# Patient Record
Sex: FEMALE | Race: OTHER | Employment: UNEMPLOYED | URBAN - METROPOLITAN AREA
[De-identification: names, ages, dates, MRNs, and addresses within clinical notes are randomized per-mention and may not be internally consistent; named-entity substitution may affect disease eponyms.]

---

## 2017-03-29 ENCOUNTER — NURSE ONLY (OUTPATIENT)
Dept: FAMILY MEDICINE CLINIC | Facility: CLINIC | Age: 32
End: 2017-03-29

## 2017-03-29 DIAGNOSIS — IMO0001 UNCONTROLLED TYPE 2 DIABETES MELLITUS WITHOUT COMPLICATION, WITHOUT LONG-TERM CURRENT USE OF INSULIN: ICD-10-CM

## 2017-03-29 LAB
ALBUMIN SERPL BCP-MCNC: 3.7 G/DL (ref 3.5–4.8)
ALBUMIN/GLOB SERPL: 1.2 {RATIO} (ref 1–2)
ALP SERPL-CCNC: 76 U/L (ref 32–100)
ALT SERPL-CCNC: 26 U/L (ref 14–54)
ANION GAP SERPL CALC-SCNC: 9 MMOL/L (ref 0–18)
AST SERPL-CCNC: 22 U/L (ref 15–41)
BILIRUB SERPL-MCNC: 0.4 MG/DL (ref 0.3–1.2)
BUN SERPL-MCNC: 12 MG/DL (ref 8–20)
BUN/CREAT SERPL: 16 (ref 10–20)
CALCIUM SERPL-MCNC: 9.2 MG/DL (ref 8.5–10.5)
CHLORIDE SERPL-SCNC: 100 MMOL/L (ref 95–110)
CO2 SERPL-SCNC: 28 MMOL/L (ref 22–32)
CREAT SERPL-MCNC: 0.75 MG/DL (ref 0.5–1.5)
GLOBULIN PLAS-MCNC: 3.1 G/DL (ref 2.5–3.7)
GLUCOSE SERPL-MCNC: 149 MG/DL (ref 70–99)
OSMOLALITY UR CALC.SUM OF ELEC: 287 MOSM/KG (ref 275–295)
POTASSIUM SERPL-SCNC: 5 MMOL/L (ref 3.3–5.1)
PROT SERPL-MCNC: 6.8 G/DL (ref 5.9–8.4)
SODIUM SERPL-SCNC: 137 MMOL/L (ref 136–144)

## 2017-03-29 PROCEDURE — 36415 COLL VENOUS BLD VENIPUNCTURE: CPT

## 2017-03-29 PROCEDURE — 80053 COMPREHEN METABOLIC PANEL: CPT

## 2017-03-29 PROCEDURE — 83036 HEMOGLOBIN GLYCOSYLATED A1C: CPT

## 2017-03-30 LAB — HBA1C MFR BLD: 7.8 % (ref 4–6)

## 2017-04-05 ENCOUNTER — OFFICE VISIT (OUTPATIENT)
Dept: FAMILY MEDICINE CLINIC | Facility: CLINIC | Age: 32
End: 2017-04-05

## 2017-04-05 VITALS
WEIGHT: 153 LBS | OXYGEN SATURATION: 98 % | BODY MASS INDEX: 27 KG/M2 | SYSTOLIC BLOOD PRESSURE: 100 MMHG | HEART RATE: 90 BPM | DIASTOLIC BLOOD PRESSURE: 72 MMHG

## 2017-04-05 DIAGNOSIS — N39.3 FEMALE STRESS INCONTINENCE: ICD-10-CM

## 2017-04-05 DIAGNOSIS — IMO0001 UNCONTROLLED TYPE 2 DIABETES MELLITUS WITHOUT COMPLICATION, WITHOUT LONG-TERM CURRENT USE OF INSULIN: Primary | ICD-10-CM

## 2017-04-05 DIAGNOSIS — E66.3 OVERWEIGHT(278.02): ICD-10-CM

## 2017-04-05 PROCEDURE — 99213 OFFICE O/P EST LOW 20 MIN: CPT

## 2017-04-05 RX ORDER — PHENTERMINE HYDROCHLORIDE 37.5 MG/1
37.5 TABLET ORAL
Qty: 30 TABLET | Refills: 0 | Status: SHIPPED | OUTPATIENT
Start: 2017-04-05 | End: 2017-06-23

## 2017-04-05 RX ORDER — OXYBUTYNIN CHLORIDE 10 MG/1
10 TABLET, EXTENDED RELEASE ORAL DAILY
Qty: 30 TABLET | Refills: 0 | Status: SHIPPED | OUTPATIENT
Start: 2017-04-05 | End: 2018-03-30

## 2017-04-06 NOTE — PATIENT INSTRUCTIONS
Treating Incontinence in Women: Non-surgical Methods    The best treatment for you will depend on the type of incontinence you have. Your symptoms, age, and any underlying problems that are found also affect your treatment.  While some types of incontinen © 4364-7156 [address], [address] [place]. All rights reserved. This information is not intended as a substitute for professional medical care. Always follow your healthcare professional's instructions.

## 2017-04-06 NOTE — PROGRESS NOTES
HPI:    Patient ID: Josafat Ibrahim is a 28year old female. Diabetes  She presents for her follow-up diabetic visit. She has type 2 diabetes mellitus. Onset time: few years. Her disease course has been improving.  Pertinent negatives for hypoglycemia i decreased urine volume. Musculoskeletal: Negative for neck pain. Skin: Negative for rash. Neurological: Negative for dizziness, vertigo, syncope, weakness, light-headedness, numbness and headaches. All other systems reviewed and are negative. daily.    3. Overweight  4. BMI 27.0-27.9,adult  Pt counseled with regards to diet/exercise. Refill for Phentermine given. RTC in 1 month for f/u of weight management and in 3 months for f/u of DM.         Orders Placed This Encounter  Comp Metabolic Pa

## 2017-06-21 ENCOUNTER — NURSE ONLY (OUTPATIENT)
Dept: FAMILY MEDICINE CLINIC | Facility: CLINIC | Age: 32
End: 2017-06-21

## 2017-06-21 DIAGNOSIS — IMO0001 UNCONTROLLED TYPE 2 DIABETES MELLITUS WITHOUT COMPLICATION, WITHOUT LONG-TERM CURRENT USE OF INSULIN: ICD-10-CM

## 2017-06-21 PROCEDURE — 36415 COLL VENOUS BLD VENIPUNCTURE: CPT

## 2017-06-21 PROCEDURE — 85018 HEMOGLOBIN: CPT

## 2017-06-21 PROCEDURE — 83036 HEMOGLOBIN GLYCOSYLATED A1C: CPT

## 2017-06-21 PROCEDURE — 80053 COMPREHEN METABOLIC PANEL: CPT

## 2017-06-21 PROCEDURE — 82962 GLUCOSE BLOOD TEST: CPT

## 2017-06-23 ENCOUNTER — OFFICE VISIT (OUTPATIENT)
Dept: FAMILY MEDICINE CLINIC | Facility: CLINIC | Age: 32
End: 2017-06-23

## 2017-06-23 VITALS
BODY MASS INDEX: 27.33 KG/M2 | HEART RATE: 88 BPM | HEIGHT: 62 IN | SYSTOLIC BLOOD PRESSURE: 104 MMHG | DIASTOLIC BLOOD PRESSURE: 70 MMHG | OXYGEN SATURATION: 98 % | WEIGHT: 148.5 LBS

## 2017-06-23 DIAGNOSIS — Z13.31 DEPRESSION SCREENING: ICD-10-CM

## 2017-06-23 DIAGNOSIS — E11.9 CONTROLLED TYPE 2 DIABETES MELLITUS WITHOUT COMPLICATION, WITHOUT LONG-TERM CURRENT USE OF INSULIN (HCC): Primary | ICD-10-CM

## 2017-06-23 DIAGNOSIS — E66.3 OVERWEIGHT: ICD-10-CM

## 2017-06-23 PROCEDURE — 99213 OFFICE O/P EST LOW 20 MIN: CPT

## 2017-06-23 RX ORDER — PHENTERMINE HYDROCHLORIDE 37.5 MG/1
37.5 TABLET ORAL
Qty: 30 TABLET | Refills: 0 | Status: SHIPPED | OUTPATIENT
Start: 2017-06-23 | End: 2017-10-04

## 2017-06-23 NOTE — PATIENT INSTRUCTIONS
Diabetes: Inspecting Your Feet    Diabetes increases your chances of developing foot problems. So inspect your feet every day. This helps you find small skin irritations before they become serious ulcers or infections.  If you have trouble seeing the jarred · Cracks and sores are caused by dry or irritated skin. They are a sign that the skin is breaking down, which can lead to infection. · Toenail problems to watch for include nails growing into the skin (ingrown toenail) and causing redness or pain.  Thick,

## 2017-06-23 NOTE — PROGRESS NOTES
HPI:    Patient ID: Patricia Cueva is a 28year old female. Diabetes  She presents for her follow-up diabetic visit. She has type 2 diabetes mellitus. Onset time: few years. Her disease course has been improving.  Pertinent negatives for hypoglycemia i Endocrine: Negative for polydipsia, polyphagia and polyuria. Genitourinary: Negative for dysuria, hematuria and decreased urine volume. Musculoskeletal: Negative for myalgias and neck pain. Skin: Negative for rash.    Neurological: Negative for dizz insulin ([address]  Pt reassured and all questions answered. Lab results reviewed and discussed with pt. Latest Hgb A1c controlled @ 6.9. Refill for Metformin given.   Will refer to Dr. Herbert Smith (Ophthalmology) for diabetic eye exam.  Diabetic foot exam perfo

## 2017-09-28 ENCOUNTER — TELEPHONE (OUTPATIENT)
Dept: FAMILY MEDICINE CLINIC | Facility: CLINIC | Age: 32
End: 2017-09-28

## 2017-09-28 ENCOUNTER — APPOINTMENT (OUTPATIENT)
Dept: LAB | Facility: HOSPITAL | Age: 32
End: 2017-09-28
Payer: MEDICAID

## 2017-09-28 DIAGNOSIS — E11.9 TYPE 2 DIABETES MELLITUS WITHOUT COMPLICATION, WITHOUT LONG-TERM CURRENT USE OF INSULIN (HCC): Primary | ICD-10-CM

## 2017-09-28 DIAGNOSIS — E11.9 TYPE 2 DIABETES MELLITUS WITHOUT COMPLICATION, WITHOUT LONG-TERM CURRENT USE OF INSULIN (HCC): ICD-10-CM

## 2017-09-28 PROCEDURE — 80053 COMPREHEN METABOLIC PANEL: CPT

## 2017-09-28 PROCEDURE — 36415 COLL VENOUS BLD VENIPUNCTURE: CPT

## 2017-09-28 PROCEDURE — 83036 HEMOGLOBIN GLYCOSYLATED A1C: CPT

## 2017-09-28 NOTE — TELEPHONE ENCOUNTER
Patient is looking for orders to get 3 months f/u on her diabetes. Ok per Dr Leonarda Prater to place orders in the system.

## 2017-10-04 ENCOUNTER — OFFICE VISIT (OUTPATIENT)
Dept: FAMILY MEDICINE CLINIC | Facility: CLINIC | Age: 32
End: 2017-10-04

## 2017-10-04 VITALS
HEART RATE: 62 BPM | DIASTOLIC BLOOD PRESSURE: 76 MMHG | OXYGEN SATURATION: 98 % | BODY MASS INDEX: 27 KG/M2 | SYSTOLIC BLOOD PRESSURE: 98 MMHG | WEIGHT: 150 LBS

## 2017-10-04 DIAGNOSIS — Z28.21 IMMUNIZATION NOT CARRIED OUT BECAUSE OF PATIENT REFUSAL: ICD-10-CM

## 2017-10-04 DIAGNOSIS — E66.3 OVERWEIGHT: ICD-10-CM

## 2017-10-04 DIAGNOSIS — E11.9 CONTROLLED TYPE 2 DIABETES MELLITUS WITHOUT COMPLICATION, WITHOUT LONG-TERM CURRENT USE OF INSULIN (HCC): Primary | ICD-10-CM

## 2017-10-04 PROCEDURE — 99213 OFFICE O/P EST LOW 20 MIN: CPT

## 2017-10-04 RX ORDER — PHENTERMINE HYDROCHLORIDE 37.5 MG/1
37.5 TABLET ORAL
Qty: 30 TABLET | Refills: 0 | Status: SHIPPED | OUTPATIENT
Start: 2017-10-04 | End: 2017-11-01

## 2017-10-05 PROBLEM — Z28.21 IMMUNIZATION NOT CARRIED OUT BECAUSE OF PATIENT REFUSAL: Status: ACTIVE | Noted: 2017-10-05

## 2017-10-05 NOTE — PROGRESS NOTES
HPI:    Patient ID: Joshua Allen is a 28year old female. Diabetes   She presents for her follow-up diabetic visit. She has type 2 diabetes mellitus. Onset time: few years. Her disease course has been improving.  Pertinent negatives for hypoglycemia abdominal pain, anorexia, change in bowel habit, constipation, diarrhea, nausea and vomiting. Endocrine: Negative for polydipsia, polyphagia and polyuria. Genitourinary: Negative for decreased urine volume, dysuria and hematuria.    Musculoskeletal: Neg Dr. Jwuan Kwong (Ophthalmology) for diabetic eye exam.  Will obtain CMP and Hgb A1c prior to next scheduled appointment. 2. Overweight  3. BMI 27.0-27.9,adult  Pt counseled with regards to diet/exercise. Refill for Phentermine given.     4. Immunization no

## 2017-11-01 ENCOUNTER — OFFICE VISIT (OUTPATIENT)
Dept: FAMILY MEDICINE CLINIC | Facility: CLINIC | Age: 32
End: 2017-11-01

## 2017-11-01 VITALS
DIASTOLIC BLOOD PRESSURE: 76 MMHG | HEART RATE: 61 BPM | BODY MASS INDEX: 27 KG/M2 | SYSTOLIC BLOOD PRESSURE: 100 MMHG | WEIGHT: 149 LBS | OXYGEN SATURATION: 99 %

## 2017-11-01 DIAGNOSIS — E11.9 CONTROLLED TYPE 2 DIABETES MELLITUS WITHOUT COMPLICATION, WITHOUT LONG-TERM CURRENT USE OF INSULIN (HCC): ICD-10-CM

## 2017-11-01 DIAGNOSIS — Z28.21 IMMUNIZATION NOT CARRIED OUT BECAUSE OF PATIENT REFUSAL: ICD-10-CM

## 2017-11-01 DIAGNOSIS — Z30.09 ENCOUNTER FOR GENERAL COUNSELING AND ADVICE ON CONTRACEPTIVE MANAGEMENT: Primary | ICD-10-CM

## 2017-11-01 DIAGNOSIS — Z30.011 ENCOUNTER FOR INITIAL PRESCRIPTION OF CONTRACEPTIVE PILLS: ICD-10-CM

## 2017-11-01 DIAGNOSIS — E66.3 OVERWEIGHT: ICD-10-CM

## 2017-11-01 DIAGNOSIS — Z01.89 ENCOUNTER FOR ROUTINE LABORATORY TESTING: ICD-10-CM

## 2017-11-01 PROCEDURE — 99214 OFFICE O/P EST MOD 30 MIN: CPT

## 2017-11-01 RX ORDER — NORETHINDRONE ACETATE AND ETHINYL ESTRADIOL 1.5-30(21)
1 KIT ORAL DAILY
Qty: 1 PACKAGE | Refills: 2 | Status: SHIPPED | OUTPATIENT
Start: 2017-11-01 | End: 2018-01-31

## 2017-11-01 RX ORDER — PHENTERMINE HYDROCHLORIDE 37.5 MG/1
37.5 TABLET ORAL
Qty: 30 TABLET | Refills: 0 | Status: SHIPPED | OUTPATIENT
Start: 2017-11-01 | End: 2018-01-31

## 2017-11-01 RX ORDER — NAPROXEN 500 MG/1
500 TABLET ORAL 2 TIMES DAILY WITH MEALS
Qty: 60 TABLET | Refills: 1 | Status: SHIPPED | OUTPATIENT
Start: 2017-11-01 | End: 2018-03-30 | Stop reason: ALTCHOICE

## 2017-11-02 NOTE — PATIENT INSTRUCTIONS
Birth Control Choices  Birth control keeps you from getting pregnant during sex. There are many types of birth control. Some are more effective than others. New types are being tested all the time.  Your healthcare provider can help you decide which type · Female sterilization is surgery to block or cut the woman's fallopian tubes. It can be done by placing an instrument into the uterus (hysteroscopy) to insert small coils into the fallopian tubes (Essure).  It can also be done through the belly (laparoscop

## 2017-11-02 NOTE — PROGRESS NOTES
HPI:    Patient ID: Chantel Solomon is a 28year old female. Obesity   This is a chronic problem. Episode onset: few years ago. The problem occurs constantly. The problem has been waxing and waning.  Pertinent negatives include no abdominal pain, anorex volume, dysuria and hematuria. Musculoskeletal: Negative for arthralgias, joint swelling, myalgias and neck pain. Skin: Negative for rash. Neurological: Negative for dizziness, vertigo, syncope, weakness, light-headedness, numbness and headaches.    A pt, and following this discussion pt decided to try OCPs. Rx for Loestrin Fe given. 3. Overweight  4. BMI 27.0-27.9,adult  Pt counseled with regards to diet/exercise. New Rx for Phentermine given.     5. Controlled type 2 diabetes mellitus without comp

## 2017-12-14 ENCOUNTER — TELEPHONE (OUTPATIENT)
Dept: FAMILY MEDICINE CLINIC | Facility: CLINIC | Age: 32
End: 2017-12-14

## 2017-12-14 NOTE — TELEPHONE ENCOUNTER
Patient is calling to get a refill on her birth control. She has an appointment for her physical on Jan 24th.

## 2017-12-14 NOTE — TELEPHONE ENCOUNTER
Patient still has available refills on her birth control - verified with pharmacy as well. Called patient and notified her of this. Verbalized understanding.

## 2018-01-31 ENCOUNTER — OFFICE VISIT (OUTPATIENT)
Dept: FAMILY MEDICINE CLINIC | Facility: CLINIC | Age: 33
End: 2018-01-31

## 2018-01-31 VITALS
DIASTOLIC BLOOD PRESSURE: 70 MMHG | HEART RATE: 86 BPM | BODY MASS INDEX: 27 KG/M2 | SYSTOLIC BLOOD PRESSURE: 90 MMHG | OXYGEN SATURATION: 98 % | WEIGHT: 150 LBS

## 2018-01-31 DIAGNOSIS — Z30.011 ENCOUNTER FOR INITIAL PRESCRIPTION OF CONTRACEPTIVE PILLS: ICD-10-CM

## 2018-01-31 DIAGNOSIS — E66.3 OVERWEIGHT: Primary | ICD-10-CM

## 2018-01-31 PROCEDURE — 99213 OFFICE O/P EST LOW 20 MIN: CPT

## 2018-01-31 RX ORDER — PHENTERMINE HYDROCHLORIDE 37.5 MG/1
37.5 TABLET ORAL
Qty: 30 TABLET | Refills: 0 | Status: SHIPPED | OUTPATIENT
Start: 2018-01-31 | End: 2018-03-30

## 2018-01-31 RX ORDER — NORETHINDRONE ACETATE AND ETHINYL ESTRADIOL 1.5-30(21)
1 KIT ORAL DAILY
Qty: 1 PACKAGE | Refills: 0 | Status: SHIPPED | OUTPATIENT
Start: 2018-01-31 | End: 2018-03-30

## 2018-02-01 NOTE — PATIENT INSTRUCTIONS
Birth Control: The Pill    Birth control pills contain hormones that help prevent pregnancy. The pills are prescribed by your healthcare provider. There are many types of birth control pills available.  If you have side effects from one type of pill, tell In these cases, discuss the risks with your healthcare provider. Date Last Reviewed: 3/1/2017  © 7659-2664 [employer] [address]. [address] [facility], [address] [place] [address]. All rights reserved.  This information is not intended as a substitute for prof

## 2018-02-01 NOTE — PROGRESS NOTES
HPI:    Patient ID: Charmaine Bruner is a 28year old female. Obesity   This is a chronic problem. Episode onset: few years ago. The problem occurs daily. The problem has been waxing and waning.  Pertinent negatives include no abdominal pain, anorexia, a 2 (two) times daily with meals. Disp: 60 tablet Rfl: 1   MetFORMIN HCl 500 MG Oral Tab Take 1 tablet (500 mg total) by mouth 2 (two) times daily with meals.  Disp: 60 tablet Rfl: 2   Oxybutynin Chloride ER 10 MG Oral Tablet 24 Hr Take 1 tablet (10 mg total)

## 2018-02-16 ENCOUNTER — TELEPHONE (OUTPATIENT)
Dept: FAMILY MEDICINE CLINIC | Facility: CLINIC | Age: 33
End: 2018-02-16

## 2018-02-16 RX ORDER — NORETHINDRONE ACETATE AND ETHINYL ESTRADIOL 1; .02 MG/1; MG/1
1 TABLET ORAL DAILY
Qty: 28 TABLET | Refills: 0 | Status: SHIPPED | OUTPATIENT
Start: 2018-02-16 | End: 2018-03-30

## 2018-02-16 NOTE — TELEPHONE ENCOUNTER
Pt called states Dr Elaina Sanchez prescribed new birth control and states that her period has lasted for about 9 days now.  Pt would like to speak to the nurse

## 2018-03-07 ENCOUNTER — TELEPHONE (OUTPATIENT)
Dept: FAMILY MEDICINE CLINIC | Facility: CLINIC | Age: 33
End: 2018-03-07

## 2018-03-07 NOTE — TELEPHONE ENCOUNTER
Called and spoke to pt to remind pt she is due for diabetic eye exam. Pt stated she would call the eye specialist to make an appt.

## 2018-03-11 ENCOUNTER — APPOINTMENT (OUTPATIENT)
Dept: GENERAL RADIOLOGY | Facility: HOSPITAL | Age: 33
End: 2018-03-11
Attending: EMERGENCY MEDICINE
Payer: MEDICAID

## 2018-03-11 ENCOUNTER — HOSPITAL ENCOUNTER (EMERGENCY)
Facility: HOSPITAL | Age: 33
Discharge: HOME | End: 2018-03-11
Attending: EMERGENCY MEDICINE
Payer: MEDICAID

## 2018-03-11 VITALS
RESPIRATION RATE: 18 BRPM | WEIGHT: 150 LBS | DIASTOLIC BLOOD PRESSURE: 72 MMHG | HEART RATE: 75 BPM | OXYGEN SATURATION: 100 % | BODY MASS INDEX: 26.58 KG/M2 | SYSTOLIC BLOOD PRESSURE: 122 MMHG | TEMPERATURE: 98 F | HEIGHT: 63 IN

## 2018-03-11 DIAGNOSIS — R10.9 ACUTE LEFT FLANK PAIN: Primary | ICD-10-CM

## 2018-03-11 LAB
ANION GAP SERPL CALC-SCNC: 9 MMOL/L (ref 0–18)
B-HCG UR QL: NEGATIVE
BASOPHILS # BLD: 0 K/UL (ref 0–0.2)
BASOPHILS NFR BLD: 0 %
BILIRUB UR QL: NEGATIVE
BUN SERPL-MCNC: 11 MG/DL (ref 8–20)
BUN/CREAT SERPL: 19.3 (ref 10–20)
CALCIUM SERPL-MCNC: 9.3 MG/DL (ref 8.5–10.5)
CHLORIDE SERPL-SCNC: 98 MMOL/L (ref 95–110)
CLARITY UR: CLEAR
CO2 SERPL-SCNC: 28 MMOL/L (ref 22–32)
COLOR UR: YELLOW
CREAT SERPL-MCNC: 0.57 MG/DL (ref 0.5–1.5)
D DIMER PPP FEU-MCNC: <0.27 MCG/ML (ref ?–0.5)
EOSINOPHIL # BLD: 0.1 K/UL (ref 0–0.7)
EOSINOPHIL NFR BLD: 1 %
ERYTHROCYTE [DISTWIDTH] IN BLOOD BY AUTOMATED COUNT: 14.2 % (ref 11–15)
GLUCOSE SERPL-MCNC: 111 MG/DL (ref 70–99)
GLUCOSE UR-MCNC: NEGATIVE MG/DL
HCT VFR BLD AUTO: 39.4 % (ref 35–48)
HGB BLD-MCNC: 13.1 G/DL (ref 12–16)
HGB UR QL STRIP.AUTO: NEGATIVE
KETONES UR-MCNC: NEGATIVE MG/DL
LEUKOCYTE ESTERASE UR QL STRIP.AUTO: NEGATIVE
LYMPHOCYTES # BLD: 3.4 K/UL (ref 1–4)
LYMPHOCYTES NFR BLD: 27 %
MCH RBC QN AUTO: 27 PG (ref 27–32)
MCHC RBC AUTO-ENTMCNC: 33.1 G/DL (ref 32–37)
MCV RBC AUTO: 81.5 FL (ref 80–100)
MONOCYTES # BLD: 0.9 K/UL (ref 0–1)
MONOCYTES NFR BLD: 7 %
NEUTROPHILS # BLD AUTO: 8 K/UL (ref 1.8–7.7)
NEUTROPHILS NFR BLD: 65 %
NITRITE UR QL STRIP.AUTO: NEGATIVE
OSMOLALITY UR CALC.SUM OF ELEC: 280 MOSM/KG (ref 275–295)
PH UR: 7 [PH] (ref 5–8)
PLATELET # BLD AUTO: 308 K/UL (ref 140–400)
PMV BLD AUTO: 8.7 FL (ref 7.4–10.3)
POTASSIUM SERPL-SCNC: 3.5 MMOL/L (ref 3.3–5.1)
PROT UR-MCNC: NEGATIVE MG/DL
RBC # BLD AUTO: 4.84 M/UL (ref 3.7–5.4)
SODIUM SERPL-SCNC: 135 MMOL/L (ref 136–144)
SP GR UR STRIP: 1.01 (ref 1–1.03)
UROBILINOGEN UR STRIP-ACNC: <2
VIT C UR-MCNC: NEGATIVE MG/DL
WBC # BLD AUTO: 12.4 K/UL (ref 4–11)

## 2018-03-11 PROCEDURE — 85379 FIBRIN DEGRADATION QUANT: CPT | Performed by: EMERGENCY MEDICINE

## 2018-03-11 PROCEDURE — 71046 X-RAY EXAM CHEST 2 VIEWS: CPT | Performed by: EMERGENCY MEDICINE

## 2018-03-11 PROCEDURE — 85025 COMPLETE CBC W/AUTO DIFF WBC: CPT | Performed by: EMERGENCY MEDICINE

## 2018-03-11 PROCEDURE — 96374 THER/PROPH/DIAG INJ IV PUSH: CPT

## 2018-03-11 PROCEDURE — 81003 URINALYSIS AUTO W/O SCOPE: CPT | Performed by: EMERGENCY MEDICINE

## 2018-03-11 PROCEDURE — 99284 EMERGENCY DEPT VISIT MOD MDM: CPT

## 2018-03-11 PROCEDURE — 80048 BASIC METABOLIC PNL TOTAL CA: CPT | Performed by: EMERGENCY MEDICINE

## 2018-03-11 PROCEDURE — 81025 URINE PREGNANCY TEST: CPT

## 2018-03-11 RX ORDER — CYCLOBENZAPRINE HCL 5 MG
5 TABLET ORAL 3 TIMES DAILY PRN
Qty: 15 TABLET | Refills: 0 | Status: SHIPPED | OUTPATIENT
Start: 2018-03-11 | End: 2018-03-30

## 2018-03-11 RX ORDER — KETOROLAC TROMETHAMINE 30 MG/ML
30 INJECTION, SOLUTION INTRAMUSCULAR; INTRAVENOUS ONCE
Status: COMPLETED | OUTPATIENT
Start: 2018-03-11 | End: 2018-03-11

## 2018-03-11 NOTE — ED PROVIDER NOTES
Patient Seen in: [facility] AND [facility] Emergency Department    History   Patient presents with:  Abdomen/Flank Pain (GI/)    Stated Complaint: left flank pain    HPI    Patient presents to the emergency department with complaint of pain to the left flank a Systems  Constitutional: no fever,  normal appetite, normal energy  HEENT: No cough, no congestion  Cardiovascular: no chest pain pain is on the left flank going into the lower part of the chest  Respiratory: no shortness of breath  Gastrointestinal: no ab comfortable however when she moves she was still having the pain we discussed pain medication as well as muscle relaxer she has an appointment coming up with her doctor in 3 days I discussed muscle relaxer side effects it [birth year] she is going to go Medication List    START taking these medications    Cyclobenzaprine HCl 5 MG Oral Tab  Take 1 tablet (5 mg total) by mouth 3 (three) times daily as needed for Muscle spasms.   Qty: 15 tablet Refills: 0

## 2018-03-14 ENCOUNTER — APPOINTMENT (OUTPATIENT)
Dept: LAB | Facility: HOSPITAL | Age: 33
End: 2018-03-14
Payer: MEDICAID

## 2018-03-14 DIAGNOSIS — E11.9 CONTROLLED TYPE 2 DIABETES MELLITUS WITHOUT COMPLICATION, WITHOUT LONG-TERM CURRENT USE OF INSULIN (HCC): ICD-10-CM

## 2018-03-14 DIAGNOSIS — Z01.89 ENCOUNTER FOR ROUTINE LABORATORY TESTING: ICD-10-CM

## 2018-03-14 LAB
ALBUMIN SERPL BCP-MCNC: 3.5 G/DL (ref 3.5–4.8)
ALBUMIN/GLOB SERPL: 1.1 {RATIO} (ref 1–2)
ALP SERPL-CCNC: 49 U/L (ref 32–100)
ALT SERPL-CCNC: 20 U/L (ref 14–54)
ANION GAP SERPL CALC-SCNC: 6 MMOL/L (ref 0–18)
AST SERPL-CCNC: 16 U/L (ref 15–41)
BACTERIA UR QL AUTO: NEGATIVE /HPF
BILIRUB SERPL-MCNC: 0.5 MG/DL (ref 0.3–1.2)
BILIRUB UR QL: NEGATIVE
BUN SERPL-MCNC: 8 MG/DL (ref 8–20)
BUN/CREAT SERPL: 14 (ref 10–20)
CALCIUM SERPL-MCNC: 8.7 MG/DL (ref 8.5–10.5)
CHLORIDE SERPL-SCNC: 103 MMOL/L (ref 95–110)
CHOLEST SERPL-MCNC: 195 MG/DL (ref 110–200)
CO2 SERPL-SCNC: 26 MMOL/L (ref 22–32)
COLOR UR: YELLOW
CREAT SERPL-MCNC: 0.57 MG/DL (ref 0.5–1.5)
CREAT UR-MCNC: 232.6 MG/DL
ERYTHROCYTE [DISTWIDTH] IN BLOOD BY AUTOMATED COUNT: 14.4 % (ref 11–15)
GLOBULIN PLAS-MCNC: 3.1 G/DL (ref 2.5–3.7)
GLUCOSE SERPL-MCNC: 180 MG/DL (ref 70–99)
GLUCOSE UR-MCNC: NEGATIVE MG/DL
HBA1C MFR BLD: 7 % (ref 4–6)
HCT VFR BLD AUTO: 40.4 % (ref 35–48)
HDLC SERPL-MCNC: 39 MG/DL
HGB BLD-MCNC: 13.6 G/DL (ref 12–16)
KETONES UR-MCNC: NEGATIVE MG/DL
LDLC SERPL CALC-MCNC: 129 MG/DL (ref 0–99)
MCH RBC QN AUTO: 27.6 PG (ref 27–32)
MCHC RBC AUTO-ENTMCNC: 33.6 G/DL (ref 32–37)
MCV RBC AUTO: 82 FL (ref 80–100)
MICROALBUMIN UR-MCNC: 0.9 MG/DL (ref 0–1.8)
MICROALBUMIN/CREAT UR: 3.9 MG/G{CREAT} (ref 0–20)
NITRITE UR QL STRIP.AUTO: NEGATIVE
NONHDLC SERPL-MCNC: 156 MG/DL
OSMOLALITY UR CALC.SUM OF ELEC: 283 MOSM/KG (ref 275–295)
PATIENT FASTING: YES
PH UR: 5 [PH] (ref 5–8)
PLATELET # BLD AUTO: 289 K/UL (ref 140–400)
PMV BLD AUTO: 8.2 FL (ref 7.4–10.3)
POTASSIUM SERPL-SCNC: 4 MMOL/L (ref 3.3–5.1)
PROT SERPL-MCNC: 6.6 G/DL (ref 5.9–8.4)
PROT UR-MCNC: NEGATIVE MG/DL
RBC # BLD AUTO: 4.92 M/UL (ref 3.7–5.4)
RBC #/AREA URNS AUTO: 2 /HPF
SODIUM SERPL-SCNC: 135 MMOL/L (ref 136–144)
SP GR UR STRIP: 1.03 (ref 1–1.03)
TRIGL SERPL-MCNC: 135 MG/DL (ref 1–149)
UROBILINOGEN UR STRIP-ACNC: <2
VIT C UR-MCNC: NEGATIVE MG/DL
WBC # BLD AUTO: 9 K/UL (ref 4–11)
WBC #/AREA URNS AUTO: 5 /HPF

## 2018-03-14 PROCEDURE — 82043 UR ALBUMIN QUANTITATIVE: CPT

## 2018-03-14 PROCEDURE — 82570 ASSAY OF URINE CREATININE: CPT

## 2018-03-14 PROCEDURE — 80053 COMPREHEN METABOLIC PANEL: CPT

## 2018-03-14 PROCEDURE — 36415 COLL VENOUS BLD VENIPUNCTURE: CPT

## 2018-03-14 PROCEDURE — 83036 HEMOGLOBIN GLYCOSYLATED A1C: CPT

## 2018-03-14 PROCEDURE — 80061 LIPID PANEL: CPT

## 2018-03-14 PROCEDURE — 81001 URINALYSIS AUTO W/SCOPE: CPT

## 2018-03-14 PROCEDURE — 85027 COMPLETE CBC AUTOMATED: CPT

## 2018-03-22 RX ORDER — ESTAZOLAM 2 MG/1
TABLET ORAL
Qty: 21 TABLET | Refills: 0 | OUTPATIENT
Start: 2018-03-22

## 2018-03-30 ENCOUNTER — OFFICE VISIT (OUTPATIENT)
Dept: FAMILY MEDICINE CLINIC | Facility: CLINIC | Age: 33
End: 2018-03-30

## 2018-03-30 ENCOUNTER — TELEPHONE (OUTPATIENT)
Dept: FAMILY MEDICINE CLINIC | Facility: CLINIC | Age: 33
End: 2018-03-30

## 2018-03-30 VITALS
DIASTOLIC BLOOD PRESSURE: 70 MMHG | HEIGHT: 62 IN | RESPIRATION RATE: 17 BRPM | SYSTOLIC BLOOD PRESSURE: 90 MMHG | BODY MASS INDEX: 28.89 KG/M2 | WEIGHT: 157 LBS | HEART RATE: 62 BPM | OXYGEN SATURATION: 98 %

## 2018-03-30 DIAGNOSIS — Z00.01 ENCOUNTER FOR ROUTINE ADULT HEALTH EXAMINATION WITH ABNORMAL FINDINGS: Primary | ICD-10-CM

## 2018-03-30 DIAGNOSIS — N39.3 FEMALE STRESS INCONTINENCE: ICD-10-CM

## 2018-03-30 DIAGNOSIS — Z30.41 ENCOUNTER FOR SURVEILLANCE OF CONTRACEPTIVE PILLS: ICD-10-CM

## 2018-03-30 DIAGNOSIS — E66.3 OVERWEIGHT (BMI 25.0-29.9): ICD-10-CM

## 2018-03-30 DIAGNOSIS — E78.2 MIXED HYPERLIPIDEMIA: ICD-10-CM

## 2018-03-30 DIAGNOSIS — F41.9 ANXIETY: ICD-10-CM

## 2018-03-30 DIAGNOSIS — E11.9 CONTROLLED TYPE 2 DIABETES MELLITUS WITHOUT COMPLICATION, WITHOUT LONG-TERM CURRENT USE OF INSULIN (HCC): ICD-10-CM

## 2018-03-30 DIAGNOSIS — Z13.31 DEPRESSION SCREENING: ICD-10-CM

## 2018-03-30 DIAGNOSIS — Z28.21 IMMUNIZATION NOT CARRIED OUT BECAUSE OF PATIENT REFUSAL: ICD-10-CM

## 2018-03-30 PROCEDURE — 90732 PPSV23 VACC 2 YRS+ SUBQ/IM: CPT

## 2018-03-30 PROCEDURE — 90471 IMMUNIZATION ADMIN: CPT

## 2018-03-30 PROCEDURE — 99395 PREV VISIT EST AGE 18-39: CPT

## 2018-03-30 RX ORDER — NORETHINDRONE ACETATE AND ETHINYL ESTRADIOL 1.5-30(21)
1 KIT ORAL DAILY
Qty: 1 PACKAGE | Refills: 11 | Status: SHIPPED | OUTPATIENT
Start: 2018-03-30 | End: 2018-10-31

## 2018-03-30 RX ORDER — PHENTERMINE HYDROCHLORIDE 37.5 MG/1
37.5 TABLET ORAL
Qty: 30 TABLET | Refills: 0 | Status: SHIPPED | OUTPATIENT
Start: 2018-03-30 | End: 2018-07-17

## 2018-03-31 PROBLEM — Z30.41 ENCOUNTER FOR SURVEILLANCE OF CONTRACEPTIVE PILLS: Status: ACTIVE | Noted: 2017-11-01

## 2018-03-31 PROBLEM — H43.393 VITREOUS FLOATERS OF BOTH EYES: Status: ACTIVE | Noted: 2018-03-26

## 2018-03-31 NOTE — PROGRESS NOTES
CC: Annual Physical Exam    HPI:   Mariely Moseley is a 35year old female who presents for a complete physical exam. Symptoms: denies discharge, itching, burning or dysuria, periods are regular, and has incontinence.  Patient denies any acute complaints a Color Yellow Yellow   Clarity Urine Hazy (A) Clear   Spec Gravity 1.026 1.002 - 1.035   pH Urine 5.0 5.0 - 8.0   Protein Urine Negative Negative mg/dL   Glucose Urine Negative Negative mg/dL   Ketones Urine Negative Negative mg/dL   Bilirubin Urine Negativ Smokeless tobacco: Never Used                      Alcohol use: Yes             Occ: employed. : yes. Children: 3 sons.    Exercise: minimal.  Diet: watches minimally     REVIEW OF SYSTEMS:   CONSTITUTIONAL:  Denies unusual weight gain/loss, feve nourished, no apparent distress. HEENT:  Head:  Normocephalic, atraumatic Eyes: EOMI, PERRLA, no scleral icterus, conjunctivae clear bilaterally, no eye discharge Ears: TM's clear and intact bilaterally, no excess cerumen or erythema.  Nose: patent, no glo Mixed hyperlipidemia  - Omega 3 fish oil pills 2-4g PO daily recommended. 4. Female stress incontinence  - Oxybutynin d/c'ed as pt unable to tolerate side effects of medication.  - Will proceed with watchful monitoring for now.     5. Anxiety  - Stable;

## 2018-04-04 ENCOUNTER — MED REC SCAN ONLY (OUTPATIENT)
Dept: FAMILY MEDICINE CLINIC | Facility: CLINIC | Age: 33
End: 2018-04-04

## 2018-04-12 ENCOUNTER — TELEPHONE (OUTPATIENT)
Dept: FAMILY MEDICINE CLINIC | Facility: CLINIC | Age: 33
End: 2018-04-12

## 2018-04-12 RX ORDER — METHYLPREDNISOLONE 4 MG/1
TABLET ORAL
Qty: 1 KIT | Refills: 0 | Status: SHIPPED | OUTPATIENT
Start: 2018-04-12 | End: 2018-07-17 | Stop reason: ALTCHOICE

## 2018-04-12 RX ORDER — IBUPROFEN 800 MG/1
800 TABLET ORAL EVERY 8 HOURS PRN
Qty: 60 TABLET | Refills: 0 | Status: SHIPPED | OUTPATIENT
Start: 2018-04-12 | End: 2018-07-17 | Stop reason: ALTCHOICE

## 2018-04-12 NOTE — TELEPHONE ENCOUNTER
Left ramone DE SANTIAGO notified and he authorized Ibuprofen 800mg and Medrol pack - e-rx to pharmacy on file.

## 2018-04-12 NOTE — TELEPHONE ENCOUNTER
Pt called stating the pain hasn't gone away. Pt was in the ER for left flank pain and was given Cyclobenzaprine 5mg. Pt took it for a couple days then stopped, then felt the pain again and took two Cyclobenzaprine.  Pt feels it did not help for the pain and

## 2018-07-05 DIAGNOSIS — Z30.011 ENCOUNTER FOR INITIAL PRESCRIPTION OF CONTRACEPTIVE PILLS: ICD-10-CM

## 2018-07-11 ENCOUNTER — APPOINTMENT (OUTPATIENT)
Dept: LAB | Facility: HOSPITAL | Age: 33
End: 2018-07-11
Payer: MEDICAID

## 2018-07-11 DIAGNOSIS — E11.9 CONTROLLED TYPE 2 DIABETES MELLITUS WITHOUT COMPLICATION, WITHOUT LONG-TERM CURRENT USE OF INSULIN (HCC): ICD-10-CM

## 2018-07-11 LAB
ALBUMIN SERPL BCP-MCNC: 3.6 G/DL (ref 3.5–4.8)
ALBUMIN/GLOB SERPL: 1.1 {RATIO} (ref 1–2)
ALP SERPL-CCNC: 51 U/L (ref 32–100)
ALT SERPL-CCNC: 34 U/L (ref 14–54)
ANION GAP SERPL CALC-SCNC: 8 MMOL/L (ref 0–18)
AST SERPL-CCNC: 36 U/L (ref 15–41)
BILIRUB SERPL-MCNC: 0.8 MG/DL (ref 0.3–1.2)
BUN SERPL-MCNC: 11 MG/DL (ref 8–20)
BUN/CREAT SERPL: 15.1 (ref 10–20)
CALCIUM SERPL-MCNC: 8.7 MG/DL (ref 8.5–10.5)
CHLORIDE SERPL-SCNC: 101 MMOL/L (ref 95–110)
CO2 SERPL-SCNC: 25 MMOL/L (ref 22–32)
CREAT SERPL-MCNC: 0.73 MG/DL (ref 0.5–1.5)
GLOBULIN PLAS-MCNC: 3.3 G/DL (ref 2.5–3.7)
GLUCOSE SERPL-MCNC: 160 MG/DL (ref 70–99)
HBA1C MFR BLD: 7.3 % (ref 4–6)
OSMOLALITY UR CALC.SUM OF ELEC: 281 MOSM/KG (ref 275–295)
PATIENT FASTING: YES
POTASSIUM SERPL-SCNC: 4.2 MMOL/L (ref 3.3–5.1)
PROT SERPL-MCNC: 6.9 G/DL (ref 5.9–8.4)
SODIUM SERPL-SCNC: 134 MMOL/L (ref 136–144)

## 2018-07-11 PROCEDURE — 80053 COMPREHEN METABOLIC PANEL: CPT

## 2018-07-11 PROCEDURE — 83036 HEMOGLOBIN GLYCOSYLATED A1C: CPT

## 2018-07-11 PROCEDURE — 36415 COLL VENOUS BLD VENIPUNCTURE: CPT

## 2018-07-11 RX ORDER — NORETHINDRONE ACETATE AND ETHINYL ESTRADIOL 1.5-30(21)
KIT ORAL
Qty: 28 TABLET | Refills: 0 | OUTPATIENT
Start: 2018-07-11

## 2018-07-17 ENCOUNTER — OFFICE VISIT (OUTPATIENT)
Dept: FAMILY MEDICINE CLINIC | Facility: CLINIC | Age: 33
End: 2018-07-17
Payer: MEDICAID

## 2018-07-17 VITALS
OXYGEN SATURATION: 98 % | SYSTOLIC BLOOD PRESSURE: 110 MMHG | WEIGHT: 162 LBS | BODY MASS INDEX: 29.81 KG/M2 | HEIGHT: 62 IN | HEART RATE: 72 BPM | DIASTOLIC BLOOD PRESSURE: 70 MMHG

## 2018-07-17 DIAGNOSIS — E66.3 OVERWEIGHT (BMI 25.0-29.9): ICD-10-CM

## 2018-07-17 DIAGNOSIS — E11.9 CONTROLLED TYPE 2 DIABETES MELLITUS WITHOUT COMPLICATION, WITHOUT LONG-TERM CURRENT USE OF INSULIN (HCC): Primary | ICD-10-CM

## 2018-07-17 PROCEDURE — 99214 OFFICE O/P EST MOD 30 MIN: CPT

## 2018-07-17 RX ORDER — PHENTERMINE HYDROCHLORIDE 37.5 MG/1
37.5 TABLET ORAL
Qty: 30 TABLET | Refills: 0 | Status: SHIPPED | OUTPATIENT
Start: 2018-07-17 | End: 2018-10-31 | Stop reason: ALTCHOICE

## 2018-07-17 NOTE — PATIENT INSTRUCTIONS
Diabetes: Shopping for and Preparing Meals    Having diabetes doesn’t mean you have to shop in a special aisle or look for special foods. But you will need to make choices.  By comparing items and reading food labels, you can find the healthiest foods for · Compare items and decide which is the best for your health needs. · Track the number of carbohydrates in your portions.   · Figure out how many servings of a food you can have and still stay within the number of carbohydrates for that meal.  Planning jenny · Instead of cream-based sauces or sugary glazes, flavor foods with vegetable purée, lemon or lime juice, or herb seasonings. · Remove skin from chicken and turkey before serving. · Look in cookbooks for easy, low-fat, low-sugar recipes.  When making your

## 2018-07-17 NOTE — PROGRESS NOTES
HPI:    Patient ID: Baldev Mckeon is a 35year old female. Diabetes   She presents for her follow-up diabetic visit. She has type 2 diabetes mellitus. Onset time: few years. Her disease course has been fluctuating.  There are no hypoglycemic associate pain, rhinorrhea and sore throat. Eyes: Negative for blurred vision, photophobia, discharge and visual disturbance. Respiratory: Negative for cough and shortness of breath. Cardiovascular: Negative for chest pain and palpitations.    Raye Ervin ASSESSMENT/PLAN:   1. Controlled type 2 diabetes mellitus without complication, without long-term current use of insulin ([facility] [address]  Pt reassured and all questions answered. Lab results reviewed and discussed with pt. Latest Hgb A1c controlled @ 7.3.   Refil

## 2018-10-26 ENCOUNTER — APPOINTMENT (OUTPATIENT)
Dept: LAB | Facility: HOSPITAL | Age: 33
End: 2018-10-26
Payer: MEDICAID

## 2018-10-26 DIAGNOSIS — E11.9 CONTROLLED TYPE 2 DIABETES MELLITUS WITHOUT COMPLICATION, WITHOUT LONG-TERM CURRENT USE OF INSULIN (HCC): ICD-10-CM

## 2018-10-26 PROCEDURE — 36415 COLL VENOUS BLD VENIPUNCTURE: CPT

## 2018-10-26 PROCEDURE — 83036 HEMOGLOBIN GLYCOSYLATED A1C: CPT

## 2018-10-26 PROCEDURE — 80053 COMPREHEN METABOLIC PANEL: CPT

## 2018-10-31 ENCOUNTER — OFFICE VISIT (OUTPATIENT)
Dept: FAMILY MEDICINE CLINIC | Facility: CLINIC | Age: 33
End: 2018-10-31
Payer: MEDICAID

## 2018-10-31 VITALS
HEIGHT: 62 IN | OXYGEN SATURATION: 98 % | DIASTOLIC BLOOD PRESSURE: 70 MMHG | SYSTOLIC BLOOD PRESSURE: 106 MMHG | BODY MASS INDEX: 29.63 KG/M2 | WEIGHT: 161 LBS | HEART RATE: 88 BPM

## 2018-10-31 DIAGNOSIS — IMO0001 UNCONTROLLED TYPE 2 DIABETES MELLITUS WITHOUT COMPLICATION, WITHOUT LONG-TERM CURRENT USE OF INSULIN: Primary | ICD-10-CM

## 2018-10-31 DIAGNOSIS — R14.0 ABDOMINAL DISTENSION (GASEOUS): ICD-10-CM

## 2018-10-31 DIAGNOSIS — B37.3 CANDIDAL VAGINITIS: ICD-10-CM

## 2018-10-31 PROBLEM — Z13.31 DEPRESSION SCREENING: Status: RESOLVED | Noted: 2017-06-23 | Resolved: 2018-10-31

## 2018-10-31 PROBLEM — Z30.41 ENCOUNTER FOR SURVEILLANCE OF CONTRACEPTIVE PILLS: Status: RESOLVED | Noted: 2017-11-01 | Resolved: 2018-10-31

## 2018-10-31 PROCEDURE — 99214 OFFICE O/P EST MOD 30 MIN: CPT

## 2018-10-31 RX ORDER — PIOGLITAZONEHYDROCHLORIDE 30 MG/1
30 TABLET ORAL DAILY
Qty: 30 TABLET | Refills: 0 | Status: SHIPPED | OUTPATIENT
Start: 2018-10-31 | End: 2018-11-21

## 2018-10-31 RX ORDER — FLUCONAZOLE 150 MG/1
150 TABLET ORAL ONCE
Qty: 1 TABLET | Refills: 0 | Status: SHIPPED | OUTPATIENT
Start: 2018-10-31 | End: 2018-11-21

## 2018-10-31 RX ORDER — DICYCLOMINE HYDROCHLORIDE 10 MG/1
10 CAPSULE ORAL 4 TIMES DAILY PRN
Qty: 90 CAPSULE | Refills: 0 | Status: SHIPPED | OUTPATIENT
Start: 2018-10-31 | End: 2019-04-14 | Stop reason: ALTCHOICE

## 2018-11-01 NOTE — PROGRESS NOTES
HPI:    Patient ID: Dalton Malave is a 35year old female. Diabetes   She presents for her follow-up diabetic visit. She has type 2 diabetes mellitus. Onset time: few years. Her disease course has been fluctuating.  There are no hypoglycemic associate pelvic pain. This is a new problem. The current episode started in the past 7 days. The problem occurs daily. The problem has been unchanged. She is not pregnant. Associated symptoms include abdominal pain.  Pertinent negatives include no anorexia, back nacho Glucose Blood (ONETOUCH ULTRA BLUE) In Vitro Strip Check blood sugar levels twice a day as instructed. Disp: 300 each Rfl: 3   ONETOUCH LANCETS Does not apply Misc Check blood sugar levels twice a day as instructed.  Disp: 300 each Rfl: 3     Allergies:No questions answered. Clinical course, prognosis, and treatment options of disease process discussed with pt. Rx for Fluconazole given. RTC if symptoms worsen or fail to improve and in 3 months for f/u of DM.       Orders Placed This Encounter      Comp

## 2018-11-21 ENCOUNTER — TELEPHONE (OUTPATIENT)
Dept: FAMILY MEDICINE CLINIC | Facility: CLINIC | Age: 33
End: 2018-11-21

## 2018-11-21 DIAGNOSIS — B37.3 CANDIDAL VAGINITIS: ICD-10-CM

## 2018-11-21 DIAGNOSIS — IMO0001 UNCONTROLLED TYPE 2 DIABETES MELLITUS WITHOUT COMPLICATION, WITHOUT LONG-TERM CURRENT USE OF INSULIN: ICD-10-CM

## 2018-11-21 RX ORDER — FLUCONAZOLE 150 MG/1
150 TABLET ORAL ONCE
Qty: 1 TABLET | Refills: 0 | Status: SHIPPED | OUTPATIENT
Start: 2018-11-21 | End: 2018-11-21

## 2018-11-21 RX ORDER — PIOGLITAZONEHYDROCHLORIDE 30 MG/1
30 TABLET ORAL DAILY
Qty: 30 TABLET | Refills: 1 | Status: SHIPPED | OUTPATIENT
Start: 2018-11-21 | End: 2019-02-20

## 2018-11-21 NOTE — TELEPHONE ENCOUNTER
Diflucan authorized by MD.  She states she only has #30 of Actos, and she's not due for repeat blood work until January. Will send another 2 month supply to suffice until f/up appt.   Advised that she is getting recurrent yeast infections due to her sugars

## 2019-02-08 ENCOUNTER — APPOINTMENT (OUTPATIENT)
Dept: LAB | Facility: HOSPITAL | Age: 34
End: 2019-02-08
Payer: MEDICAID

## 2019-02-08 DIAGNOSIS — IMO0001 UNCONTROLLED TYPE 2 DIABETES MELLITUS WITHOUT COMPLICATION, WITHOUT LONG-TERM CURRENT USE OF INSULIN: ICD-10-CM

## 2019-02-08 LAB
ALBUMIN SERPL BCP-MCNC: 3.5 G/DL (ref 3.5–4.8)
ALBUMIN/GLOB SERPL: 1 {RATIO} (ref 1–2)
ALP SERPL-CCNC: 52 U/L (ref 32–100)
ALT SERPL-CCNC: 26 U/L (ref 14–54)
ANION GAP SERPL CALC-SCNC: 11 MMOL/L (ref 0–18)
AST SERPL-CCNC: 35 U/L (ref 15–41)
BILIRUB SERPL-MCNC: 0.3 MG/DL (ref 0.3–1.2)
BUN SERPL-MCNC: 7 MG/DL (ref 8–20)
BUN/CREAT SERPL: 11.5 (ref 10–20)
CALCIUM SERPL-MCNC: 9.3 MG/DL (ref 8.5–10.5)
CHLORIDE SERPL-SCNC: 103 MMOL/L (ref 95–110)
CO2 SERPL-SCNC: 24 MMOL/L (ref 22–32)
CREAT SERPL-MCNC: 0.61 MG/DL (ref 0.5–1.5)
EST. AVERAGE GLUCOSE BLD GHB EST-MCNC: 189 MG/DL (ref 68–126)
GLOBULIN PLAS-MCNC: 3.5 G/DL (ref 2.5–3.7)
GLUCOSE SERPL-MCNC: 127 MG/DL (ref 70–99)
HBA1C MFR BLD HPLC: 8.2 % (ref ?–5.7)
OSMOLALITY UR CALC.SUM OF ELEC: 286 MOSM/KG (ref 275–295)
PATIENT FASTING: YES
POTASSIUM SERPL-SCNC: 4.4 MMOL/L (ref 3.3–5.1)
PROT SERPL-MCNC: 7 G/DL (ref 5.9–8.4)
SODIUM SERPL-SCNC: 138 MMOL/L (ref 136–144)

## 2019-02-08 PROCEDURE — 80053 COMPREHEN METABOLIC PANEL: CPT

## 2019-02-08 PROCEDURE — 36415 COLL VENOUS BLD VENIPUNCTURE: CPT

## 2019-02-08 PROCEDURE — 83036 HEMOGLOBIN GLYCOSYLATED A1C: CPT

## 2019-02-20 ENCOUNTER — OFFICE VISIT (OUTPATIENT)
Dept: FAMILY MEDICINE CLINIC | Facility: CLINIC | Age: 34
End: 2019-02-20
Payer: MEDICAID

## 2019-02-20 VITALS
WEIGHT: 172.38 LBS | OXYGEN SATURATION: 97 % | DIASTOLIC BLOOD PRESSURE: 70 MMHG | BODY MASS INDEX: 32 KG/M2 | HEART RATE: 88 BPM | SYSTOLIC BLOOD PRESSURE: 104 MMHG

## 2019-02-20 DIAGNOSIS — IMO0001 UNCONTROLLED TYPE 2 DIABETES MELLITUS WITHOUT COMPLICATION, WITHOUT LONG-TERM CURRENT USE OF INSULIN: Primary | ICD-10-CM

## 2019-02-20 DIAGNOSIS — Z01.89 ENCOUNTER FOR ROUTINE LABORATORY TESTING: ICD-10-CM

## 2019-02-20 DIAGNOSIS — E66.09 NON MORBID OBESITY DUE TO EXCESS CALORIES: ICD-10-CM

## 2019-02-20 DIAGNOSIS — E78.2 MIXED HYPERLIPIDEMIA: ICD-10-CM

## 2019-02-20 DIAGNOSIS — R06.81 APNEIC EPISODE: ICD-10-CM

## 2019-02-20 PROCEDURE — 99214 OFFICE O/P EST MOD 30 MIN: CPT

## 2019-02-20 RX ORDER — PIOGLITAZONEHYDROCHLORIDE 30 MG/1
30 TABLET ORAL DAILY
Qty: 30 TABLET | Refills: 0 | Status: SHIPPED | OUTPATIENT
Start: 2019-02-20 | End: 2019-04-12

## 2019-02-20 RX ORDER — NORETHINDRONE ACETATE AND ETHINYL ESTRADIOL 1.5-30(21)
KIT ORAL
Refills: 11 | COMMUNITY
Start: 2019-02-05 | End: 2019-04-12

## 2019-02-20 RX ORDER — PHENTERMINE HYDROCHLORIDE 37.5 MG/1
37.5 TABLET ORAL
Qty: 30 TABLET | Refills: 0 | Status: SHIPPED | OUTPATIENT
Start: 2019-02-20 | End: 2019-04-12 | Stop reason: ALTCHOICE

## 2019-02-22 PROBLEM — R06.81 APNEIC EPISODE: Status: ACTIVE | Noted: 2019-02-22

## 2019-02-22 PROBLEM — E66.09 NON MORBID OBESITY DUE TO EXCESS CALORIES: Status: ACTIVE | Noted: 2017-06-23

## 2019-02-22 NOTE — PATIENT INSTRUCTIONS
What Are Snoring and Obstructive Sleep Apnea? If Nba Gonzalez ever had a stuffed-up nose, you know the feeling of trying to breathe through a very narrow passageway. This is what happens in your throat when you snore.  While you sleep, structures in your throa Problems in the structure of the nose may block breathing. A crooked (deviated) septum or swollen turbinates can make snoring worse or lead to apnea. Also, a receding jaw may make the tongue sit too far back.  Then it’s more likely to block the airway when

## 2019-02-22 NOTE — PROGRESS NOTES
HPI:    Patient ID: Kathie Wilson is a 35year old female. Diabetes   She presents for her follow-up diabetic visit. She has type 2 diabetes mellitus. Onset time: few years ago. Her disease course has been fluctuating.  There are no hypoglycemic assoc abdominal pain, anorexia, arthralgias, change in bowel habit, chest pain, chills, congestion, coughing, diaphoresis, fatigue, fever, headaches, joint swelling, myalgias, nausea, neck pain, numbness, rash, sore throat, swollen glands, urinary symptoms, vert (10 mg total) by mouth 4 (four) times daily as needed. Disp: 90 capsule Rfl: 0   Glucose Blood (ONETOUCH ULTRA BLUE) In Vitro Strip Check blood sugar levels twice a day as instructed.  Disp: 300 each Rfl: 3   ONETOUCH LANCETS Does not apply Misc Check blood management and annual physical exam.      Orders Placed This Encounter      CBC, Platelet, No Differential [E]      Comp Metabolic Panel (14) [E]      Lipid Panel [E]      Urinalysis, Routine [E][If pt <2 yrs old, must also order Reducing Substance (EAV903

## 2019-02-25 ENCOUNTER — TELEPHONE (OUTPATIENT)
Dept: FAMILY MEDICINE CLINIC | Facility: CLINIC | Age: 34
End: 2019-02-25

## 2019-02-25 NOTE — TELEPHONE ENCOUNTER
Patient denies increased stress, significant weight loss, change in diet. Patient is still on OCPs. C/o cramping, regular flow (like an actual period).   She states she has had this before but the earliest gap from LMP would be 21 days and that it has nev

## 2019-02-25 NOTE — TELEPHONE ENCOUNTER
Patient is calling wanting to speak to nurse in regards to her menstrual. She states she had her menstrual 15 days ago and just got it again today. She wants to know if it is normal or should she make an appointment to be seen.

## 2019-03-13 ENCOUNTER — OFFICE VISIT (OUTPATIENT)
Dept: FAMILY MEDICINE CLINIC | Facility: CLINIC | Age: 34
End: 2019-03-13
Payer: MEDICAID

## 2019-03-13 VITALS
DIASTOLIC BLOOD PRESSURE: 70 MMHG | BODY MASS INDEX: 31.28 KG/M2 | WEIGHT: 170 LBS | SYSTOLIC BLOOD PRESSURE: 100 MMHG | OXYGEN SATURATION: 97 % | HEIGHT: 62 IN | HEART RATE: 88 BPM

## 2019-03-13 DIAGNOSIS — IMO0001 UNCONTROLLED TYPE 2 DIABETES MELLITUS WITHOUT COMPLICATION, WITHOUT LONG-TERM CURRENT USE OF INSULIN: ICD-10-CM

## 2019-03-13 DIAGNOSIS — N92.1 METRORRHAGIA: Primary | ICD-10-CM

## 2019-03-13 DIAGNOSIS — N94.6 DYSMENORRHEA: ICD-10-CM

## 2019-03-13 LAB
CUVETTE LOT #: NORMAL NUMERIC
GLUCOSE BLOOD: 273
HEMOGLOBIN: 12 G/DL (ref 12–15)
TEST STRIP LOT #: NORMAL NUMERIC

## 2019-03-13 PROCEDURE — 85018 HEMOGLOBIN: CPT

## 2019-03-13 PROCEDURE — 82962 GLUCOSE BLOOD TEST: CPT

## 2019-03-13 PROCEDURE — 99213 OFFICE O/P EST LOW 20 MIN: CPT

## 2019-03-16 ENCOUNTER — HOSPITAL ENCOUNTER (EMERGENCY)
Facility: HOSPITAL | Age: 34
Discharge: HOME | End: 2019-03-17
Attending: EMERGENCY MEDICINE
Payer: MEDICAID

## 2019-03-16 ENCOUNTER — TELEPHONE (OUTPATIENT)
Dept: FAMILY MEDICINE CLINIC | Facility: CLINIC | Age: 34
End: 2019-03-16

## 2019-03-16 DIAGNOSIS — R10.13 ABDOMINAL PAIN, EPIGASTRIC: Primary | ICD-10-CM

## 2019-03-16 PROBLEM — N92.1 METRORRHAGIA: Status: ACTIVE | Noted: 2019-03-16

## 2019-03-16 PROBLEM — N94.6 DYSMENORRHEA: Status: ACTIVE | Noted: 2019-03-16

## 2019-03-16 PROCEDURE — 99283 EMERGENCY DEPT VISIT LOW MDM: CPT

## 2019-03-16 NOTE — PROGRESS NOTES
HPI:    Patient ID: Velia Evans is a 29year old female. Menstrual Problem   The patient's primary symptoms include pelvic pain and vaginal bleeding. The patient's pertinent negatives include no vaginal discharge. This is a new problem.  Episode ons Current Outpatient Medications:  BLISOVI FE 1.5/30 1.5-30 MG-MCG Oral Tab TK 1 T PO D Disp:  Rfl: 11   Pioglitazone HCl (ACTOS) 30 MG Oral Tab Take 1 tablet (30 mg total) by mouth daily.  Disp: 30 tablet Rfl: 0   Phentermine HCl 37.5 MG Oral Tab Take 1 annual physical exam.       Orders Placed This Encounter      POC Hemoglobin [76021]      POC Finger stick glucose [93454]      Meds This Visit:  Requested Prescriptions      No prescriptions requested or ordered in this encounter       Imaging & Referrals

## 2019-03-16 NOTE — TELEPHONE ENCOUNTER
Home call    Abdominal Pain  -for past 2 days  -intermittent, sharp pain  -some postprandial association  -mainly in epigastric region  -some nausea, acid reflux  -no vomiting, f/c, diarrhea, constipation, blood in stool  -only tried ibuprofen which helped

## 2019-03-16 NOTE — PATIENT INSTRUCTIONS
Dysfunctional Uterine Bleeding    Dysfunctional uterine bleeding, also called abnormal uterine bleeding, is a condition in which bleeding is abnormal and occurs at unexpected times of the month.  This happens because of changes in the hormones that help c · Signs of anemia, such as pale skin, extreme fatigue or weakness, or shortness of breath  · Dizziness or fainting   Date Last Reviewed: 11/1/2017  © 8779-5412 [employer] [address]. [address] [facility], [address] [place] [address]. All rights reserved.  This

## 2019-03-17 VITALS
TEMPERATURE: 98 F | OXYGEN SATURATION: 100 % | SYSTOLIC BLOOD PRESSURE: 124 MMHG | RESPIRATION RATE: 18 BRPM | DIASTOLIC BLOOD PRESSURE: 85 MMHG | HEART RATE: 89 BPM | WEIGHT: 169 LBS

## 2019-03-17 RX ORDER — HYDROCODONE BITARTRATE AND ACETAMINOPHEN 5; 325 MG/1; MG/1
1 TABLET ORAL EVERY 6 HOURS PRN
Qty: 10 TABLET | Refills: 0 | Status: SHIPPED | OUTPATIENT
Start: 2019-03-17 | End: 2019-04-14 | Stop reason: ALTCHOICE

## 2019-03-17 RX ORDER — HYDROCODONE BITARTRATE AND ACETAMINOPHEN 5; 325 MG/1; MG/1
2 TABLET ORAL ONCE
Status: COMPLETED | OUTPATIENT
Start: 2019-03-17 | End: 2019-03-17

## 2019-03-17 RX ORDER — DICYCLOMINE HCL 20 MG
20 TABLET ORAL ONCE
Status: COMPLETED | OUTPATIENT
Start: 2019-03-17 | End: 2019-03-17

## 2019-03-17 NOTE — ED NOTES
Patient added at the end of triage that she has been feeling depressed recently and wishes that she would go to sleep and not wake up.

## 2019-03-17 NOTE — ED PROVIDER NOTES
Patient Seen in: [facility] AND [facility] Emergency Department    History   Patient presents with:  Abdomen/Flank Pain (GI/)  Eval-P (psychiatric)      HPI    Patient presents to the ED complaining of epigastric abdominal pain starting yesterday with associat Bowel sounds are normal. She exhibits no distension. There is tenderness. There is no rebound and no guarding. Mild epigastric tenderness to palpation, no rebound or guarding   Musculoskeletal: She exhibits no edema or deformity.    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     Condition upon leaving the department: Stable    Disposition and Plan     Clinical Impression:  Abdominal pain, epigastric  (primary encounter diagnosis)    Disposition:  Discharge    Follow-up:  Stephenie Vaughan MD  [address]

## 2019-03-17 NOTE — ED NOTES
Pt reports epigastric pain with loose stools since yesterday. States symptoms have currently resolved. Low risk on CSSRS scale. Denies SI/HI or a plan at this time.

## 2019-03-17 NOTE — ED INITIAL ASSESSMENT (HPI)
Triage:  Patient c/o epigastric abdominal pain with diarrhea since yesterday. +nausea but no vomiting. Denies fever. Denies urinary complaints.

## 2019-03-19 DIAGNOSIS — IMO0001 UNCONTROLLED TYPE 2 DIABETES MELLITUS WITHOUT COMPLICATION, WITHOUT LONG-TERM CURRENT USE OF INSULIN: ICD-10-CM

## 2019-03-22 RX ORDER — PIOGLITAZONEHYDROCHLORIDE 30 MG/1
TABLET ORAL
Qty: 30 TABLET | Refills: 0 | OUTPATIENT
Start: 2019-03-22

## 2019-04-08 ENCOUNTER — APPOINTMENT (OUTPATIENT)
Dept: LAB | Facility: HOSPITAL | Age: 34
End: 2019-04-08
Payer: MEDICAID

## 2019-04-08 DIAGNOSIS — Z01.89 ENCOUNTER FOR ROUTINE LABORATORY TESTING: ICD-10-CM

## 2019-04-08 DIAGNOSIS — E78.2 MIXED HYPERLIPIDEMIA: ICD-10-CM

## 2019-04-08 DIAGNOSIS — IMO0001 UNCONTROLLED TYPE 2 DIABETES MELLITUS WITHOUT COMPLICATION, WITHOUT LONG-TERM CURRENT USE OF INSULIN: ICD-10-CM

## 2019-04-08 PROCEDURE — 82043 UR ALBUMIN QUANTITATIVE: CPT

## 2019-04-08 PROCEDURE — 83036 HEMOGLOBIN GLYCOSYLATED A1C: CPT

## 2019-04-08 PROCEDURE — 80061 LIPID PANEL: CPT

## 2019-04-08 PROCEDURE — 85027 COMPLETE CBC AUTOMATED: CPT

## 2019-04-08 PROCEDURE — 84443 ASSAY THYROID STIM HORMONE: CPT

## 2019-04-08 PROCEDURE — 81001 URINALYSIS AUTO W/SCOPE: CPT

## 2019-04-08 PROCEDURE — 36415 COLL VENOUS BLD VENIPUNCTURE: CPT

## 2019-04-08 PROCEDURE — 80053 COMPREHEN METABOLIC PANEL: CPT

## 2019-04-08 PROCEDURE — 82570 ASSAY OF URINE CREATININE: CPT

## 2019-04-10 ENCOUNTER — HOSPITAL ENCOUNTER (OUTPATIENT)
Dept: ULTRASOUND IMAGING | Facility: HOSPITAL | Age: 34
Discharge: HOME | End: 2019-04-10
Payer: MEDICAID

## 2019-04-10 DIAGNOSIS — N92.1 METRORRHAGIA: ICD-10-CM

## 2019-04-10 DIAGNOSIS — N94.6 DYSMENORRHEA: ICD-10-CM

## 2019-04-10 PROCEDURE — 76856 US EXAM PELVIC COMPLETE: CPT

## 2019-04-10 PROCEDURE — 76830 TRANSVAGINAL US NON-OB: CPT

## 2019-04-12 ENCOUNTER — OFFICE VISIT (OUTPATIENT)
Dept: FAMILY MEDICINE CLINIC | Facility: CLINIC | Age: 34
End: 2019-04-12
Payer: MEDICAID

## 2019-04-12 VITALS
HEIGHT: 62 IN | HEART RATE: 90 BPM | SYSTOLIC BLOOD PRESSURE: 126 MMHG | OXYGEN SATURATION: 97 % | WEIGHT: 171 LBS | BODY MASS INDEX: 31.47 KG/M2 | DIASTOLIC BLOOD PRESSURE: 70 MMHG

## 2019-04-12 DIAGNOSIS — N92.1 METRORRHAGIA: ICD-10-CM

## 2019-04-12 DIAGNOSIS — N39.3 FEMALE STRESS INCONTINENCE: ICD-10-CM

## 2019-04-12 DIAGNOSIS — R06.81 APNEIC EPISODE: ICD-10-CM

## 2019-04-12 DIAGNOSIS — IMO0001 UNCONTROLLED TYPE 2 DIABETES MELLITUS WITHOUT COMPLICATION, WITHOUT LONG-TERM CURRENT USE OF INSULIN: ICD-10-CM

## 2019-04-12 DIAGNOSIS — H43.393 VITREOUS FLOATERS OF BOTH EYES: ICD-10-CM

## 2019-04-12 DIAGNOSIS — R79.89 ELEVATED LFTS: ICD-10-CM

## 2019-04-12 DIAGNOSIS — F41.9 ANXIETY: ICD-10-CM

## 2019-04-12 DIAGNOSIS — N94.6 DYSMENORRHEA: ICD-10-CM

## 2019-04-12 DIAGNOSIS — Z00.01 ENCOUNTER FOR ROUTINE ADULT HEALTH EXAMINATION WITH ABNORMAL FINDINGS: Primary | ICD-10-CM

## 2019-04-12 DIAGNOSIS — E78.2 MIXED HYPERLIPIDEMIA: ICD-10-CM

## 2019-04-12 DIAGNOSIS — E66.09 CLASS 1 OBESITY DUE TO EXCESS CALORIES WITH SERIOUS COMORBIDITY AND BODY MASS INDEX (BMI) OF 31.0 TO 31.9 IN ADULT: ICD-10-CM

## 2019-04-12 PROCEDURE — 99395 PREV VISIT EST AGE 18-39: CPT

## 2019-04-12 RX ORDER — PIOGLITAZONEHYDROCHLORIDE 45 MG/1
45 TABLET ORAL DAILY
Qty: 30 TABLET | Refills: 2 | Status: SHIPPED | OUTPATIENT
Start: 2019-04-12 | End: 2019-09-10

## 2019-04-12 RX ORDER — SIMVASTATIN 20 MG
20 TABLET ORAL NIGHTLY
Qty: 30 TABLET | Refills: 2 | Status: SHIPPED | OUTPATIENT
Start: 2019-04-12 | End: 2019-09-10

## 2019-04-14 PROBLEM — R79.89 ELEVATED LFTS: Status: ACTIVE | Noted: 2019-04-08

## 2019-04-14 PROBLEM — E66.09 NON MORBID OBESITY DUE TO EXCESS CALORIES: Status: RESOLVED | Noted: 2017-06-23 | Resolved: 2019-04-14

## 2019-04-15 NOTE — PATIENT INSTRUCTIONS
Prevention Guidelines, Women Ages 25 to 44  Screening tests and vaccines are an important part of managing your health. A screening test is done to find possible disorders or diseases in people who don't have any symptoms.  The goal is to find a disease e Type 2 diabetes All women with prediabetes Every year   Gonorrhea Sexually active women at increased risk for infection At routine exams   Hepatitis C Anyone at increased risk At routine exams   HIV All women should be tested at least once for HIV between Meningococcal Women at increased risk for infection should talk with their healthcare provider 1 or more doses   Pneumococcal conjugate vaccine (PCV13) and pneumococcal polysaccharide vaccine (PPSV23) Women at increased risk for infection should talk with © 9100-4135 [employer]. [address] [facility], [facility] [place]. All rights reserved. This information is not intended as a substitute for professional medical care. Always follow your healthcare professional's instructions.

## 2019-04-15 NOTE — PROGRESS NOTES
CC: Annual Physical Exam    HPI:   Patricia Cueva is a 29year old female who presents for a complete physical exam. Symptoms: denies discharge, itching, burning or dysuria. Patient denies any acute complaints at this time.      Wt Readings from Last 6 En Hazy (A) Clear    Spec Gravity 1.027 1.002 - 1.035    pH Urine 5.0 5.0 - 8.0    Protein Urine Negative Negative mg/dL    Glucose Urine Negative Negative mg/dL    Ketones Urine Negative Negative mg/dL    Bilirubin Urine Negative Negative    Blood Urine Larg paternal side      Social History:   Social History    Tobacco Use      Smoking status: Never Smoker      Smokeless tobacco: Never Used    Alcohol use: Yes    Drug use: No    Occ: unemployed. : yes. Children: 3 sons.    Exercise: minimal.  Diet: is alert, awake and oriented, well developed, well nourished, no apparent distress.   HEENT:  Head:  Normocephalic, atraumatic Eyes: EOMI, PERRLA, no scleral icterus, conjunctivae clear bilaterally, no eye discharge Ears: TM's clear and intact bilaterally, Nghia (Ophthalmology) for diabetic eye exam.    3. Mixed hyperlipidemia  - Clinical course, prognosis, and treatment options of disease process discussed with pt. - Pt started on Simvastatin 20mg PO QHS. - Repeat CMP and lipid panel in 3 months.     4.

## 2019-09-07 ENCOUNTER — APPOINTMENT (OUTPATIENT)
Dept: LAB | Facility: HOSPITAL | Age: 34
End: 2019-09-07
Payer: MEDICAID

## 2019-09-07 DIAGNOSIS — IMO0001 UNCONTROLLED TYPE 2 DIABETES MELLITUS WITHOUT COMPLICATION, WITHOUT LONG-TERM CURRENT USE OF INSULIN: ICD-10-CM

## 2019-09-07 DIAGNOSIS — E78.2 MIXED HYPERLIPIDEMIA: ICD-10-CM

## 2019-09-07 LAB
ALBUMIN SERPL-MCNC: 3.3 G/DL (ref 3.4–5)
ALBUMIN/GLOB SERPL: 0.9 {RATIO} (ref 1–2)
ALP LIVER SERPL-CCNC: 85 U/L (ref 37–98)
ALT SERPL-CCNC: 37 U/L (ref 13–56)
ANION GAP SERPL CALC-SCNC: 7 MMOL/L (ref 0–18)
AST SERPL-CCNC: 24 U/L (ref 15–37)
BILIRUB SERPL-MCNC: 0.3 MG/DL (ref 0.1–2)
BUN BLD-MCNC: 9 MG/DL (ref 7–18)
BUN/CREAT SERPL: 13.4 (ref 10–20)
CALCIUM BLD-MCNC: 8.7 MG/DL (ref 8.5–10.1)
CHLORIDE SERPL-SCNC: 101 MMOL/L (ref 98–112)
CHOLEST SMN-MCNC: 176 MG/DL (ref ?–200)
CO2 SERPL-SCNC: 29 MMOL/L (ref 21–32)
CREAT BLD-MCNC: 0.67 MG/DL (ref 0.55–1.02)
EST. AVERAGE GLUCOSE BLD GHB EST-MCNC: 246 MG/DL (ref 68–126)
GLOBULIN PLAS-MCNC: 3.6 G/DL (ref 2.8–4.4)
GLUCOSE BLD-MCNC: 260 MG/DL (ref 70–99)
HBA1C MFR BLD HPLC: 10.2 % (ref ?–5.7)
HDLC SERPL-MCNC: 29 MG/DL (ref 40–59)
LDLC SERPL CALC-MCNC: 110 MG/DL (ref ?–100)
M PROTEIN MFR SERPL ELPH: 6.9 G/DL (ref 6.4–8.2)
NONHDLC SERPL-MCNC: 147 MG/DL (ref ?–130)
OSMOLALITY SERPL CALC.SUM OF ELEC: 292 MOSM/KG (ref 275–295)
PATIENT FASTING: YES
PATIENT FASTING: YES
POTASSIUM SERPL-SCNC: 3.7 MMOL/L (ref 3.5–5.1)
SODIUM SERPL-SCNC: 137 MMOL/L (ref 136–145)
TRIGL SERPL-MCNC: 183 MG/DL (ref 30–149)
VLDLC SERPL CALC-MCNC: 37 MG/DL (ref 0–30)

## 2019-09-07 PROCEDURE — 80061 LIPID PANEL: CPT

## 2019-09-07 PROCEDURE — 80053 COMPREHEN METABOLIC PANEL: CPT

## 2019-09-07 PROCEDURE — 83036 HEMOGLOBIN GLYCOSYLATED A1C: CPT

## 2019-09-07 PROCEDURE — 36415 COLL VENOUS BLD VENIPUNCTURE: CPT

## 2019-09-10 ENCOUNTER — OFFICE VISIT (OUTPATIENT)
Dept: FAMILY MEDICINE CLINIC | Facility: CLINIC | Age: 34
End: 2019-09-10
Payer: MEDICAID

## 2019-09-10 VITALS
WEIGHT: 170 LBS | SYSTOLIC BLOOD PRESSURE: 90 MMHG | HEART RATE: 67 BPM | BODY MASS INDEX: 31.28 KG/M2 | DIASTOLIC BLOOD PRESSURE: 62 MMHG | OXYGEN SATURATION: 98 % | HEIGHT: 62 IN

## 2019-09-10 DIAGNOSIS — E78.2 MIXED HYPERLIPIDEMIA: ICD-10-CM

## 2019-09-10 DIAGNOSIS — IMO0001 UNCONTROLLED TYPE 2 DIABETES MELLITUS WITHOUT COMPLICATION, WITHOUT LONG-TERM CURRENT USE OF INSULIN: Primary | ICD-10-CM

## 2019-09-10 PROCEDURE — 99213 OFFICE O/P EST LOW 20 MIN: CPT

## 2019-09-10 RX ORDER — SIMVASTATIN 20 MG
20 TABLET ORAL NIGHTLY
Qty: 30 TABLET | Refills: 2 | Status: SHIPPED | OUTPATIENT
Start: 2019-09-10 | End: 2020-04-30

## 2019-09-10 RX ORDER — GLIPIZIDE 5 MG/1
5 TABLET ORAL
Qty: 60 TABLET | Refills: 2 | Status: SHIPPED | OUTPATIENT
Start: 2019-09-10 | End: 2020-04-30

## 2019-09-10 RX ORDER — PIOGLITAZONEHYDROCHLORIDE 45 MG/1
45 TABLET ORAL DAILY
Qty: 30 TABLET | Refills: 2 | Status: SHIPPED | OUTPATIENT
Start: 2019-09-10 | End: 2020-07-31

## 2019-09-10 NOTE — PROGRESS NOTES
HPI:    Patient ID: Mariely Moseley is a 29year old female. Diabetes   She presents for her follow-up diabetic visit. She has type 2 diabetes mellitus. Onset time: few years ago. Her disease course has been fluctuating.  There are no hypoglycemic assoc Negative for dizziness, syncope, light-headedness and headaches. All other systems reviewed and are negative. Current Outpatient Medications:  Pioglitazone HCl 45 MG Oral Tab Take 1 tablet (45 mg total) by mouth daily.  Disp: 30 tablet Rfl: 2 Prescriptions     Signed Prescriptions Disp Refills   • Pioglitazone HCl 45 MG Oral Tab 30 tablet 2     Sig: Take 1 tablet (45 mg total) by mouth daily. • simvastatin 20 MG Oral Tab 30 tablet 2     Sig: Take 1 tablet (20 mg total) by mouth nightly.    • g

## 2019-09-10 NOTE — PATIENT INSTRUCTIONS
Managing Type 2 Diabetes    Type 2 diabetes is a long-term (chronic) condition. Managing diabetes may mean making some tough changes. Your healthcare team can help you.   To manage type 2 diabetes, you'll need to balance your medicine with diet and acti Checking your own blood sugar may be a regular part of your care. Or you may only need to check your blood sugar from time to time. Your healthcare provider will tell you how to check your blood sugar at home.  Checking it tells you if your blood sugar is i · Smoking. If you smoke, you will need to quit. Smoking makes it more likely to get complications from diabetes. Ask your healthcare provider about ways to quit. · Vaccines. Get a yearly flu shot.  And ask your healthcare provider about vaccines to prevent

## 2019-10-03 ENCOUNTER — TELEPHONE (OUTPATIENT)
Dept: FAMILY MEDICINE CLINIC | Facility: CLINIC | Age: 34
End: 2019-10-03

## 2019-10-03 NOTE — TELEPHONE ENCOUNTER
Called patient to remind her about the eye exam she was due to have done , and she says she already went and saw Dr Ashley First, I called toget her consult notes and Oliva Krishna will fax it to us buy the end of the day.

## 2020-01-20 ENCOUNTER — APPOINTMENT (OUTPATIENT)
Dept: GENERAL RADIOLOGY | Facility: HOSPITAL | Age: 35
End: 2020-01-20
Attending: EMERGENCY MEDICINE
Payer: COMMERCIAL

## 2020-01-20 ENCOUNTER — HOSPITAL ENCOUNTER (EMERGENCY)
Facility: HOSPITAL | Age: 35
Discharge: HOME | End: 2020-01-20
Attending: EMERGENCY MEDICINE
Payer: COMMERCIAL

## 2020-01-20 VITALS
TEMPERATURE: 99 F | SYSTOLIC BLOOD PRESSURE: 137 MMHG | OXYGEN SATURATION: 96 % | DIASTOLIC BLOOD PRESSURE: 85 MMHG | RESPIRATION RATE: 20 BRPM | HEIGHT: 62 IN | BODY MASS INDEX: 31.28 KG/M2 | WEIGHT: 170 LBS | HEART RATE: 87 BPM

## 2020-01-20 DIAGNOSIS — J06.9 UPPER RESPIRATORY TRACT INFECTION, UNSPECIFIED TYPE: Primary | ICD-10-CM

## 2020-01-20 DIAGNOSIS — R73.9 HYPERGLYCEMIA: ICD-10-CM

## 2020-01-20 LAB
FLUAV + FLUBV RNA SPEC NAA+PROBE: NEGATIVE
GLUCOSE BLDC GLUCOMTR-MCNC: 216 MG/DL (ref 70–99)
S PYO AG THROAT QL: NEGATIVE

## 2020-01-20 PROCEDURE — 82962 GLUCOSE BLOOD TEST: CPT

## 2020-01-20 PROCEDURE — 99284 EMERGENCY DEPT VISIT MOD MDM: CPT

## 2020-01-20 PROCEDURE — 71045 X-RAY EXAM CHEST 1 VIEW: CPT | Performed by: EMERGENCY MEDICINE

## 2020-01-20 PROCEDURE — 87631 RESP VIRUS 3-5 TARGETS: CPT | Performed by: EMERGENCY MEDICINE

## 2020-01-20 PROCEDURE — 87430 STREP A AG IA: CPT

## 2020-01-20 RX ORDER — GLIPIZIDE 5 MG/1
5 TABLET ORAL
Qty: 60 TABLET | Refills: 0 | Status: SHIPPED | OUTPATIENT
Start: 2020-01-20 | End: 2020-02-19

## 2020-01-20 RX ORDER — AZITHROMYCIN 250 MG/1
TABLET, FILM COATED ORAL
Qty: 1 PACKAGE | Refills: 0 | Status: SHIPPED | OUTPATIENT
Start: 2020-01-20 | End: 2020-01-25

## 2020-01-20 RX ORDER — BENZONATATE 100 MG/1
100 CAPSULE ORAL 3 TIMES DAILY PRN
Qty: 30 CAPSULE | Refills: 0 | Status: SHIPPED | OUTPATIENT
Start: 2020-01-20 | End: 2020-02-19

## 2020-01-21 NOTE — ED INITIAL ASSESSMENT (HPI)
Pt presents to ER with complaints of flu like symptoms, headacne , throat pain, cough,back pain that started yesterday. Per pt her Son is sick, pt thinking she got the sickness from him. Pt A/O x 4, breathing is non labored.

## 2020-01-21 NOTE — ED PROVIDER NOTES
Patient Seen in: [facility] AND [facility] Emergency Department    History   Patient presents with:  Fever    Stated Complaint: Flu like symptoms    HPI    Patient here with fever, body aches, headache, sore throat, cough, congestion for couple days.   No travel, negative except as noted above. PSFH elements reviewed from today and agreed except as otherwise stated in HPI.     Physical Exam     ED Triage Vitals [01/20/20 1917]   /77   Pulse 99   Resp 18   Temp 99.4 °F (37.4 °C)   Temp src Oral   SpO2 98 % [address]  [place] [address]  [phone]            Medications Prescribed:  Discharge Medication List as of 1/20/2020  9:10 PM    START taking these medications    !! glipiZIDE 5 MG Oral Tab  Take 1 tablet (5 mg total) by mouth 2 (two) times daily before meal

## 2020-04-29 ENCOUNTER — TELEPHONE (OUTPATIENT)
Dept: INTERNAL MEDICINE CLINIC | Facility: CLINIC | Age: 35
End: 2020-04-29

## 2020-04-29 NOTE — TELEPHONE ENCOUNTER
Patient calling in to schedule a physical and to discuss medications however she has not been seen before by Dr. Korin Baird.   Per patient, her last MD was Dr. Emely Weinberg but that doctor  in November and she was advised to see Dr. Korin Baird and patient n

## 2020-04-29 NOTE — TELEPHONE ENCOUNTER
Left a detailed message to cell (ok per RUDDY)   advised her to call and ask for a telephone visit to be scheduled with Franki Perez for tomorrow.

## 2020-04-29 NOTE — TELEPHONE ENCOUNTER
We can do a initial phone visit and I get get her labs ordered. We can bring her in for a full physical over the summer but at least we can get the ball rolling. You may schedule her for me at 10:30 A. M. Tomorrow if that works for her.     SURJIT Breaux

## 2020-04-30 ENCOUNTER — VIRTUAL PHONE E/M (OUTPATIENT)
Dept: INTERNAL MEDICINE CLINIC | Facility: CLINIC | Age: 35
End: 2020-04-30
Payer: MEDICAID

## 2020-04-30 DIAGNOSIS — R79.89 ELEVATED LFTS: ICD-10-CM

## 2020-04-30 DIAGNOSIS — E11.8 UNCONTROLLED TYPE 2 DIABETES MELLITUS WITH COMPLICATION, WITHOUT LONG-TERM CURRENT USE OF INSULIN (HCC): ICD-10-CM

## 2020-04-30 DIAGNOSIS — E78.2 MIXED HYPERLIPIDEMIA: ICD-10-CM

## 2020-04-30 DIAGNOSIS — E11.65 TYPE 2 DIABETES MELLITUS WITH HYPERGLYCEMIA, WITHOUT LONG-TERM CURRENT USE OF INSULIN (HCC): Primary | ICD-10-CM

## 2020-04-30 DIAGNOSIS — E11.65 UNCONTROLLED TYPE 2 DIABETES MELLITUS WITH COMPLICATION, WITHOUT LONG-TERM CURRENT USE OF INSULIN (HCC): ICD-10-CM

## 2020-04-30 DIAGNOSIS — R73.9 HYPERGLYCEMIA: ICD-10-CM

## 2020-04-30 PROCEDURE — 99202 OFFICE O/P NEW SF 15 MIN: CPT | Performed by: NURSE PRACTITIONER

## 2020-04-30 RX ORDER — SIMVASTATIN 20 MG
20 TABLET ORAL NIGHTLY
Qty: 30 TABLET | Refills: 2 | Status: SHIPPED | OUTPATIENT
Start: 2020-04-30 | End: 2020-07-31

## 2020-04-30 RX ORDER — GLIPIZIDE 5 MG/1
5 TABLET ORAL
Qty: 60 TABLET | Refills: 2 | Status: SHIPPED | OUTPATIENT
Start: 2020-04-30 | End: 2020-07-31

## 2020-04-30 NOTE — PROGRESS NOTES
HPI:    Patient ID: Kishore Maher is a 28year old female  LMP is this month. She is not using birth control. .Please note that the following visit was completed using two-way, real-time interactive audio and/or video communication.   This has been done i not nervous/anxious. Current Outpatient Medications   Medication Sig Dispense Refill   • simvastatin 20 MG Oral Tab Take 1 tablet (20 mg total) by mouth nightly.  30 tablet 2   • glipiZIDE 5 MG Oral Tab Take 1 tablet (5 mg total) by mouth 2 (two would like to get back on track with her glucose levels and obtain blood work. She has a meter at home called [employer]. She does not have any test strips for the machine. She denies any polydipsia, polyuria, or polyphagia.     Plan  1) We will plan on ord

## 2020-04-30 NOTE — ASSESSMENT & PLAN NOTE
A/P-this is my first time speaking to this patient. This is a telephone visit. Patient is a 24-year-old female with 3 children. She states she became diabetic with her last pregnancy.   She admits to being overweight and she has not taken her diabetes me

## 2020-04-30 NOTE — ASSESSMENT & PLAN NOTE
A/P in review of her chart it appears that she has had a history of elevated LFTs. Will resume her simvastatin medication and have blood drawn in the morning to assess these.     Plan  1) Refill simvastatin 20 mg p.o. to be taken at at bedtime daily

## 2020-05-01 ENCOUNTER — LAB ENCOUNTER (OUTPATIENT)
Dept: LAB | Facility: HOSPITAL | Age: 35
End: 2020-05-01
Attending: NURSE PRACTITIONER
Payer: MEDICAID

## 2020-05-01 ENCOUNTER — TELEPHONE (OUTPATIENT)
Dept: INTERNAL MEDICINE CLINIC | Facility: CLINIC | Age: 35
End: 2020-05-01

## 2020-05-01 ENCOUNTER — TELEPHONE (OUTPATIENT)
Dept: FAMILY MEDICINE CLINIC | Facility: CLINIC | Age: 35
End: 2020-05-01

## 2020-05-01 DIAGNOSIS — E11.65 TYPE 2 DIABETES MELLITUS WITH HYPERGLYCEMIA, WITHOUT LONG-TERM CURRENT USE OF INSULIN (HCC): ICD-10-CM

## 2020-05-01 DIAGNOSIS — IMO0001 UNCONTROLLED TYPE 2 DIABETES MELLITUS WITHOUT COMPLICATION, WITHOUT LONG-TERM CURRENT USE OF INSULIN: ICD-10-CM

## 2020-05-01 DIAGNOSIS — E78.2 MIXED HYPERLIPIDEMIA: ICD-10-CM

## 2020-05-01 PROCEDURE — 82043 UR ALBUMIN QUANTITATIVE: CPT

## 2020-05-01 PROCEDURE — 36415 COLL VENOUS BLD VENIPUNCTURE: CPT

## 2020-05-01 PROCEDURE — 85025 COMPLETE CBC W/AUTO DIFF WBC: CPT

## 2020-05-01 PROCEDURE — 80061 LIPID PANEL: CPT

## 2020-05-01 PROCEDURE — 80053 COMPREHEN METABOLIC PANEL: CPT

## 2020-05-01 PROCEDURE — 83036 HEMOGLOBIN GLYCOSYLATED A1C: CPT

## 2020-05-01 PROCEDURE — 82570 ASSAY OF URINE CREATININE: CPT

## 2020-05-01 NOTE — TELEPHONE ENCOUNTER
Patient has a different PCP now and will be calling them to get her results. Dr Pickering Peer notified.

## 2020-05-01 NOTE — TELEPHONE ENCOUNTER
----- Message from Charleen Rodríguez MD sent at 5/1/2020 11:38 AM CDT -----  Please add her as a virtual to discuss labs

## 2020-05-03 ENCOUNTER — TELEPHONE (OUTPATIENT)
Dept: INTERNAL MEDICINE CLINIC | Facility: CLINIC | Age: 35
End: 2020-05-03

## 2020-05-03 NOTE — TELEPHONE ENCOUNTER
Patricia Cueva    Patient called. No answer. Please call me Monday afternoon so we can review your lab and treatment plan.     Nichole Ragland, ANP

## 2020-05-04 ENCOUNTER — TELEPHONE (OUTPATIENT)
Dept: INTERNAL MEDICINE CLINIC | Facility: CLINIC | Age: 35
End: 2020-05-04

## 2020-05-04 ENCOUNTER — VIRTUAL PHONE E/M (OUTPATIENT)
Dept: INTERNAL MEDICINE CLINIC | Facility: CLINIC | Age: 35
End: 2020-05-04
Payer: MEDICAID

## 2020-05-04 DIAGNOSIS — E13.9 DIABETES 1.5, MANAGED AS TYPE 2 (HCC): ICD-10-CM

## 2020-05-04 DIAGNOSIS — E11.65 TYPE 2 DIABETES MELLITUS WITH HYPERGLYCEMIA, WITHOUT LONG-TERM CURRENT USE OF INSULIN (HCC): Primary | ICD-10-CM

## 2020-05-04 DIAGNOSIS — R80.9 TYPE 2 DIABETES MELLITUS WITH MICROALBUMINURIA, WITHOUT LONG-TERM CURRENT USE OF INSULIN (HCC): ICD-10-CM

## 2020-05-04 DIAGNOSIS — E11.29 TYPE 2 DIABETES MELLITUS WITH MICROALBUMINURIA, WITHOUT LONG-TERM CURRENT USE OF INSULIN (HCC): ICD-10-CM

## 2020-05-04 PROCEDURE — 99213 OFFICE O/P EST LOW 20 MIN: CPT | Performed by: NURSE PRACTITIONER

## 2020-05-04 RX ORDER — LANCETS
EACH MISCELLANEOUS
Qty: 100 EACH | Refills: 3 | Status: SHIPPED | OUTPATIENT
Start: 2020-05-04

## 2020-05-04 RX ORDER — GLIPIZIDE 5 MG/1
5 TABLET ORAL
Qty: 120 TABLET | Refills: 3 | Status: SHIPPED | OUTPATIENT
Start: 2020-05-04 | End: 2020-07-31

## 2020-05-04 NOTE — TELEPHONE ENCOUNTER
Cannot get into see Dr. Jn Zamora until 7/31/20  Would like to speak with Mohamud Kelley What to do next.

## 2020-05-04 NOTE — PROGRESS NOTES
HPI:    Patient ID: Seng Cespedes is a 28year old female. Please note that the following visit was completed using two-way, real-time interactive audio and/or video communication.   This has been done in [place] to provide continuity of care in the and hematuria. Musculoskeletal: Negative for back pain and joint swelling. Skin: Negative for rash. Neurological: Negative for weakness, numbness and headaches. Hematological: Does not bruise/bleed easily.    Psychiatric/Behavioral: The patient is n instructed her the importance of taking this as soon as possible. We discussed diet and her lab test.    Plan  1) Follow up with Dr. Jn Zamora  2) Patient instructed to start glipizide 5 mg po BID before breakfast and dinner. 3) Must walk 30 minutes per day.

## 2020-05-04 NOTE — TELEPHONE ENCOUNTER
I texted patient a plant protein diet. She is to start Glipizide 5mg before breakfast and dinner. She is to take her glucose level before breakfast and dinner,  She is to call me next week so we can review her glucose readings.   She is to walk 30 minutes

## 2020-05-04 NOTE — TELEPHONE ENCOUNTER
Numerous phone calls back and forth with patient and [facility]. They would not fill her medication because they did not have her insurance on file. Plan- She will pay for a 7 say supply so she can start the medication.  This will give her time to get her

## 2020-05-04 NOTE — ASSESSMENT & PLAN NOTE
A/P-Diabetes-28year-old female who is a history of gestational diabetes. She ran out of her glipizide and she was calling last week to get a refill. Her primary care physician is no longer in the system.   Lab work was ordered and she had a hemoglobin A1

## 2020-06-22 ENCOUNTER — TELEPHONE (OUTPATIENT)
Dept: INTERNAL MEDICINE CLINIC | Facility: CLINIC | Age: 35
End: 2020-06-22

## 2020-06-22 ENCOUNTER — VIRTUAL PHONE E/M (OUTPATIENT)
Dept: INTERNAL MEDICINE CLINIC | Facility: CLINIC | Age: 35
End: 2020-06-22
Payer: MEDICAID

## 2020-06-22 DIAGNOSIS — N76.0 ACUTE VAGINITIS: Primary | ICD-10-CM

## 2020-06-22 PROCEDURE — 99213 OFFICE O/P EST LOW 20 MIN: CPT | Performed by: NURSE PRACTITIONER

## 2020-06-22 RX ORDER — FLUCONAZOLE 150 MG/1
150 TABLET ORAL ONCE
Qty: 2 TABLET | Refills: 0 | Status: SHIPPED | OUTPATIENT
Start: 2020-06-22 | End: 2020-06-22

## 2020-06-22 NOTE — ASSESSMENT & PLAN NOTE
A/P 28-year-old female who I had spoken on the phone before but never seen in person. In reviewing her hemoglobin A1c her last reading was 11.6. At that time she was referred to Dr. Luis Antonio Martinez and she made an appointment and she is awaiting that appointment.

## 2020-06-22 NOTE — PROGRESS NOTES
HPI:    Patient ID: Sunita Tripp is a 28year old female. ..... Kaylee Sales Please note that the following visit was completed using two-way, real-time interactive audio and/or video communication.   This has been done in [place] to provide continuity of care for weakness, numbness and headaches. Hematological: Does not bruise/bleed easily. Psychiatric/Behavioral: The patient is not nervous/anxious.              Current Outpatient Medications   Medication Sig Dispense Refill   • fluconazole (DIFLUCAN) 150 MG spoken on the phone before but never seen in person. In reviewing her hemoglobin A1c her last reading was 11.6. At that time she was referred to Dr. Kayla Mcintosh and she made an appointment and she is awaiting that appointment.   In the meantime patient was star

## 2020-07-31 ENCOUNTER — OFFICE VISIT (OUTPATIENT)
Dept: ENDOCRINOLOGY CLINIC | Facility: CLINIC | Age: 35
End: 2020-07-31
Payer: MEDICAID

## 2020-07-31 ENCOUNTER — TELEPHONE (OUTPATIENT)
Dept: ENDOCRINOLOGY CLINIC | Facility: CLINIC | Age: 35
End: 2020-07-31

## 2020-07-31 VITALS
DIASTOLIC BLOOD PRESSURE: 78 MMHG | HEART RATE: 67 BPM | BODY MASS INDEX: 30 KG/M2 | SYSTOLIC BLOOD PRESSURE: 113 MMHG | WEIGHT: 164 LBS

## 2020-07-31 DIAGNOSIS — E11.65 UNCONTROLLED TYPE 2 DIABETES MELLITUS WITH HYPERGLYCEMIA (HCC): Primary | ICD-10-CM

## 2020-07-31 LAB
CARTRIDGE LOT#: ABNORMAL NUMERIC
GLUCOSE BLOOD: 313
HEMOGLOBIN A1C: 11.7 % (ref 4.3–5.6)
TEST STRIP LOT #: NORMAL NUMERIC

## 2020-07-31 PROCEDURE — 36416 COLLJ CAPILLARY BLOOD SPEC: CPT | Performed by: INTERNAL MEDICINE

## 2020-07-31 PROCEDURE — 82962 GLUCOSE BLOOD TEST: CPT | Performed by: INTERNAL MEDICINE

## 2020-07-31 PROCEDURE — 3074F SYST BP LT 130 MM HG: CPT | Performed by: INTERNAL MEDICINE

## 2020-07-31 PROCEDURE — 3078F DIAST BP <80 MM HG: CPT | Performed by: INTERNAL MEDICINE

## 2020-07-31 PROCEDURE — 83036 HEMOGLOBIN GLYCOSYLATED A1C: CPT | Performed by: INTERNAL MEDICINE

## 2020-07-31 PROCEDURE — 99204 OFFICE O/P NEW MOD 45 MIN: CPT | Performed by: INTERNAL MEDICINE

## 2020-07-31 RX ORDER — ATORVASTATIN CALCIUM 20 MG/1
20 TABLET, FILM COATED ORAL NIGHTLY
Qty: 90 TABLET | Refills: 1 | Status: SHIPPED | OUTPATIENT
Start: 2020-07-31 | End: 2020-10-30

## 2020-07-31 RX ORDER — GLIPIZIDE 10 MG/1
10 TABLET ORAL
Qty: 180 TABLET | Refills: 1 | Status: SHIPPED | OUTPATIENT
Start: 2020-07-31 | End: 2020-10-30

## 2020-07-31 NOTE — TELEPHONE ENCOUNTER
Canagliflozin (INVOKANA) 300 MG Oral Tab, Take 300 mg by mouth every morning before breakfast., Disp: 30 tablet, Rfl: 3    Pharmacy is asking for an alternative. Insurance prefers [place] or [place].  Please consider an alternative or call insurance for

## 2020-07-31 NOTE — PROGRESS NOTES
Name: Charmaine Bruner  Date: 7/31/2020    Referring Physician: No ref. provider found    HISTORY OF PRESENT ILLNESS   Charmaine Bruner is a 28year old female who presents for diabetes mellitus, diagnosed 7 years ago.       She had gestational diabetes wit ONETOUCH LANCETS Does not apply Misc, Check blood sugar levels twice a day as instructed., Disp: 300 each, Rfl: 3     Allergies:   No Known Allergies    Social History:   Social History    Socioeconomic History      Marital status:       Spouse name glycemic control to prevent complications of diabetes  -Discussed complications of diabetes include retinopathy, neuropathy, nephropathy and cardiovascular disease  -Discussed ABCs of DM  -Discussed importance of SBGM  -Discussed importance of low CHO diet

## 2020-08-11 ENCOUNTER — APPOINTMENT (OUTPATIENT)
Dept: ENDOCRINOLOGY | Facility: HOSPITAL | Age: 35
End: 2020-08-11
Attending: INTERNAL MEDICINE
Payer: MEDICAID

## 2020-08-24 RX ORDER — SITAGLIPTIN 100 MG/1
TABLET, FILM COATED ORAL
Qty: 30 TABLET | Refills: 0 | Status: SHIPPED | OUTPATIENT
Start: 2020-08-24 | End: 2020-09-23

## 2020-09-05 ENCOUNTER — APPOINTMENT (OUTPATIENT)
Dept: ENDOCRINOLOGY | Facility: HOSPITAL | Age: 35
End: 2020-09-05
Attending: INTERNAL MEDICINE
Payer: MEDICAID

## 2020-09-12 ENCOUNTER — OFFICE VISIT (OUTPATIENT)
Dept: INTERNAL MEDICINE CLINIC | Facility: CLINIC | Age: 35
End: 2020-09-12
Payer: COMMERCIAL

## 2020-09-12 VITALS
HEIGHT: 62 IN | WEIGHT: 163 LBS | DIASTOLIC BLOOD PRESSURE: 66 MMHG | BODY MASS INDEX: 30 KG/M2 | HEART RATE: 77 BPM | SYSTOLIC BLOOD PRESSURE: 99 MMHG | RESPIRATION RATE: 16 BRPM

## 2020-09-12 DIAGNOSIS — N87.0 CERVICAL DYSPLASIA, MILD: ICD-10-CM

## 2020-09-12 DIAGNOSIS — Z12.4 SCREENING FOR CERVICAL CANCER: Primary | ICD-10-CM

## 2020-09-12 DIAGNOSIS — E11.65 TYPE 2 DIABETES MELLITUS WITH HYPERGLYCEMIA, WITHOUT LONG-TERM CURRENT USE OF INSULIN (HCC): ICD-10-CM

## 2020-09-12 DIAGNOSIS — N63.0 BREAST LUMP: ICD-10-CM

## 2020-09-12 DIAGNOSIS — E13.9 DIABETES 1.5, MANAGED AS TYPE 2 (HCC): ICD-10-CM

## 2020-09-12 PROCEDURE — 90471 IMMUNIZATION ADMIN: CPT | Performed by: NURSE PRACTITIONER

## 2020-09-12 PROCEDURE — 99213 OFFICE O/P EST LOW 20 MIN: CPT | Performed by: NURSE PRACTITIONER

## 2020-09-12 PROCEDURE — 99395 PREV VISIT EST AGE 18-39: CPT | Performed by: NURSE PRACTITIONER

## 2020-09-12 PROCEDURE — 3008F BODY MASS INDEX DOCD: CPT | Performed by: NURSE PRACTITIONER

## 2020-09-12 PROCEDURE — 90715 TDAP VACCINE 7 YRS/> IM: CPT | Performed by: NURSE PRACTITIONER

## 2020-09-12 PROCEDURE — 3078F DIAST BP <80 MM HG: CPT | Performed by: NURSE PRACTITIONER

## 2020-09-12 PROCEDURE — 3074F SYST BP LT 130 MM HG: CPT | Performed by: NURSE PRACTITIONER

## 2020-09-12 NOTE — PROGRESS NOTES
HPI:    Patient ID: Jeovany Hale is a 28year old female. LMP August 25th. Patient is not using birth control. Using  Condoms. Patient has three children. 14, 12, and 6.     Immunization History  Administered            Date(s) Administered    DTP shortness of breath. Cardiovascular: Negative for chest pain, palpitations and leg swelling. Gastrointestinal: Negative for nausea, vomiting, abdominal pain, diarrhea and constipation. Endocrine: Negative for cold intolerance and heat intolerance. heart sounds and intact distal pulses. Exam reveals no gallop and no friction rub. No murmur heard. Pulmonary/Chest: Effort normal and breath sounds normal. She has no wheezes. She has no rales.  Breasts exhibits palpable mass (Right breast mass at 6 P diabetic with a hemoglobin of 11.9. I sent her to Dr. Osborn Babinski and she has been more compliant with her medication. He is on Januvia and glipizide. She stopped testing her blood sugars because she ran out of her diabetes supplies.   I put an order in but th requested or ordered in this encounter       Imaging & Referrals:  [facility] - INTERNAL  TETANUS, DIPHTHERIA TOXOIDS AND ACELLULAR PERTUSIS VACCINE (TDAP), >7 YEARS, IM USE  DME DIABETIC TEST STRIPS AND SUPPLIES  [facility] [place] DIABETIC TEST STRIPS AND SUPPLIES  [place] [id number]

## 2020-09-12 NOTE — ASSESSMENT & PLAN NOTE
28-year-old female who is noncompliant diabetic with a hemoglobin of 11.9. I sent her to Dr. Luis Antonio Martinez and she has been more compliant with her medication. He is on Januvia and glipizide.   She stopped testing her blood sugars because she ran out of her diabe

## 2020-09-12 NOTE — ASSESSMENT & PLAN NOTE
A/P and breast exam today fixed nonmovable breast lump at the 6 o'clock position on her right breast.    Plan  1) Follow-up with mammogram and GYN appointment.

## 2020-09-12 NOTE — ASSESSMENT & PLAN NOTE
A/P-pelvic exam today and cervical erosion noted at the 12 o'clock position of the cervix. Pap smear collected and HPV. Discussed abnormal finding with patient and will send to gynecology. Plan  1) Thin prep Pap smear and HPV completed.   2) Will send

## 2020-09-14 LAB — HPV I/H RISK 1 DNA SPEC QL NAA+PROBE: POSITIVE

## 2020-09-16 ENCOUNTER — TELEPHONE (OUTPATIENT)
Dept: INTERNAL MEDICINE CLINIC | Facility: CLINIC | Age: 35
End: 2020-09-16

## 2020-09-16 LAB
HPV16 DNA CVX QL PROBE+SIG AMP: NEGATIVE
HPV18 DNA CVX QL PROBE+SIG AMP: NEGATIVE

## 2020-09-16 NOTE — TELEPHONE ENCOUNTER
Mauricio Irvin    Follow-up on patient receiving her diabetes supplies. She asked that I send the order to a different pharmacy and he will say because it might be cheaper. Patient given results of her HPV being positive.   Patient was told to follow-up

## 2020-09-23 RX ORDER — SITAGLIPTIN 100 MG/1
TABLET, FILM COATED ORAL
Qty: 30 TABLET | Refills: 2 | Status: SHIPPED | OUTPATIENT
Start: 2020-09-23 | End: 2020-10-30

## 2020-09-29 ENCOUNTER — OFFICE VISIT (OUTPATIENT)
Dept: OBGYN CLINIC | Facility: CLINIC | Age: 35
End: 2020-09-29
Payer: COMMERCIAL

## 2020-09-29 VITALS
HEART RATE: 76 BPM | SYSTOLIC BLOOD PRESSURE: 107 MMHG | DIASTOLIC BLOOD PRESSURE: 72 MMHG | BODY MASS INDEX: 30 KG/M2 | WEIGHT: 162.19 LBS

## 2020-09-29 DIAGNOSIS — Z01.419 VISIT FOR PELVIC EXAM: Primary | ICD-10-CM

## 2020-09-29 PROCEDURE — 99203 OFFICE O/P NEW LOW 30 MIN: CPT | Performed by: OBSTETRICS & GYNECOLOGY

## 2020-09-29 PROCEDURE — 3078F DIAST BP <80 MM HG: CPT | Performed by: OBSTETRICS & GYNECOLOGY

## 2020-09-29 PROCEDURE — 3074F SYST BP LT 130 MM HG: CPT | Performed by: OBSTETRICS & GYNECOLOGY

## 2020-10-06 ENCOUNTER — HOSPITAL ENCOUNTER (OUTPATIENT)
Dept: MAMMOGRAPHY | Age: 35
Discharge: HOME | End: 2020-10-06
Attending: NURSE PRACTITIONER
Payer: COMMERCIAL

## 2020-10-06 DIAGNOSIS — N63.0 BREAST LUMP: ICD-10-CM

## 2020-10-11 NOTE — H&P
HPI:  The patient is a 27 yo F here for pelvic exam at recs of PCP. Had pelvic recently and concern for erosion seen on cervix. No pain. No abn menses. +IC. No PCB.   LPS 9/12/20 pap neg/hpv+      Reviewed medical and surgical history below       OBSTE ex partner: Not on file        Emotionally abused: Not on file        Physically abused: Not on file        Forced sexual activity: Not on file    Other Topics      Concerns:        Caffeine Concern: No        Exercise: Yes        Seat Belt: Yes        Spe weakness  Psychiatric: denies depression or anxiety.        09/29/20  1116   BP: 107/72   Pulse: 76       PHYSICAL EXAM:   Constitutional: well developed, well nourished  Head/Face: normocephalic  Psychiatric: Appropriate mood and affect    Pelvic Exam:  Ex

## 2020-10-28 ENCOUNTER — LAB ENCOUNTER (OUTPATIENT)
Dept: LAB | Facility: HOSPITAL | Age: 35
End: 2020-10-28
Attending: NURSE PRACTITIONER
Payer: MEDICAID

## 2020-10-28 DIAGNOSIS — E13.9 DIABETES 1.5, MANAGED AS TYPE 2 (HCC): ICD-10-CM

## 2020-10-28 PROCEDURE — 80053 COMPREHEN METABOLIC PANEL: CPT

## 2020-10-28 PROCEDURE — 80061 LIPID PANEL: CPT

## 2020-10-28 PROCEDURE — 83036 HEMOGLOBIN GLYCOSYLATED A1C: CPT

## 2020-10-28 PROCEDURE — 36415 COLL VENOUS BLD VENIPUNCTURE: CPT

## 2020-10-30 ENCOUNTER — OFFICE VISIT (OUTPATIENT)
Dept: ENDOCRINOLOGY CLINIC | Facility: CLINIC | Age: 35
End: 2020-10-30
Payer: MEDICAID

## 2020-10-30 VITALS — WEIGHT: 163 LBS | BODY MASS INDEX: 30 KG/M2

## 2020-10-30 DIAGNOSIS — E11.65 UNCONTROLLED TYPE 2 DIABETES MELLITUS WITH HYPERGLYCEMIA (HCC): Primary | ICD-10-CM

## 2020-10-30 PROCEDURE — 82947 ASSAY GLUCOSE BLOOD QUANT: CPT | Performed by: INTERNAL MEDICINE

## 2020-10-30 PROCEDURE — 99214 OFFICE O/P EST MOD 30 MIN: CPT | Performed by: INTERNAL MEDICINE

## 2020-10-30 PROCEDURE — 36416 COLLJ CAPILLARY BLOOD SPEC: CPT | Performed by: INTERNAL MEDICINE

## 2020-10-30 RX ORDER — CANAGLIFLOZIN 300 MG/1
300 TABLET, FILM COATED ORAL
Qty: 90 TABLET | Refills: 1 | Status: SHIPPED | OUTPATIENT
Start: 2020-10-30 | End: 2020-11-29

## 2020-10-30 RX ORDER — LIRAGLUTIDE 6 MG/ML
1.2 INJECTION SUBCUTANEOUS DAILY
Qty: 18 ML | Refills: 1 | Status: SHIPPED | OUTPATIENT
Start: 2020-10-30 | End: 2021-05-05

## 2020-10-30 RX ORDER — PEN NEEDLE, DIABETIC 32GX 5/32"
NEEDLE, DISPOSABLE MISCELLANEOUS
Qty: 100 EACH | Refills: 1 | Status: SHIPPED | OUTPATIENT
Start: 2020-10-30 | End: 2021-05-05

## 2020-10-30 RX ORDER — GLIPIZIDE 10 MG/1
10 TABLET ORAL
Qty: 180 TABLET | Refills: 1 | Status: SHIPPED | OUTPATIENT
Start: 2020-10-30 | End: 2021-05-05

## 2020-10-30 RX ORDER — ATORVASTATIN CALCIUM 20 MG/1
20 TABLET, FILM COATED ORAL NIGHTLY
Qty: 90 TABLET | Refills: 1 | Status: SHIPPED | OUTPATIENT
Start: 2020-10-30

## 2020-10-30 NOTE — PATIENT INSTRUCTIONS
CONTINUE Glipizide 10mg 2 times per day    STOP Januvia    START Victoza 0.6mg SQ daily for one week then increase to 1.2mg SQ daily    START Invokana 300mg daily

## 2020-10-30 NOTE — PROGRESS NOTES
Name: Velia Evans  Date: 10/30/2020    Referring Physician: No ref. provider found    HISTORY OF PRESENT ILLNESS   Velia Evans is a 28year old female who presents for diabetes mellitus, diagnosed 7 years ago.       She had gestational diabetes wi Known Allergies    Social History:   Social History    Socioeconomic History      Marital status:       Spouse name: Not on file      Number of children: Not on file      Years of education: Not on file      Highest education level: Not on file    T of diabetes include retinopathy, neuropathy, nephropathy and cardiovascular disease  -Discussed ABCs of DM  -Discussed importance of SBGM  -Discussed importance of low CHO diet, recommend 45gm per meal or 135gm per day  -Provided patient education material

## 2021-04-22 ENCOUNTER — NURSE TRIAGE (OUTPATIENT)
Dept: INTERNAL MEDICINE CLINIC | Facility: CLINIC | Age: 36
End: 2021-04-22

## 2021-04-22 ENCOUNTER — OFFICE VISIT (OUTPATIENT)
Dept: INTERNAL MEDICINE CLINIC | Facility: CLINIC | Age: 36
End: 2021-04-22

## 2021-04-22 DIAGNOSIS — G44.59 OTHER COMPLICATED HEADACHE SYNDROME: ICD-10-CM

## 2021-04-22 DIAGNOSIS — R51.9 CHRONIC INTRACTABLE HEADACHE, UNSPECIFIED HEADACHE TYPE: Primary | ICD-10-CM

## 2021-04-22 DIAGNOSIS — E11.65 TYPE 2 DIABETES MELLITUS WITH HYPERGLYCEMIA, WITHOUT LONG-TERM CURRENT USE OF INSULIN (HCC): ICD-10-CM

## 2021-04-22 DIAGNOSIS — G89.29 CHRONIC INTRACTABLE HEADACHE, UNSPECIFIED HEADACHE TYPE: Primary | ICD-10-CM

## 2021-04-22 PROCEDURE — 99214 OFFICE O/P EST MOD 30 MIN: CPT | Performed by: NURSE PRACTITIONER

## 2021-04-22 NOTE — PROGRESS NOTES
HPI:    Patient ID: Harleen Ruiz is a 39year old female. Please note that the following visit was completed using two-way, real-time interactive audio and video communication.   This has been done in [place] to provide continuity of care in the b head.    Plan  1) CT of Head  2) Hemoglobin A1C  3) CBC, CMP, lipid panel, microalbumin      Past Medical History:   Diagnosis Date   • Anxiety    • Diabetes ([facility] [address]    • No diabetic retinopathy in both eyes 03/29/2018      Past Surgical History:   Procedure (Trouble falling asleep). Negative for dysphoric mood. The patient is not nervous/anxious.                Current Outpatient Medications   Medication Sig Dispense Refill   • glipiZIDE 10 MG Oral Tab Take 1 tablet (10 mg total) by mouth 2 (two) times daily b head.    Plan  1) CT of Head  2) Hemoglobin A1C  3) CBC, CMP, lipid panel, microalbumin           Other Visit Diagnoses     Chronic intractable headache, unspecified headache type    -  Primary    Relevant Orders    CT BRAIN OR HEAD (77150)    CBC WITH DIF

## 2021-04-22 NOTE — ASSESSMENT & PLAN NOTE
A/P Poorly controlled diabetic with hemoglobin A1c over 10. Appointment was made with Dr. Jerzy Henley and she was started on insulin. She is due for hemoglobin A1c and micral albumin level. She made an appointment with Dr. Benny Ferreira for June.   Since she is having

## 2021-04-22 NOTE — ASSESSMENT & PLAN NOTE
A/P 26-year-old female who was poorly controlled diabetes and went to Dr. Meryle Fines and now is on insulin Victoza and glipizide 10 mg daily. Is also taking the Invokona and that was causing her to be nauseated all day.   Patient states her glucose fluctuates f

## 2021-04-29 ENCOUNTER — LAB ENCOUNTER (OUTPATIENT)
Dept: LAB | Facility: HOSPITAL | Age: 36
End: 2021-04-29
Attending: NURSE PRACTITIONER
Payer: MEDICAID

## 2021-04-29 ENCOUNTER — TELEPHONE (OUTPATIENT)
Dept: ENDOCRINOLOGY CLINIC | Facility: CLINIC | Age: 36
End: 2021-04-29

## 2021-04-29 DIAGNOSIS — G89.29 CHRONIC INTRACTABLE HEADACHE, UNSPECIFIED HEADACHE TYPE: ICD-10-CM

## 2021-04-29 DIAGNOSIS — R51.9 CHRONIC INTRACTABLE HEADACHE, UNSPECIFIED HEADACHE TYPE: ICD-10-CM

## 2021-04-29 PROCEDURE — 80061 LIPID PANEL: CPT

## 2021-04-29 PROCEDURE — 82043 UR ALBUMIN QUANTITATIVE: CPT

## 2021-04-29 PROCEDURE — 83036 HEMOGLOBIN GLYCOSYLATED A1C: CPT

## 2021-04-29 PROCEDURE — 36415 COLL VENOUS BLD VENIPUNCTURE: CPT

## 2021-04-29 PROCEDURE — 82570 ASSAY OF URINE CREATININE: CPT

## 2021-04-29 PROCEDURE — 80053 COMPREHEN METABOLIC PANEL: CPT

## 2021-04-29 PROCEDURE — 85025 COMPLETE CBC W/AUTO DIFF WBC: CPT

## 2021-04-29 NOTE — TELEPHONE ENCOUNTER
Patient has questions regarding rx glipzide 10 MG, rx Victoza, rx Invokana and test strips, lancets. Please call at [phone],DIVINE.

## 2021-05-04 NOTE — TELEPHONE ENCOUNTER
Spoke with pt, pt informed me that she is not taking Invokana 300mg daily due to previous symptoms of dry mouth and nausea    Pt has appt on 8/3 and needs refills for Glipizide and Victoza---pended scripts for you to review. Well Child Office Visit  Date: 10/14/2019  PCP: Kayy Harris MD    Fatimah Griffin is a 11 year old male who presents for 11 yr old well child exam.  Patient presents with Mother.    Concerns raised today include: Anxiety and behavioral issues, somewhat oppositional.     SOCIAL:  Primary caretakers:  Mom  Siblings: Older sister  Smoke exposure: none  Pets: Yes, 2 cats  School: [employer] 5th grade  Concerns for school performance: none  Concerns for behavior: none, mom is concerned for autism. He is very particular about certain things, frustrated about school. School wants him to go to speech therapy for a lisp. He is somewhat defiant and argumentative.      Birth history, medical history, surgical history, and family history reviewed and updated.    REVIEW OF SYSTEMS:  He has throat pain in the morning.   No problems with urination, nausea, vomiting, troubles with urination.   No chest pain or ear pain.     PHYSICAL EXAM:  Visit Vitals  /60 (BP Location: LUE - Left upper extremity, Patient Position: Sitting, Cuff Size: Pediatric)   Pulse 62   Temp 98.3 °F (36.8 °C) (Oral)   Resp 16   Ht 4' 7.75\" (1.416 m)   Wt 28.9 kg   SpO2 98%   BMI 14.41 kg/m²      GEN:  Well appearing male, nontoxic, no acute distress.  Alert and interactive.  SKIN: Warm, normal turgor.  No cyanosis.  No bruises or lesions.  HEAD:  Normocephalic, atraumatic.    EYES:  Conjunctiva without injection or icterus.  PERRL, EOMI.  NOSE: No flaring, nasal mucosa with some blue pallor and inferior turbinate swelling.  EARS:  TMs transparent with good landmarks, external auditory canals are patent.  THROAT:  Oropharynx with moist mucus membranes and no lesions, mild mucoid cobblestoning.  NECK:  Supple, no cervical or supraclavicular lymphadenopathy or masses. Trachea midline, no thyromegaly.  HEART:  Regular rate and rhythm.  Normal S1, S2 without murmurs, rubs, gallops. No pretibial edema.  LUNGS:  Normal work of breathing.   Clear to auscultation bilaterally.  No wheezes, rales, rhonchi.    ABD:  Soft, nontender.  No hepatosplenomegaly or masses.  :  Deferred.  EXT:  Warm, dry, without abnormalities.  NEURO:  Normal tone, bulk, strength. Patellar deep tendon reflexes are 1+ and symmetric, CN II-XII normal.     ASSESSMENT:  11 year old male well child.  1. Encounter for routine child health examination without abnormal findings    2. Allergic rhinitis, unspecified seasonality, unspecified trigger    3. Behavioral and emotional disorders with onset usually occurring in childhood and adolescence        PLAN:  Patient exhibits oppositional behaviors and is quite argumentative. Mom would like to have him evaluated by psych/ obtain a therapist, and patient also desires this. A referral has been placed to Behavioral Health. Mom advised of long wait times through [facility] and alternative outside agency focused on children and adolescents. Bussiness card provided to mom.     All parental concerns and questions discussed.    Anticipatory guidance provided, handout given.              Safety/car/water              Development              Discipline              Diet              Sun exposure              Tobacco-free home              Dental care                Immunizations are not up to date, patient was scheduled to get HPV, Influenza, Meningococcal and Tdap. A considerable amount of time was spent between the MA, RN/ NP Student and mother trying to convince patient to accept the immunizations. He became distressed and combative so the decision was made in order to avoid injury to forgo immunization administration.     FOLLOW UP:  In 1 year for 12 year [facility] or sooner if needed.      Kayy Harris MD   10/14/2019  5:10 PM

## 2021-05-05 RX ORDER — PEN NEEDLE, DIABETIC 32GX 5/32"
NEEDLE, DISPOSABLE MISCELLANEOUS
Qty: 100 EACH | Refills: 1 | Status: SHIPPED | OUTPATIENT
Start: 2021-05-05

## 2021-05-05 RX ORDER — GLIPIZIDE 10 MG/1
10 TABLET ORAL
Qty: 180 TABLET | Refills: 1 | Status: SHIPPED | OUTPATIENT
Start: 2021-05-05

## 2021-05-05 RX ORDER — LIRAGLUTIDE 6 MG/ML
1.2 INJECTION SUBCUTANEOUS DAILY
Qty: 18 ML | Refills: 1 | Status: SHIPPED | OUTPATIENT
Start: 2021-05-05

## 2022-04-07 ENCOUNTER — HOSPITAL ENCOUNTER (EMERGENCY)
Facility: HOSPITAL | Age: 37
Discharge: HOME | End: 2022-04-07
Attending: EMERGENCY MEDICINE
Payer: MEDICAID

## 2022-04-07 VITALS
SYSTOLIC BLOOD PRESSURE: 104 MMHG | RESPIRATION RATE: 18 BRPM | WEIGHT: 143 LBS | OXYGEN SATURATION: 94 % | BODY MASS INDEX: 25.34 KG/M2 | HEIGHT: 63 IN | DIASTOLIC BLOOD PRESSURE: 64 MMHG | HEART RATE: 63 BPM

## 2022-04-07 DIAGNOSIS — R10.9 ACUTE RIGHT FLANK PAIN: Primary | ICD-10-CM

## 2022-04-07 LAB
BILIRUB UR QL: NEGATIVE
CLARITY UR: CLEAR
COLOR UR: YELLOW
GLUCOSE BLDC GLUCOMTR-MCNC: 206 MG/DL (ref 70–99)
GLUCOSE UR-MCNC: >=500 MG/DL
HGB UR QL STRIP.AUTO: NEGATIVE
KETONES UR-MCNC: NEGATIVE MG/DL
NITRITE UR QL STRIP.AUTO: NEGATIVE
PH UR: 6 [PH] (ref 5–8)
PROT UR-MCNC: NEGATIVE MG/DL
SP GR UR STRIP: 1.02 (ref 1–1.03)
UROBILINOGEN UR STRIP-ACNC: <2
VIT C UR-MCNC: NEGATIVE MG/DL

## 2022-04-07 PROCEDURE — 81001 URINALYSIS AUTO W/SCOPE: CPT | Performed by: EMERGENCY MEDICINE

## 2022-04-07 PROCEDURE — 82962 GLUCOSE BLOOD TEST: CPT

## 2022-04-07 PROCEDURE — 87086 URINE CULTURE/COLONY COUNT: CPT | Performed by: EMERGENCY MEDICINE

## 2022-04-07 PROCEDURE — 99283 EMERGENCY DEPT VISIT LOW MDM: CPT

## 2022-04-08 NOTE — ED INITIAL ASSESSMENT (HPI)
C/o R flank pain wrapping around to R upper abd since Monday. Pt also reports being diabetic and sts she has not taken any of her meds for \"months\".

## 2022-06-02 ENCOUNTER — NURSE TRIAGE (OUTPATIENT)
Dept: INTERNAL MEDICINE CLINIC | Facility: CLINIC | Age: 37
End: 2022-06-02

## 2022-06-02 ENCOUNTER — PATIENT MESSAGE (OUTPATIENT)
Dept: INTERNAL MEDICINE CLINIC | Facility: CLINIC | Age: 37
End: 2022-06-02

## 2022-06-02 NOTE — TELEPHONE ENCOUNTER
From: Nara Hart  To: JOSELIN Ledesma  Sent: 6/2/2022 11:53 AM CDT  Subject: Prescription     Hi Ron Mackay. This is Nara Hart. Is there any way you can send over to my [facility] a prescription for a yeast infection?

## 2022-06-02 NOTE — TELEPHONE ENCOUNTER
----- Message from Virginia Hood RN sent at 6/2/2022  4:29 PM CDT -----  Regarding: FW: Prescription       ----- Message -----  From: Gertie Rubinstein  Sent: 6/2/2022  11:53 AM CDT  To: [facility] R[facility] Triage  Subject: Prescription                                     Marlon Nam. This is Gertie Rubinstein. Is there any way you can [facility] for a yeast infection?

## 2022-06-13 ENCOUNTER — OFFICE VISIT (OUTPATIENT)
Dept: ENDOCRINOLOGY CLINIC | Facility: CLINIC | Age: 37
End: 2022-06-13
Payer: MEDICAID

## 2022-06-13 ENCOUNTER — LAB ENCOUNTER (OUTPATIENT)
Dept: LAB | Facility: HOSPITAL | Age: 37
End: 2022-06-13
Attending: INTERNAL MEDICINE
Payer: MEDICAID

## 2022-06-13 VITALS
DIASTOLIC BLOOD PRESSURE: 77 MMHG | BODY MASS INDEX: 26 KG/M2 | WEIGHT: 144 LBS | HEART RATE: 74 BPM | SYSTOLIC BLOOD PRESSURE: 109 MMHG

## 2022-06-13 DIAGNOSIS — E11.65 UNCONTROLLED TYPE 2 DIABETES MELLITUS WITH HYPERGLYCEMIA (HCC): Primary | ICD-10-CM

## 2022-06-13 LAB
ALBUMIN SERPL-MCNC: 3.3 G/DL (ref 3.4–5)
ALBUMIN/GLOB SERPL: 0.9 {RATIO} (ref 1–2)
ALP LIVER SERPL-CCNC: 66 U/L
ALT SERPL-CCNC: 23 U/L
ANION GAP SERPL CALC-SCNC: 5 MMOL/L (ref 0–18)
AST SERPL-CCNC: 21 U/L (ref 15–37)
BILIRUB SERPL-MCNC: 0.2 MG/DL (ref 0.1–2)
BUN BLD-MCNC: 14 MG/DL (ref 7–18)
BUN/CREAT SERPL: 22.6 (ref 10–20)
CALCIUM BLD-MCNC: 8.7 MG/DL (ref 8.5–10.1)
CARTRIDGE LOT#: ABNORMAL NUMERIC
CHLORIDE SERPL-SCNC: 101 MMOL/L (ref 98–112)
CHOLEST SERPL-MCNC: 165 MG/DL (ref ?–200)
CO2 SERPL-SCNC: 30 MMOL/L (ref 21–32)
CREAT BLD-MCNC: 0.62 MG/DL
CREAT UR-SCNC: 104 MG/DL
DEPRECATED RDW RBC AUTO: 40.5 FL (ref 35.1–46.3)
ERYTHROCYTE [DISTWIDTH] IN BLOOD BY AUTOMATED COUNT: 12.9 % (ref 11–15)
FASTING PATIENT LIPID ANSWER: NO
FASTING STATUS PATIENT QL REPORTED: NO
GLOBULIN PLAS-MCNC: 3.7 G/DL (ref 2.8–4.4)
GLUCOSE BLD-MCNC: 185 MG/DL (ref 70–99)
GLUCOSE BLOOD: 184
HCT VFR BLD AUTO: 41.1 %
HDLC SERPL-MCNC: 28 MG/DL (ref 40–59)
HEMOGLOBIN A1C: 10 % (ref 4.3–5.6)
HGB BLD-MCNC: 12.9 G/DL
LDLC SERPL CALC-MCNC: 88 MG/DL (ref ?–100)
MCH RBC QN AUTO: 26.9 PG (ref 26–34)
MCHC RBC AUTO-ENTMCNC: 31.4 G/DL (ref 31–37)
MCV RBC AUTO: 85.6 FL
MICROALBUMIN UR-MCNC: 0.65 MG/DL
MICROALBUMIN/CREAT 24H UR-RTO: 6.3 UG/MG (ref ?–30)
NONHDLC SERPL-MCNC: 137 MG/DL (ref ?–130)
OSMOLALITY SERPL CALC.SUM OF ELEC: 287 MOSM/KG (ref 275–295)
PLATELET # BLD AUTO: 343 10(3)UL (ref 150–450)
POTASSIUM SERPL-SCNC: 3.8 MMOL/L (ref 3.5–5.1)
PROT SERPL-MCNC: 7 G/DL (ref 6.4–8.2)
RBC # BLD AUTO: 4.8 X10(6)UL
SODIUM SERPL-SCNC: 136 MMOL/L (ref 136–145)
TEST STRIP LOT #: NORMAL NUMERIC
TRIGL SERPL-MCNC: 291 MG/DL (ref 30–149)
TSI SER-ACNC: 0.42 MIU/ML (ref 0.36–3.74)
VLDLC SERPL CALC-MCNC: 47 MG/DL (ref 0–30)
WBC # BLD AUTO: 10.8 X10(3) UL (ref 4–11)

## 2022-06-13 PROCEDURE — 84443 ASSAY THYROID STIM HORMONE: CPT | Performed by: INTERNAL MEDICINE

## 2022-06-13 PROCEDURE — 82570 ASSAY OF URINE CREATININE: CPT | Performed by: INTERNAL MEDICINE

## 2022-06-13 PROCEDURE — 83036 HEMOGLOBIN GLYCOSYLATED A1C: CPT | Performed by: INTERNAL MEDICINE

## 2022-06-13 PROCEDURE — 82043 UR ALBUMIN QUANTITATIVE: CPT | Performed by: INTERNAL MEDICINE

## 2022-06-13 PROCEDURE — 99214 OFFICE O/P EST MOD 30 MIN: CPT | Performed by: INTERNAL MEDICINE

## 2022-06-13 PROCEDURE — 3078F DIAST BP <80 MM HG: CPT | Performed by: INTERNAL MEDICINE

## 2022-06-13 PROCEDURE — 82947 ASSAY GLUCOSE BLOOD QUANT: CPT | Performed by: INTERNAL MEDICINE

## 2022-06-13 PROCEDURE — 3061F NEG MICROALBUMINURIA REV: CPT | Performed by: INTERNAL MEDICINE

## 2022-06-13 PROCEDURE — 3074F SYST BP LT 130 MM HG: CPT | Performed by: INTERNAL MEDICINE

## 2022-06-13 PROCEDURE — 80053 COMPREHEN METABOLIC PANEL: CPT | Performed by: INTERNAL MEDICINE

## 2022-06-13 PROCEDURE — 3046F HEMOGLOBIN A1C LEVEL >9.0%: CPT | Performed by: INTERNAL MEDICINE

## 2022-06-13 PROCEDURE — 36415 COLL VENOUS BLD VENIPUNCTURE: CPT | Performed by: INTERNAL MEDICINE

## 2022-06-13 PROCEDURE — 85027 COMPLETE CBC AUTOMATED: CPT | Performed by: INTERNAL MEDICINE

## 2022-06-13 PROCEDURE — 80061 LIPID PANEL: CPT

## 2022-06-13 RX ORDER — BLOOD SUGAR DIAGNOSTIC
STRIP MISCELLANEOUS
Qty: 100 STRIP | Refills: 1 | Status: SHIPPED | OUTPATIENT
Start: 2022-06-13

## 2022-06-13 RX ORDER — BLOOD-GLUCOSE METER
1 EACH MISCELLANEOUS DAILY
Qty: 1 KIT | Refills: 0 | Status: SHIPPED | OUTPATIENT
Start: 2022-06-13

## 2022-06-13 RX ORDER — GLIMEPIRIDE 2 MG/1
2 TABLET ORAL
Qty: 90 TABLET | Refills: 1 | Status: SHIPPED | OUTPATIENT
Start: 2022-06-13

## 2022-06-13 RX ORDER — DULAGLUTIDE 1.5 MG/.5ML
1.5 INJECTION, SOLUTION SUBCUTANEOUS WEEKLY
Qty: 12 EACH | Refills: 1 | Status: SHIPPED | OUTPATIENT
Start: 2022-07-13

## 2022-06-13 RX ORDER — DULAGLUTIDE 0.75 MG/.5ML
0.75 INJECTION, SOLUTION SUBCUTANEOUS WEEKLY
Qty: 4 EACH | Refills: 0 | Status: SHIPPED | OUTPATIENT
Start: 2022-06-13

## 2022-06-13 RX ORDER — LANCETS 33 GAUGE
1 EACH MISCELLANEOUS DAILY
Qty: 100 EACH | Refills: 1 | Status: SHIPPED | OUTPATIENT
Start: 2022-06-13

## 2022-06-13 NOTE — PATIENT INSTRUCTIONS
START Glimepiride 2mg daily    START Trulicity 5.97QE subcutaneous weekly for one month then increase to 1.5mg subcutaneous weekly

## 2022-07-19 ENCOUNTER — PATIENT MESSAGE (OUTPATIENT)
Dept: ENDOCRINOLOGY CLINIC | Facility: CLINIC | Age: 37
End: 2022-07-19

## 2022-07-19 NOTE — TELEPHONE ENCOUNTER
Hi!  Please ask patient if she has fever, abdominal pain, diarrhea and please ask her to contact PCP as well. She can hold medication of course, but Dr. Laurie John will come back before then to give further recommendations. Thank you!

## 2022-07-19 NOTE — TELEPHONE ENCOUNTER
Hi!  Please find out when she started medication and what other medications that is also on for diabetes. Please also find out what her diet has been like when she is not vomiting. Thank you!

## 2022-07-20 RX ORDER — ONDANSETRON 4 MG/1
4 TABLET, ORALLY DISINTEGRATING ORAL EVERY 8 HOURS PRN
Qty: 12 TABLET | Refills: 0 | Status: SHIPPED | OUTPATIENT
Start: 2022-07-20

## 2022-07-20 NOTE — TELEPHONE ENCOUNTER
Ok to give Zofran 4mg PO Q8 PRN #12 to help with nausea. Stop the Trulicity. Schedule visit with CDE to  discuss alternatives. Thanks.

## 2022-08-29 ENCOUNTER — TELEPHONE (OUTPATIENT)
Dept: ENDOCRINOLOGY CLINIC | Facility: CLINIC | Age: 37
End: 2022-08-29

## 2022-08-29 NOTE — TELEPHONE ENCOUNTER
Thais Pradhan states patient is trying to register for labs but there are no orders. Please call. Thank you.

## 2022-08-29 NOTE — TELEPHONE ENCOUNTER
Per Dr. Cooper Cardoza, patient is not due for labs as they were last done 6/13/22. Notified patient.

## 2022-09-02 ENCOUNTER — OFFICE VISIT (OUTPATIENT)
Dept: ENDOCRINOLOGY CLINIC | Facility: CLINIC | Age: 37
End: 2022-09-02
Payer: MEDICAID

## 2022-09-02 VITALS
WEIGHT: 141 LBS | HEART RATE: 61 BPM | DIASTOLIC BLOOD PRESSURE: 80 MMHG | SYSTOLIC BLOOD PRESSURE: 113 MMHG | BODY MASS INDEX: 25 KG/M2

## 2022-09-02 DIAGNOSIS — E11.65 UNCONTROLLED TYPE 2 DIABETES MELLITUS WITH HYPERGLYCEMIA (HCC): Primary | ICD-10-CM

## 2022-09-02 LAB
CARTRIDGE LOT#: ABNORMAL NUMERIC
GLUCOSE BLOOD: 221
HEMOGLOBIN A1C: 8.7 % (ref 4.3–5.6)
TEST STRIP LOT #: NORMAL NUMERIC

## 2022-09-02 PROCEDURE — 82947 ASSAY GLUCOSE BLOOD QUANT: CPT | Performed by: INTERNAL MEDICINE

## 2022-09-02 PROCEDURE — 3079F DIAST BP 80-89 MM HG: CPT | Performed by: INTERNAL MEDICINE

## 2022-09-02 PROCEDURE — 99214 OFFICE O/P EST MOD 30 MIN: CPT | Performed by: INTERNAL MEDICINE

## 2022-09-02 PROCEDURE — 3052F HG A1C>EQUAL 8.0%<EQUAL 9.0%: CPT | Performed by: INTERNAL MEDICINE

## 2022-09-02 PROCEDURE — 83036 HEMOGLOBIN GLYCOSYLATED A1C: CPT | Performed by: INTERNAL MEDICINE

## 2022-09-02 PROCEDURE — 3074F SYST BP LT 130 MM HG: CPT | Performed by: INTERNAL MEDICINE

## 2022-09-02 RX ORDER — FLUCONAZOLE 150 MG/1
150 TABLET ORAL WEEKLY
Qty: 4 TABLET | Refills: 0 | Status: SHIPPED | OUTPATIENT
Start: 2022-09-02

## 2022-09-19 ENCOUNTER — OFFICE VISIT (OUTPATIENT)
Dept: INTERNAL MEDICINE CLINIC | Facility: CLINIC | Age: 37
End: 2022-09-19

## 2022-09-19 VITALS
HEART RATE: 66 BPM | DIASTOLIC BLOOD PRESSURE: 62 MMHG | OXYGEN SATURATION: 98 % | SYSTOLIC BLOOD PRESSURE: 98 MMHG | BODY MASS INDEX: 25.36 KG/M2 | HEIGHT: 62 IN | WEIGHT: 137.81 LBS

## 2022-09-19 DIAGNOSIS — L65.9 ALOPECIA: ICD-10-CM

## 2022-09-19 DIAGNOSIS — L72.3 SEBACEOUS CYST: Primary | ICD-10-CM

## 2022-09-19 DIAGNOSIS — N76.0 VAGINAL INFECTION: ICD-10-CM

## 2022-09-19 DIAGNOSIS — E11.65 TYPE 2 DIABETES MELLITUS WITH HYPERGLYCEMIA, WITHOUT LONG-TERM CURRENT USE OF INSULIN (HCC): ICD-10-CM

## 2022-09-19 PROCEDURE — 3074F SYST BP LT 130 MM HG: CPT | Performed by: INTERNAL MEDICINE

## 2022-09-19 PROCEDURE — 3008F BODY MASS INDEX DOCD: CPT | Performed by: INTERNAL MEDICINE

## 2022-09-19 PROCEDURE — 3078F DIAST BP <80 MM HG: CPT | Performed by: INTERNAL MEDICINE

## 2022-09-19 PROCEDURE — 99204 OFFICE O/P NEW MOD 45 MIN: CPT | Performed by: INTERNAL MEDICINE

## 2022-09-22 ENCOUNTER — TELEPHONE (OUTPATIENT)
Dept: INTERNAL MEDICINE CLINIC | Facility: CLINIC | Age: 37
End: 2022-09-22

## 2022-09-22 NOTE — TELEPHONE ENCOUNTER
Patient is past due for her screening mammogram.  I will place the order again.     CAYETANO Sage  Working with Clancy Severe

## 2022-09-22 NOTE — TELEPHONE ENCOUNTER
Spoke, with the patient and informed her of the message below. Pt was also provided with the phone number for Central Scheduling.

## 2022-10-19 ENCOUNTER — OFFICE VISIT (OUTPATIENT)
Dept: INTERNAL MEDICINE CLINIC | Facility: CLINIC | Age: 37
End: 2022-10-19
Payer: MEDICAID

## 2022-10-19 VITALS
HEIGHT: 62 IN | OXYGEN SATURATION: 100 % | BODY MASS INDEX: 25.47 KG/M2 | SYSTOLIC BLOOD PRESSURE: 102 MMHG | DIASTOLIC BLOOD PRESSURE: 68 MMHG | TEMPERATURE: 97 F | WEIGHT: 138.38 LBS | HEART RATE: 61 BPM

## 2022-10-19 DIAGNOSIS — N76.0 ACUTE VAGINITIS: ICD-10-CM

## 2022-10-19 DIAGNOSIS — F41.9 ANXIETY: ICD-10-CM

## 2022-10-19 DIAGNOSIS — N63.0 MASS OF BREAST, UNSPECIFIED LATERALITY: ICD-10-CM

## 2022-10-19 DIAGNOSIS — E11.65 TYPE 2 DIABETES MELLITUS WITH HYPERGLYCEMIA, WITHOUT LONG-TERM CURRENT USE OF INSULIN (HCC): Primary | ICD-10-CM

## 2022-10-19 DIAGNOSIS — Z23 NEED FOR VACCINATION: ICD-10-CM

## 2022-10-19 PROBLEM — R79.89 ELEVATED LFTS: Status: RESOLVED | Noted: 2019-04-08 | Resolved: 2022-10-19

## 2022-10-19 PROBLEM — G44.59 OTHER COMPLICATED HEADACHE SYNDROME: Status: RESOLVED | Noted: 2021-04-22 | Resolved: 2022-10-19

## 2022-10-19 PROCEDURE — 3078F DIAST BP <80 MM HG: CPT | Performed by: INTERNAL MEDICINE

## 2022-10-19 PROCEDURE — 99214 OFFICE O/P EST MOD 30 MIN: CPT | Performed by: INTERNAL MEDICINE

## 2022-10-19 PROCEDURE — 3074F SYST BP LT 130 MM HG: CPT | Performed by: INTERNAL MEDICINE

## 2022-10-19 PROCEDURE — 3008F BODY MASS INDEX DOCD: CPT | Performed by: INTERNAL MEDICINE

## 2022-10-19 NOTE — ASSESSMENT & PLAN NOTE
a1c better - not checking sugars  As should  -  ams when dose 220 - in afternoon 170     Asked her  Why noncompliant - told me concern for meds not working in future  and side effects     I warned of complications  From high sugars       Needs to see optho

## 2022-10-31 ENCOUNTER — OFFICE VISIT (OUTPATIENT)
Dept: OBGYN CLINIC | Facility: CLINIC | Age: 37
End: 2022-10-31
Payer: MEDICAID

## 2022-10-31 VITALS
WEIGHT: 139.63 LBS | HEART RATE: 62 BPM | DIASTOLIC BLOOD PRESSURE: 69 MMHG | BODY MASS INDEX: 26 KG/M2 | SYSTOLIC BLOOD PRESSURE: 104 MMHG

## 2022-10-31 DIAGNOSIS — N89.8 VAGINAL ODOR: ICD-10-CM

## 2022-10-31 DIAGNOSIS — Z86.19 HISTORY OF HPV INFECTION: ICD-10-CM

## 2022-10-31 DIAGNOSIS — Z12.4 SCREENING FOR CERVICAL CANCER: ICD-10-CM

## 2022-10-31 DIAGNOSIS — Z01.419 WELL WOMAN EXAM WITH ROUTINE GYNECOLOGICAL EXAM: Primary | ICD-10-CM

## 2022-10-31 LAB — TRICHOMONAS SCREEN: NEGATIVE

## 2022-10-31 PROCEDURE — 3078F DIAST BP <80 MM HG: CPT | Performed by: NURSE PRACTITIONER

## 2022-10-31 PROCEDURE — 3074F SYST BP LT 130 MM HG: CPT | Performed by: NURSE PRACTITIONER

## 2022-10-31 PROCEDURE — 99395 PREV VISIT EST AGE 18-39: CPT | Performed by: NURSE PRACTITIONER

## 2022-11-01 ENCOUNTER — PATIENT MESSAGE (OUTPATIENT)
Dept: OBGYN CLINIC | Facility: CLINIC | Age: 37
End: 2022-11-01

## 2022-11-01 LAB
C TRACH DNA SPEC QL NAA+PROBE: NEGATIVE
HPV I/H RISK 1 DNA SPEC QL NAA+PROBE: POSITIVE
N GONORRHOEA DNA SPEC QL NAA+PROBE: NEGATIVE

## 2022-11-01 NOTE — TELEPHONE ENCOUNTER
Pt informed her HPV was positive, but we need to wait for the genotyping and the pap results to know the next step. Pt states she has had a colpo before and knows the procedure. Pt asked if she needs to worry about passing it on to her partner. Pt advised it is a very common infection and if they have been having IC without protection, chances are he may have it already. Pt informed there is no test for men. Pt advised it is up to her and her partner if they will trish protection.

## 2022-11-01 NOTE — TELEPHONE ENCOUNTER
From: Orlando Rasheed  To: JOSELIN Burgess  Sent: 11/1/2022 1:15 PM CDT  Subject: HPV+    Hi, I have a question about the results I received on my chart. It says it came back HPV+ High risk. Can you give me a call at your earliest convenience so you can further explain the results. And answer a couple of questions that I have. Thank you!

## 2022-11-02 LAB
GENITAL VAGINOSIS SCREEN: POSITIVE
TRICHOMONAS SCREEN: NEGATIVE

## 2022-11-04 LAB
HPV16 DNA CVX QL PROBE+SIG AMP: POSITIVE
HPV18 DNA CVX QL PROBE+SIG AMP: NEGATIVE

## 2022-11-10 ENCOUNTER — TELEPHONE (OUTPATIENT)
Dept: OBGYN CLINIC | Facility: CLINIC | Age: 37
End: 2022-11-10

## 2022-11-10 NOTE — TELEPHONE ENCOUNTER
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     Needs colposcopy    JOSELIN Arrieta

## 2022-11-11 RX ORDER — METRONIDAZOLE 500 MG/1
500 TABLET ORAL 2 TIMES DAILY
Qty: 14 TABLET | Refills: 0 | Status: SHIPPED | OUTPATIENT
Start: 2022-11-11 | End: 2022-11-18

## 2022-11-11 NOTE — TELEPHONE ENCOUNTER
Pt informed of results and recs for colpo. Appt made with WILLIAM on 12/6. Pt is not on any birth control. [facility] Does pt need a qual the day before colpo? Or will you do a upt in office at appt? Message also to [facility]. Pt complaining of abnormal vaginal discharge and a sour odor. Culture shows BV. How would you like to treat?

## 2022-11-11 NOTE — TELEPHONE ENCOUNTER
Patient prefers Flagyl BID x 7 days. Advised pt, no etoh on this medication. Patient verbalized understanding.

## 2022-12-06 ENCOUNTER — HOSPITAL ENCOUNTER (OUTPATIENT)
Dept: MAMMOGRAPHY | Facility: HOSPITAL | Age: 37
Discharge: HOME | End: 2022-12-06
Attending: NURSE PRACTITIONER
Payer: MEDICAID

## 2022-12-06 ENCOUNTER — OFFICE VISIT (OUTPATIENT)
Dept: OBGYN CLINIC | Facility: CLINIC | Age: 37
End: 2022-12-06
Payer: MEDICAID

## 2022-12-06 VITALS
DIASTOLIC BLOOD PRESSURE: 80 MMHG | SYSTOLIC BLOOD PRESSURE: 117 MMHG | WEIGHT: 140 LBS | HEART RATE: 80 BPM | BODY MASS INDEX: 26 KG/M2

## 2022-12-06 DIAGNOSIS — Z12.31 ENCOUNTER FOR SCREENING MAMMOGRAM FOR MALIGNANT NEOPLASM OF BREAST: ICD-10-CM

## 2022-12-06 DIAGNOSIS — N87.0 MILD DYSPLASIA OF CERVIX: ICD-10-CM

## 2022-12-06 DIAGNOSIS — Z32.00 UNCONFIRMED PREGNANCY: Primary | ICD-10-CM

## 2022-12-06 LAB — CONTROL LINE PRESENT WITH A CLEAR BACKGROUND (YES/NO): YES YES/NO

## 2022-12-06 PROCEDURE — 77063 BREAST TOMOSYNTHESIS BI: CPT | Performed by: NURSE PRACTITIONER

## 2022-12-06 PROCEDURE — 57454 BX/CURETT OF CERVIX W/SCOPE: CPT | Performed by: OBSTETRICS & GYNECOLOGY

## 2022-12-06 PROCEDURE — 77067 SCR MAMMO BI INCL CAD: CPT | Performed by: NURSE PRACTITIONER

## 2022-12-06 PROCEDURE — 3079F DIAST BP 80-89 MM HG: CPT | Performed by: OBSTETRICS & GYNECOLOGY

## 2022-12-06 PROCEDURE — 3074F SYST BP LT 130 MM HG: CPT | Performed by: OBSTETRICS & GYNECOLOGY

## 2022-12-06 PROCEDURE — 81025 URINE PREGNANCY TEST: CPT | Performed by: OBSTETRICS & GYNECOLOGY

## 2022-12-06 NOTE — PROCEDURES
Colpo w/Cx Biopsy and ECC    Pregnancy Results: negative from urine test   Birth control method(s) used:  ; date last used:      Consent signed. Procedure discussed with patient in detail including indication, risk, benefits, alternatives and complications. Pre-Procedure:  Cervix prepped with:  Acetic acid    Procedure:  Under satisfactory analgesia, the patient was prepped and draped in the dorsal lithotomy position. Sacaton speculum was placed in the vagina. Under colposcopic examination the transition zone was seen in entirety. Cervical biopsy performed with cervical biopsy punch at 6 and 12 o'clock. Endocervix was curetted using a Kervorkian curette. Silver Nitrate was applied. Specimen sent to pathology. Patient tolerated procedure well.       Findings:  Acetowhite epithelium    Impression:  Low-grade dysplasia

## 2022-12-08 ENCOUNTER — PATIENT MESSAGE (OUTPATIENT)
Dept: OBGYN CLINIC | Facility: CLINIC | Age: 37
End: 2022-12-08

## 2022-12-08 NOTE — TELEPHONE ENCOUNTER
Pt states she used monistat #3 d/t symptoms of yeast and it cleared hers symptoms. Pt states now for 4 days has been experiencing vaginal discharge and foul odor. Denies UTI symptoms. Pt offered appt for today but declined due to having a full flow of her cycle. Pt requesting appt for next Wednesday d/t work schedule. Pt accepted appt on 12/14 with [facility]. Pt advised if she develops back pain, fever, frequent urination, pain or burning with urination to let us know. Pt agrees and states understanding. Msg to [facility] as FYI.

## 2022-12-08 NOTE — TELEPHONE ENCOUNTER
Okay. Did she take the oral flagyl a month ago due to the bacterial vaginosis infection. Was rx'd oral metronidazole on 11/11. If she did take then yes agree with appt for reculture. If did not take,we can represcribe above.

## 2022-12-14 ENCOUNTER — OFFICE VISIT (OUTPATIENT)
Dept: OBGYN CLINIC | Facility: CLINIC | Age: 37
End: 2022-12-14
Payer: MEDICAID

## 2022-12-14 VITALS
SYSTOLIC BLOOD PRESSURE: 111 MMHG | WEIGHT: 138.19 LBS | DIASTOLIC BLOOD PRESSURE: 77 MMHG | BODY MASS INDEX: 25 KG/M2 | HEART RATE: 69 BPM

## 2022-12-14 DIAGNOSIS — Z11.3 SCREEN FOR STD (SEXUALLY TRANSMITTED DISEASE): ICD-10-CM

## 2022-12-14 DIAGNOSIS — N89.8 VAGINAL ODOR: Primary | ICD-10-CM

## 2022-12-14 PROCEDURE — 3074F SYST BP LT 130 MM HG: CPT | Performed by: NURSE PRACTITIONER

## 2022-12-14 PROCEDURE — 99212 OFFICE O/P EST SF 10 MIN: CPT | Performed by: NURSE PRACTITIONER

## 2022-12-14 PROCEDURE — 3078F DIAST BP <80 MM HG: CPT | Performed by: NURSE PRACTITIONER

## 2022-12-15 LAB
C TRACH DNA SPEC QL NAA+PROBE: NEGATIVE
N GONORRHOEA DNA SPEC QL NAA+PROBE: NEGATIVE

## 2022-12-16 LAB
GENITAL VAGINOSIS SCREEN: NEGATIVE
TRICHOMONAS SCREEN: NEGATIVE

## 2023-01-30 ENCOUNTER — OFFICE VISIT (OUTPATIENT)
Dept: ENDOCRINOLOGY CLINIC | Facility: CLINIC | Age: 38
End: 2023-01-30

## 2023-01-30 VITALS
HEART RATE: 69 BPM | BODY MASS INDEX: 26 KG/M2 | DIASTOLIC BLOOD PRESSURE: 79 MMHG | WEIGHT: 141 LBS | SYSTOLIC BLOOD PRESSURE: 113 MMHG

## 2023-01-30 DIAGNOSIS — E11.65 UNCONTROLLED TYPE 2 DIABETES MELLITUS WITH HYPERGLYCEMIA (HCC): Primary | ICD-10-CM

## 2023-01-30 LAB
CARTRIDGE LOT#: ABNORMAL NUMERIC
GLUCOSE BLOOD: 283
HEMOGLOBIN A1C: 9.5 % (ref 4.3–5.6)
TEST STRIP LOT #: NORMAL NUMERIC

## 2023-01-30 PROCEDURE — 3078F DIAST BP <80 MM HG: CPT | Performed by: INTERNAL MEDICINE

## 2023-01-30 PROCEDURE — 83036 HEMOGLOBIN GLYCOSYLATED A1C: CPT | Performed by: INTERNAL MEDICINE

## 2023-01-30 PROCEDURE — 99214 OFFICE O/P EST MOD 30 MIN: CPT | Performed by: INTERNAL MEDICINE

## 2023-01-30 PROCEDURE — 3046F HEMOGLOBIN A1C LEVEL >9.0%: CPT | Performed by: INTERNAL MEDICINE

## 2023-01-30 PROCEDURE — 3074F SYST BP LT 130 MM HG: CPT | Performed by: INTERNAL MEDICINE

## 2023-01-30 PROCEDURE — 82947 ASSAY GLUCOSE BLOOD QUANT: CPT | Performed by: INTERNAL MEDICINE

## 2023-01-30 RX ORDER — DULAGLUTIDE 0.75 MG/.5ML
0.75 INJECTION, SOLUTION SUBCUTANEOUS WEEKLY
Qty: 12 EACH | Refills: 1 | Status: SHIPPED | OUTPATIENT
Start: 2023-01-30

## 2023-01-30 RX ORDER — GLIMEPIRIDE 4 MG/1
4 TABLET ORAL
Qty: 90 TABLET | Refills: 1 | Status: SHIPPED | OUTPATIENT
Start: 2023-01-30

## 2023-02-20 ENCOUNTER — OFFICE VISIT (OUTPATIENT)
Dept: INTERNAL MEDICINE CLINIC | Facility: CLINIC | Age: 38
End: 2023-02-20
Payer: MEDICAID

## 2023-02-20 VITALS
HEIGHT: 62 IN | OXYGEN SATURATION: 98 % | RESPIRATION RATE: 14 BRPM | BODY MASS INDEX: 26.73 KG/M2 | HEART RATE: 77 BPM | WEIGHT: 145.25 LBS | SYSTOLIC BLOOD PRESSURE: 112 MMHG | TEMPERATURE: 97 F | DIASTOLIC BLOOD PRESSURE: 70 MMHG

## 2023-02-20 DIAGNOSIS — E11.65 TYPE 2 DIABETES MELLITUS WITH HYPERGLYCEMIA, WITHOUT LONG-TERM CURRENT USE OF INSULIN (HCC): Primary | ICD-10-CM

## 2023-02-20 DIAGNOSIS — E78.2 MIXED HYPERLIPIDEMIA: ICD-10-CM

## 2023-02-20 DIAGNOSIS — R20.0 NUMBNESS OF RIGHT FOOT: ICD-10-CM

## 2023-02-20 PROBLEM — N63.0 BREAST LUMP: Status: RESOLVED | Noted: 2020-09-12 | Resolved: 2023-02-20

## 2023-02-20 PROBLEM — N76.0 VAGINITIS: Status: RESOLVED | Noted: 2020-06-22 | Resolved: 2023-02-20

## 2023-02-20 PROCEDURE — 3008F BODY MASS INDEX DOCD: CPT | Performed by: INTERNAL MEDICINE

## 2023-02-20 PROCEDURE — 3078F DIAST BP <80 MM HG: CPT | Performed by: INTERNAL MEDICINE

## 2023-02-20 PROCEDURE — 99214 OFFICE O/P EST MOD 30 MIN: CPT | Performed by: INTERNAL MEDICINE

## 2023-02-20 PROCEDURE — 3074F SYST BP LT 130 MM HG: CPT | Performed by: INTERNAL MEDICINE

## 2023-02-21 ENCOUNTER — OFFICE VISIT (OUTPATIENT)
Dept: OBGYN CLINIC | Facility: CLINIC | Age: 38
End: 2023-02-21

## 2023-02-21 VITALS
DIASTOLIC BLOOD PRESSURE: 78 MMHG | SYSTOLIC BLOOD PRESSURE: 116 MMHG | WEIGHT: 144 LBS | BODY MASS INDEX: 26 KG/M2 | HEART RATE: 73 BPM

## 2023-02-21 DIAGNOSIS — Z30.011 ORAL CONTRACEPTION INITIAL PRESCRIPTION: ICD-10-CM

## 2023-02-21 DIAGNOSIS — Z72.51 UNPROTECTED SEXUAL INTERCOURSE: ICD-10-CM

## 2023-02-21 DIAGNOSIS — Z30.09 GENERAL COUNSELING AND ADVICE FOR CONTRACEPTIVE MANAGEMENT: Primary | ICD-10-CM

## 2023-02-21 LAB
CONTROL LINE PRESENT WITH A CLEAR BACKGROUND (YES/NO): YES YES/NO
PREGNANCY TEST, URINE: NEGATIVE

## 2023-02-21 PROCEDURE — 3078F DIAST BP <80 MM HG: CPT | Performed by: NURSE PRACTITIONER

## 2023-02-21 PROCEDURE — 81025 URINE PREGNANCY TEST: CPT | Performed by: NURSE PRACTITIONER

## 2023-02-21 PROCEDURE — 99213 OFFICE O/P EST LOW 20 MIN: CPT | Performed by: NURSE PRACTITIONER

## 2023-02-21 PROCEDURE — 3074F SYST BP LT 130 MM HG: CPT | Performed by: NURSE PRACTITIONER

## 2023-02-21 RX ORDER — DROSPIRENONE 4 MG/1
1 TABLET, FILM COATED ORAL DAILY
Qty: 28 TABLET | Refills: 11 | Status: SHIPPED | OUTPATIENT
Start: 2023-02-21 | End: 2024-02-21

## 2023-02-21 RX ORDER — LEVONORGESTREL 1.5 MG/1
1.5 TABLET ORAL ONCE
Qty: 1 TABLET | Refills: 0 | Status: SHIPPED | OUTPATIENT
Start: 2023-02-21 | End: 2023-02-21

## 2023-02-21 NOTE — PATIENT INSTRUCTIONS
FACT SHEET: PROGESTIN-ONLY/ MINI-PILL               HOW DOES THE MINI-PILL WORK? The mini-pill contains a hormone like the ones your body makes. It works by making the mucus in your cervix too thick for sperm to pass through. If sperm cannot reach the egg, you cannot get pregnant. No method of birth control is 100% effective, but birth control pills are 92-98% effective if you take them each day. HOW DO I START THE MINI-PILL? There are 2 ways to start the pill:    Quick Start: Take your first pill as soon as you get the pack. Next period: Take your first pill soon after your next period begins. If you take your first pill up to 5 days after the start of your period, you are protected against pregnancy right away. If you take your first pill more than 5 days after the start of your period, you should use condoms as back-up for the first 7 days. HOW DO I USE THE MINI-PILL? Once you start using the pill, take 1 pill each day. Take your pill at the same time each day. After you finish a pack of pills, you should start a new pack the next day. You should have NO day without a pill. WHAT IF I MISS MINI-PILLS? I forgot ONE pill:  Take your pill as soon as you can. If you take your pill more than 3 hours late, use condoms for the next 7 days. I forgot TWO pills or more: Take your pill as soon as you can. Take your next pill at the usual time. Use condoms for the next 7 days. Use emergency contraception (EC) if you have unprotected sex. WHAT IF I STOPPED TAKING THE MINI-PILL AND HAD UNPROTECTED SEX? Take Emergency Contraception (EC) right away. EC can prevent pregnancy up to 5 days after sex, and it works better the sooner you take it. HOW DOES THE MINI-PILL HELP ME? The mini-pill is safe & effective birth control. The mini-pill is safe for you to use while breastfeeding. The mini-pill has no effect on your ability to get pregnant in the future, after you stop taking it. HOW WILL I FEEL ON THE MINI-PILL? You will feel about the same. Most women notice changes in their periods. You may have spotting or no period at all. This is normal.  You may have nausea, spotting, weight change, and/or breast pain. These problems often go away after 2-3 months. DOES THE MINI-PILL HAVE RISKS? The mini-pill is very safe. **Remember, the mini-pill does not protect you from Sexually Transmitted Infections or HIV. Always use condoms to protect yourself! **    Reproductive health access project  Www.reproductiveaccess. org

## 2023-03-10 ENCOUNTER — PATIENT MESSAGE (OUTPATIENT)
Dept: OBGYN CLINIC | Facility: CLINIC | Age: 38
End: 2023-03-10

## 2023-03-11 NOTE — TELEPHONE ENCOUNTER
Agree. She also took plan B at same time started slynd ocp. So that could have caused more abnormal bleeding.

## 2023-03-18 ENCOUNTER — TELEPHONE (OUTPATIENT)
Dept: INTERNAL MEDICINE CLINIC | Facility: CLINIC | Age: 38
End: 2023-03-18

## 2023-04-24 ENCOUNTER — TELEPHONE (OUTPATIENT)
Dept: INTERNAL MEDICINE CLINIC | Facility: CLINIC | Age: 38
End: 2023-04-24

## 2023-04-24 ENCOUNTER — HOSPITAL ENCOUNTER (EMERGENCY)
Facility: HOSPITAL | Age: 38
Discharge: HOME | End: 2023-04-24
Payer: MEDICAID

## 2023-04-24 VITALS
HEART RATE: 73 BPM | TEMPERATURE: 98 F | DIASTOLIC BLOOD PRESSURE: 73 MMHG | SYSTOLIC BLOOD PRESSURE: 127 MMHG | WEIGHT: 143 LBS | BODY MASS INDEX: 26 KG/M2 | OXYGEN SATURATION: 98 % | RESPIRATION RATE: 16 BRPM

## 2023-04-24 DIAGNOSIS — S91.331A PUNCTURE WOUND OF RIGHT FOOT, INITIAL ENCOUNTER: Primary | ICD-10-CM

## 2023-04-24 LAB — B-HCG UR QL: NEGATIVE

## 2023-04-24 PROCEDURE — 99284 EMERGENCY DEPT VISIT MOD MDM: CPT

## 2023-04-24 PROCEDURE — 90471 IMMUNIZATION ADMIN: CPT

## 2023-04-24 PROCEDURE — 99283 EMERGENCY DEPT VISIT LOW MDM: CPT

## 2023-04-24 PROCEDURE — 81025 URINE PREGNANCY TEST: CPT

## 2023-04-24 RX ORDER — LEVOFLOXACIN 750 MG/1
750 TABLET ORAL DAILY
Qty: 7 TABLET | Refills: 0 | Status: SHIPPED | OUTPATIENT
Start: 2023-04-24 | End: 2023-05-01

## 2023-04-24 NOTE — ED INITIAL ASSESSMENT (HPI)
Patient stepped on nail, right foot. Site appears ok. No redness noted Patient requesting TDAP.   Hx of diabetes     Patient also notes late menstrual cycle 36.8

## 2023-04-24 NOTE — DISCHARGE INSTRUCTIONS
Closely monitor your blood sugars for low blood sugar while taking this medicine  Seek medical attention if you develop redness, swelling, or worsening pain

## 2023-04-25 NOTE — TELEPHONE ENCOUNTER
Spoke, with the patient and informed her of the message below. Pt stated that Dr. Keke Keys is her primary doctor. Per the patient she will make an appointment to see him.

## 2023-05-01 ENCOUNTER — LAB ENCOUNTER (OUTPATIENT)
Dept: LAB | Facility: HOSPITAL | Age: 38
End: 2023-05-01
Attending: OBSTETRICS & GYNECOLOGY
Payer: MEDICAID

## 2023-05-01 ENCOUNTER — OFFICE VISIT (OUTPATIENT)
Dept: OBGYN CLINIC | Facility: CLINIC | Age: 38
End: 2023-05-01

## 2023-05-01 VITALS
WEIGHT: 143.38 LBS | SYSTOLIC BLOOD PRESSURE: 109 MMHG | BODY MASS INDEX: 26.39 KG/M2 | DIASTOLIC BLOOD PRESSURE: 72 MMHG | HEART RATE: 81 BPM | HEIGHT: 62 IN

## 2023-05-01 DIAGNOSIS — N92.6 MISSED MENSES: Primary | ICD-10-CM

## 2023-05-01 DIAGNOSIS — N92.6 MISSED MENSES: ICD-10-CM

## 2023-05-01 LAB
B-HCG SERPL-ACNC: <1 MIU/ML
ESTRADIOL SERPL-MCNC: 217.2 PG/ML
FSH SERPL-ACNC: 4.6 MIU/ML
LH SERPL-ACNC: 9.6 MIU/ML
PROLACTIN SERPL-MCNC: 24.1 NG/ML
TSI SER-ACNC: 0.9 MIU/ML (ref 0.36–3.74)

## 2023-05-01 PROCEDURE — 84443 ASSAY THYROID STIM HORMONE: CPT | Performed by: OBSTETRICS & GYNECOLOGY

## 2023-05-01 PROCEDURE — 3008F BODY MASS INDEX DOCD: CPT | Performed by: OBSTETRICS & GYNECOLOGY

## 2023-05-01 PROCEDURE — 3078F DIAST BP <80 MM HG: CPT | Performed by: OBSTETRICS & GYNECOLOGY

## 2023-05-01 PROCEDURE — 99213 OFFICE O/P EST LOW 20 MIN: CPT | Performed by: OBSTETRICS & GYNECOLOGY

## 2023-05-01 PROCEDURE — 83002 ASSAY OF GONADOTROPIN (LH): CPT

## 2023-05-01 PROCEDURE — 84702 CHORIONIC GONADOTROPIN TEST: CPT

## 2023-05-01 PROCEDURE — 82670 ASSAY OF TOTAL ESTRADIOL: CPT

## 2023-05-01 PROCEDURE — 3074F SYST BP LT 130 MM HG: CPT | Performed by: OBSTETRICS & GYNECOLOGY

## 2023-05-01 PROCEDURE — 83001 ASSAY OF GONADOTROPIN (FSH): CPT

## 2023-05-01 PROCEDURE — 84146 ASSAY OF PROLACTIN: CPT

## 2023-05-01 PROCEDURE — 36415 COLL VENOUS BLD VENIPUNCTURE: CPT | Performed by: OBSTETRICS & GYNECOLOGY

## 2023-05-01 RX ORDER — LEVONORGESTREL 1.5 MG/1
TABLET ORAL
COMMUNITY
Start: 2023-02-21

## 2023-05-18 ENCOUNTER — LAB ENCOUNTER (OUTPATIENT)
Dept: LAB | Facility: HOSPITAL | Age: 38
End: 2023-05-18
Attending: INTERNAL MEDICINE
Payer: MEDICAID

## 2023-05-18 ENCOUNTER — OFFICE VISIT (OUTPATIENT)
Dept: ENDOCRINOLOGY CLINIC | Facility: CLINIC | Age: 38
End: 2023-05-18

## 2023-05-18 VITALS
DIASTOLIC BLOOD PRESSURE: 65 MMHG | HEART RATE: 58 BPM | SYSTOLIC BLOOD PRESSURE: 108 MMHG | BODY MASS INDEX: 26 KG/M2 | WEIGHT: 141 LBS

## 2023-05-18 DIAGNOSIS — E78.5 HYPERLIPIDEMIA, UNSPECIFIED HYPERLIPIDEMIA TYPE: ICD-10-CM

## 2023-05-18 DIAGNOSIS — E11.65 UNCONTROLLED TYPE 2 DIABETES MELLITUS WITH HYPERGLYCEMIA (HCC): Primary | ICD-10-CM

## 2023-05-18 DIAGNOSIS — E11.65 TYPE 2 DIABETES MELLITUS WITH HYPERGLYCEMIA, WITHOUT LONG-TERM CURRENT USE OF INSULIN (HCC): ICD-10-CM

## 2023-05-18 DIAGNOSIS — E78.2 MIXED HYPERLIPIDEMIA: ICD-10-CM

## 2023-05-18 DIAGNOSIS — E11.65 UNCONTROLLED TYPE 2 DIABETES MELLITUS WITH HYPERGLYCEMIA (HCC): ICD-10-CM

## 2023-05-18 LAB
ALBUMIN SERPL-MCNC: 3.6 G/DL (ref 3.4–5)
ALBUMIN/GLOB SERPL: 1 {RATIO} (ref 1–2)
ALP LIVER SERPL-CCNC: 52 U/L
ALT SERPL-CCNC: 16 U/L
ANION GAP SERPL CALC-SCNC: 7 MMOL/L (ref 0–18)
AST SERPL-CCNC: 10 U/L (ref 15–37)
BILIRUB SERPL-MCNC: 0.4 MG/DL (ref 0.1–2)
BUN BLD-MCNC: 7 MG/DL (ref 7–18)
BUN/CREAT SERPL: 13 (ref 10–20)
CALCIUM BLD-MCNC: 8.7 MG/DL (ref 8.5–10.1)
CARTRIDGE LOT#: ABNORMAL NUMERIC
CHLORIDE SERPL-SCNC: 107 MMOL/L (ref 98–112)
CHOLEST SERPL-MCNC: 159 MG/DL (ref ?–200)
CO2 SERPL-SCNC: 26 MMOL/L (ref 21–32)
CREAT BLD-MCNC: 0.54 MG/DL
CREAT UR-SCNC: 237 MG/DL
EST. AVERAGE GLUCOSE BLD GHB EST-MCNC: 183 MG/DL (ref 68–126)
FASTING PATIENT LIPID ANSWER: YES
FASTING STATUS PATIENT QL REPORTED: YES
GFR SERPLBLD BASED ON 1.73 SQ M-ARVRAT: 121 ML/MIN/1.73M2 (ref 60–?)
GLOBULIN PLAS-MCNC: 3.5 G/DL (ref 2.8–4.4)
GLUCOSE BLD-MCNC: 113 MG/DL (ref 70–99)
GLUCOSE BLOOD: 141
HBA1C MFR BLD: 8 % (ref ?–5.7)
HDLC SERPL-MCNC: 42 MG/DL (ref 40–59)
HEMOGLOBIN A1C: 8 % (ref 4.3–5.6)
LDLC SERPL CALC-MCNC: 104 MG/DL (ref ?–100)
MICROALBUMIN UR-MCNC: 1.78 MG/DL
MICROALBUMIN/CREAT 24H UR-RTO: 7.5 UG/MG (ref ?–30)
NONHDLC SERPL-MCNC: 117 MG/DL (ref ?–130)
OSMOLALITY SERPL CALC.SUM OF ELEC: 289 MOSM/KG (ref 275–295)
POTASSIUM SERPL-SCNC: 4.1 MMOL/L (ref 3.5–5.1)
PROT SERPL-MCNC: 7.1 G/DL (ref 6.4–8.2)
SODIUM SERPL-SCNC: 140 MMOL/L (ref 136–145)
TEST STRIP LOT #: NORMAL NUMERIC
TRIGL SERPL-MCNC: 65 MG/DL (ref 30–149)
TSI SER-ACNC: 1.04 MIU/ML (ref 0.36–3.74)
VLDLC SERPL CALC-MCNC: 11 MG/DL (ref 0–30)

## 2023-05-18 PROCEDURE — 36415 COLL VENOUS BLD VENIPUNCTURE: CPT

## 2023-05-18 PROCEDURE — 3061F NEG MICROALBUMINURIA REV: CPT | Performed by: INTERNAL MEDICINE

## 2023-05-18 PROCEDURE — 99214 OFFICE O/P EST MOD 30 MIN: CPT | Performed by: INTERNAL MEDICINE

## 2023-05-18 PROCEDURE — 3078F DIAST BP <80 MM HG: CPT | Performed by: INTERNAL MEDICINE

## 2023-05-18 PROCEDURE — 82570 ASSAY OF URINE CREATININE: CPT

## 2023-05-18 PROCEDURE — 3074F SYST BP LT 130 MM HG: CPT | Performed by: INTERNAL MEDICINE

## 2023-05-18 PROCEDURE — 80053 COMPREHEN METABOLIC PANEL: CPT

## 2023-05-18 PROCEDURE — 82043 UR ALBUMIN QUANTITATIVE: CPT

## 2023-05-18 PROCEDURE — 83036 HEMOGLOBIN GLYCOSYLATED A1C: CPT | Performed by: INTERNAL MEDICINE

## 2023-05-18 PROCEDURE — 3052F HG A1C>EQUAL 8.0%<EQUAL 9.0%: CPT | Performed by: INTERNAL MEDICINE

## 2023-05-18 PROCEDURE — 84443 ASSAY THYROID STIM HORMONE: CPT

## 2023-05-18 PROCEDURE — 82947 ASSAY GLUCOSE BLOOD QUANT: CPT | Performed by: INTERNAL MEDICINE

## 2023-05-18 PROCEDURE — 83036 HEMOGLOBIN GLYCOSYLATED A1C: CPT

## 2023-05-18 PROCEDURE — 80061 LIPID PANEL: CPT

## 2023-05-18 RX ORDER — DULAGLUTIDE 1.5 MG/.5ML
1.5 INJECTION, SOLUTION SUBCUTANEOUS WEEKLY
Qty: 12 EACH | Refills: 1 | Status: SHIPPED | OUTPATIENT
Start: 2023-05-18

## 2023-06-17 PROCEDURE — 12001 RPR S/N/AX/GEN/TRNK 2.5CM/<: CPT

## 2023-06-17 PROCEDURE — 99283 EMERGENCY DEPT VISIT LOW MDM: CPT

## 2023-06-18 ENCOUNTER — HOSPITAL ENCOUNTER (EMERGENCY)
Facility: HOSPITAL | Age: 38
Discharge: HOME | End: 2023-06-18
Attending: EMERGENCY MEDICINE
Payer: MEDICAID

## 2023-06-18 VITALS
RESPIRATION RATE: 18 BRPM | TEMPERATURE: 98 F | BODY MASS INDEX: 25.34 KG/M2 | WEIGHT: 143 LBS | SYSTOLIC BLOOD PRESSURE: 136 MMHG | DIASTOLIC BLOOD PRESSURE: 87 MMHG | HEART RATE: 71 BPM | OXYGEN SATURATION: 99 % | HEIGHT: 63 IN

## 2023-06-18 DIAGNOSIS — S61.309A FINGERNAIL AVULSION, COMPLETE, INITIAL ENCOUNTER: Primary | ICD-10-CM

## 2023-06-18 NOTE — ED INITIAL ASSESSMENT (HPI)
PT reports she fell off of mechanical bull appx 2 hours ago. Left fingernail injury, nail is askew and partially detached.

## 2023-07-18 ENCOUNTER — PATIENT MESSAGE (OUTPATIENT)
Dept: ENDOCRINOLOGY CLINIC | Facility: CLINIC | Age: 38
End: 2023-07-18

## 2023-07-18 NOTE — TELEPHONE ENCOUNTER
Please ask her to stop both medications. She will need sooner follow up to change medications given pregnancy. I think I have an opening on Friday and could see her then?

## 2023-07-19 NOTE — TELEPHONE ENCOUNTER
Dr. Mir Silva doesn't have openings this week. Her first res 25 is Friday 8/25, unless patient would be willing to go to [place]. Ok to offer her this Friday at 12:15 with you?  Or Samira Alvarado has a VV spot this Friday at 3:15 pm?

## 2023-07-19 NOTE — TELEPHONE ENCOUNTER
[employer] message sent    Patient scheduled at 12:30pm (unable to schedule at 12:15pm slot) noted correct time.

## 2023-07-19 NOTE — TELEPHONE ENCOUNTER
Dr. Yomaira Vgoel    No openings tomorrow for any of the providers unless someone would be willing to [facility]

## 2023-07-20 ENCOUNTER — OFFICE VISIT (OUTPATIENT)
Dept: INTERNAL MEDICINE CLINIC | Facility: CLINIC | Age: 38
End: 2023-07-20
Payer: MEDICAID

## 2023-07-20 ENCOUNTER — OFFICE VISIT (OUTPATIENT)
Dept: ENDOCRINOLOGY CLINIC | Facility: CLINIC | Age: 38
End: 2023-07-20

## 2023-07-20 VITALS
SYSTOLIC BLOOD PRESSURE: 115 MMHG | HEART RATE: 78 BPM | HEIGHT: 62.99 IN | DIASTOLIC BLOOD PRESSURE: 76 MMHG | BODY MASS INDEX: 25.34 KG/M2 | WEIGHT: 143 LBS

## 2023-07-20 VITALS
BODY MASS INDEX: 25 KG/M2 | SYSTOLIC BLOOD PRESSURE: 102 MMHG | OXYGEN SATURATION: 98 % | DIASTOLIC BLOOD PRESSURE: 62 MMHG | WEIGHT: 143.81 LBS | TEMPERATURE: 98 F | HEART RATE: 68 BPM

## 2023-07-20 DIAGNOSIS — E11.65 TYPE 2 DIABETES MELLITUS WITH HYPERGLYCEMIA, WITHOUT LONG-TERM CURRENT USE OF INSULIN (HCC): ICD-10-CM

## 2023-07-20 DIAGNOSIS — O24.111 PRE-EXISTING TYPE 2 DIABETES MELLITUS DURING PREGNANCY IN FIRST TRIMESTER: Primary | ICD-10-CM

## 2023-07-20 DIAGNOSIS — N91.2 AMENORRHEA: ICD-10-CM

## 2023-07-20 DIAGNOSIS — O23.41 UTI (URINARY TRACT INFECTION) DURING PREGNANCY, FIRST TRIMESTER: Primary | ICD-10-CM

## 2023-07-20 LAB
BILIRUB UR QL: NEGATIVE
BILIRUBIN: NEGATIVE
CARTRIDGE LOT#: ABNORMAL NUMERIC
CONTROL LINE PRESENT WITH A CLEAR BACKGROUND (YES/NO): YES YES/NO
GLUCOSE (URINE DIPSTICK): NEGATIVE MG/DL
GLUCOSE BLOOD: 186
GLUCOSE UR-MCNC: NORMAL MG/DL
HEMOGLOBIN A1C: 7.5 % (ref 4.3–5.6)
KETONES (URINE DIPSTICK): NEGATIVE MG/DL
KETONES UR-MCNC: NEGATIVE MG/DL
LEUKOCYTE ESTERASE UR QL STRIP.AUTO: 500
MULTISTIX LOT#: ABNORMAL NUMERIC
NITRITE, URINE: NEGATIVE
PH UR: 5.5 [PH] (ref 5–8)
PH, URINE: 5.5 (ref 4.5–8)
PREGNANCY TEST, URINE: POSITIVE
PROT UR-MCNC: NEGATIVE MG/DL
PROTEIN (URINE DIPSTICK): NEGATIVE MG/DL
SP GR UR STRIP: <1.005 (ref 1–1.03)
SPECIFIC GRAVITY: 1.01 (ref 1–1.03)
TEST STRIP LOT #: NORMAL NUMERIC
UROBILINOGEN UR STRIP-ACNC: NORMAL
UROBILINOGEN,SEMI-QN: 0.2 MG/DL (ref 0–1.9)

## 2023-07-20 PROCEDURE — 87086 URINE CULTURE/COLONY COUNT: CPT | Performed by: INTERNAL MEDICINE

## 2023-07-20 PROCEDURE — 81001 URINALYSIS AUTO W/SCOPE: CPT | Performed by: INTERNAL MEDICINE

## 2023-07-20 PROCEDURE — 87077 CULTURE AEROBIC IDENTIFY: CPT | Performed by: INTERNAL MEDICINE

## 2023-07-20 PROCEDURE — 87186 SC STD MICRODIL/AGAR DIL: CPT | Performed by: INTERNAL MEDICINE

## 2023-07-20 RX ORDER — BLOOD SUGAR DIAGNOSTIC
STRIP MISCELLANEOUS
Qty: 400 STRIP | Refills: 1 | Status: SHIPPED | OUTPATIENT
Start: 2023-07-20

## 2023-07-20 RX ORDER — AMOXICILLIN AND CLAVULANATE POTASSIUM 500; 125 MG/1; MG/1
1 TABLET, FILM COATED ORAL 2 TIMES DAILY
Qty: 14 TABLET | Refills: 0 | Status: SHIPPED | OUTPATIENT
Start: 2023-07-20 | End: 2023-07-27

## 2023-07-20 RX ORDER — LANCETS 33 GAUGE
1 EACH MISCELLANEOUS 4 TIMES DAILY
Qty: 400 EACH | Refills: 1 | Status: SHIPPED | OUTPATIENT
Start: 2023-07-20

## 2023-07-20 RX ORDER — ACYCLOVIR 400 MG/1
1 TABLET ORAL
Qty: 3 EACH | Refills: 5 | Status: SHIPPED | OUTPATIENT
Start: 2023-07-20

## 2023-07-20 RX ORDER — INSULIN LISPRO 100 [IU]/ML
INJECTION, SOLUTION INTRAVENOUS; SUBCUTANEOUS
Qty: 15 ML | Refills: 2 | Status: SHIPPED | OUTPATIENT
Start: 2023-07-20

## 2023-07-20 RX ORDER — INSULIN GLARGINE 100 [IU]/ML
20 INJECTION, SOLUTION SUBCUTANEOUS NIGHTLY
Qty: 15 ML | Refills: 2 | Status: SHIPPED | OUTPATIENT
Start: 2023-07-20

## 2023-07-20 RX ORDER — PEN NEEDLE, DIABETIC 32GX 5/32"
NEEDLE, DISPOSABLE MISCELLANEOUS
Qty: 400 EACH | Refills: 1 | Status: SHIPPED | OUTPATIENT
Start: 2023-07-20

## 2023-07-20 RX ORDER — BLOOD-GLUCOSE METER
1 EACH MISCELLANEOUS DAILY
Qty: 1 KIT | Refills: 0 | Status: SHIPPED | OUTPATIENT
Start: 2023-07-20

## 2023-07-20 NOTE — PATIENT INSTRUCTIONS
START Lantus 20 units subcutaneous bedtime    START Humalog 6 units subcutaneous 3 times per day before meals     SEND Blood glucose levels on Monday

## 2023-07-27 ENCOUNTER — OFFICE VISIT (OUTPATIENT)
Dept: OBGYN CLINIC | Facility: CLINIC | Age: 38
End: 2023-07-27

## 2023-07-27 VITALS
BODY MASS INDEX: 26 KG/M2 | DIASTOLIC BLOOD PRESSURE: 65 MMHG | SYSTOLIC BLOOD PRESSURE: 100 MMHG | WEIGHT: 147.63 LBS | HEART RATE: 75 BPM

## 2023-07-27 DIAGNOSIS — Z32.01 PREGNANCY EXAMINATION OR TEST, POSITIVE RESULT: Primary | ICD-10-CM

## 2023-07-27 PROCEDURE — 3078F DIAST BP <80 MM HG: CPT | Performed by: OBSTETRICS & GYNECOLOGY

## 2023-07-27 PROCEDURE — 99212 OFFICE O/P EST SF 10 MIN: CPT | Performed by: OBSTETRICS & GYNECOLOGY

## 2023-07-27 PROCEDURE — 3074F SYST BP LT 130 MM HG: CPT | Performed by: OBSTETRICS & GYNECOLOGY

## 2023-07-27 NOTE — PROGRESS NOTES
Preston Jerome is a 45year old female [id number] Patient's last menstrual period was 2023 (exact date). Patient presents with:  Gyn Exam: Pt reports positive urine test 23   Presenting for confirmation of pregnancy. LMP 23 making her 6 weeks today. Stopped taking taking OCP in February followed by regular monthly menses. Had positive pregnancy test at home and in office with Dr. Deborah Durand. OBSTETRICS HISTORY:  OB History     T3    L3    SAB1  IAB0  Ectopic0  Multiple0  Live Births3     GYNE HISTORY:  Patient's last menstrual period was 2023 (exact date).     Sexual activity:   Yes        Pap Date: 10/31/22  Pap Result Notes: LSIL / HPV+  Follow Up Recommendation: 10/31/22 EMB      MEDICAL HISTORY:  Past Medical History:   Diagnosis Date    Anxiety     Decorative tattoo     Diabetes ([facility] [address]     Human papilloma virus infection     No diabetic retinopathy in both eyes 2018         SURGICAL HISTORY:  Past Surgical History:   Procedure Laterality Date    COLPOSCOPY,BX CERVIX/ENDOCERV CURR         SOCIAL HISTORY:  Social History    Socioeconomic History      Marital status:       Spouse name: Not on file      Number of children: Not on file      Years of education: Not on file      Highest education level: Not on file    Occupational History      Not on file    Tobacco Use      Smoking status: Former        Types: Cigarettes      Smokeless tobacco: Never      Tobacco comments: Quit     Vaping Use      Vaping Use: Never used    Substance and Sexual Activity      Alcohol use: Not Currently      Drug use: No      Sexual activity: Yes    Other Topics      Concerns:        Caffeine Concern: No        Exercise: Yes        Seat Belt: Yes        Special Diet: No        Stress Concern: No        Weight Concern: No    Social History Narrative      ** Merged History Encounter **           Social Determinants of Health  Financial Resource Strain: Not on file  Food Insecurity: Not on file  Transportation Needs: Not on file  Physical Activity: Not on file  Stress: Not on file  Social Connections: Not on file  Housing Stability: Not on file        MEDICATIONS:    Current Outpatient Medications:     cephalexin 250 MG Oral Cap, Take 1 capsule (250 mg total) by mouth 4 (four) times daily. , Disp: 28 capsule, Rfl: 0    Glucose Blood (ONETOUCH VERIO) In Vitro Strip, Check 4 times per day, Disp: 400 strip, Rfl: 1    Lancets (ONETOUCH DELICA PLUS [id number]) Does not apply Misc, 1 Device in the morning, at noon, in the evening, and at bedtime. , Disp: 400 each, Rfl: 1    insulin glargine (LANTUS SOLOSTAR) 100 UNIT/ML Subcutaneous Solution Pen-injector, Inject 20 Units into the skin nightly., Disp: 15 mL, Rfl: 2    Insulin Lispro, 1 Unit Dial, (HUMALOG KWIKPEN) 100 UNIT/ML Subcutaneous Solution Pen-injector, Inject 30 units subcutaneous daily according to scale, Disp: 15 mL, Rfl: 2    Insulin Pen Needle (BD PEN NEEDLE BRIDGET U/F) 32G X 4 MM Does not apply Misc, Inject 4 times per day, Disp: 400 each, Rfl: 1    Continuous Blood Gluc Sensor (DEXCOM G7 SENSOR) Does not apply Misc, 1 Device Every 10 days. , Disp: 3 each, Rfl: 5    amoxicillin clavulanate 500-125 MG Oral Tab, Take 1 tablet by mouth in the morning and 1 tablet before bedtime. Do all this for 7 days. (Patient not taking: Reported on 7/27/2023), Disp: 14 tablet, Rfl: 0    Blood Glucose Monitoring Suppl ([employer]) w/Device Does not apply Kit, 1 Device daily. , Disp: 1 kit, Rfl: 0    Dulaglutide (TRULICITY) 1.5 WM/9.2YT Subcutaneous Solution Pen-injector, Inject 1.5 mg into the skin once a week. (Patient not taking: Reported on 7/20/2023), Disp: 12 each, Rfl: 1    Blood Glucose Monitoring Suppl ([employer]) w/Device Does not apply Kit, 1 Device daily. (Patient not taking: Reported on 7/20/2023), Disp: 1 kit, Rfl: 0    Lancets (Antonio Ramos) Does not apply Misc, 1 Device daily.  (Patient not taking: Reported on 7/20/2023), Disp: 100 each, Rfl: 1    ALLERGIES:  No Known Allergies      Review of Systems:  Review of Systems   All other systems reviewed and are negative.        07/27/23  1635   BP: 100/65   Pulse: 75       PHYSICAL EXAM:   Constitutional: well developed, well nourished  Head/Face: normocephalic  Skin/Hair: no unusual rashes or bruises  Extremities: no edema, no cyanosis  Psychiatric:  Oriented to time, place, person and situation. Appropriate mood and affect      Assessment & Plan:  Jojo Zamora was seen today for gyn exam.    Diagnoses and all orders for this visit:    Pregnancy examination or test, positive result        Requested Prescriptions      No prescriptions requested or ordered in this encounter       Return for initial OB nurse visit.

## 2023-08-02 ENCOUNTER — PATIENT MESSAGE (OUTPATIENT)
Dept: OBGYN CLINIC | Facility: CLINIC | Age: 38
End: 2023-08-02

## 2023-08-02 DIAGNOSIS — O26.859 SPOTTING IN PREGNANCY: Primary | ICD-10-CM

## 2023-08-03 ENCOUNTER — APPOINTMENT (OUTPATIENT)
Dept: ULTRASOUND IMAGING | Facility: HOSPITAL | Age: 38
End: 2023-08-03
Attending: EMERGENCY MEDICINE
Payer: MEDICAID

## 2023-08-03 ENCOUNTER — HOSPITAL ENCOUNTER (EMERGENCY)
Facility: HOSPITAL | Age: 38
Discharge: HOME | End: 2023-08-03
Attending: EMERGENCY MEDICINE
Payer: MEDICAID

## 2023-08-03 VITALS
SYSTOLIC BLOOD PRESSURE: 118 MMHG | TEMPERATURE: 99 F | RESPIRATION RATE: 18 BRPM | OXYGEN SATURATION: 99 % | DIASTOLIC BLOOD PRESSURE: 72 MMHG | HEIGHT: 62 IN | HEART RATE: 70 BPM | BODY MASS INDEX: 27.05 KG/M2 | WEIGHT: 147 LBS

## 2023-08-03 DIAGNOSIS — O20.9 VAGINAL BLEEDING AFFECTING EARLY PREGNANCY: Primary | ICD-10-CM

## 2023-08-03 LAB
ANION GAP SERPL CALC-SCNC: 5 MMOL/L (ref 0–18)
B-HCG SERPL-ACNC: 3859 MIU/ML
BASOPHILS # BLD AUTO: 0.03 X10(3) UL (ref 0–0.2)
BASOPHILS NFR BLD AUTO: 0.4 %
BUN BLD-MCNC: 11 MG/DL (ref 7–18)
BUN/CREAT SERPL: 17.2 (ref 10–20)
CALCIUM BLD-MCNC: 8.8 MG/DL (ref 8.5–10.1)
CHLORIDE SERPL-SCNC: 104 MMOL/L (ref 98–112)
CO2 SERPL-SCNC: 27 MMOL/L (ref 21–32)
CREAT BLD-MCNC: 0.64 MG/DL
DEPRECATED RDW RBC AUTO: 39.9 FL (ref 35.1–46.3)
EGFRCR SERPLBLD CKD-EPI 2021: 116 ML/MIN/1.73M2 (ref 60–?)
EOSINOPHIL # BLD AUTO: 0.05 X10(3) UL (ref 0–0.7)
EOSINOPHIL NFR BLD AUTO: 0.6 %
ERYTHROCYTE [DISTWIDTH] IN BLOOD BY AUTOMATED COUNT: 13.2 % (ref 11–15)
GLUCOSE BLD-MCNC: 155 MG/DL (ref 70–99)
HCT VFR BLD AUTO: 39.1 %
HGB BLD-MCNC: 12.6 G/DL
IMM GRANULOCYTES # BLD AUTO: 0.03 X10(3) UL (ref 0–1)
IMM GRANULOCYTES NFR BLD: 0.4 %
LYMPHOCYTES # BLD AUTO: 2.29 X10(3) UL (ref 1–4)
LYMPHOCYTES NFR BLD AUTO: 27.7 %
MCH RBC QN AUTO: 26.6 PG (ref 26–34)
MCHC RBC AUTO-ENTMCNC: 32.2 G/DL (ref 31–37)
MCV RBC AUTO: 82.7 FL
MONOCYTES # BLD AUTO: 0.48 X10(3) UL (ref 0.1–1)
MONOCYTES NFR BLD AUTO: 5.8 %
NEUTROPHILS # BLD AUTO: 5.39 X10 (3) UL (ref 1.5–7.7)
NEUTROPHILS # BLD AUTO: 5.39 X10(3) UL (ref 1.5–7.7)
NEUTROPHILS NFR BLD AUTO: 65.1 %
OSMOLALITY SERPL CALC.SUM OF ELEC: 285 MOSM/KG (ref 275–295)
PLATELET # BLD AUTO: 339 10(3)UL (ref 150–450)
POTASSIUM SERPL-SCNC: 4.3 MMOL/L (ref 3.5–5.1)
RBC # BLD AUTO: 4.73 X10(6)UL
RH BLOOD TYPE: POSITIVE
SODIUM SERPL-SCNC: 136 MMOL/L (ref 136–145)
WBC # BLD AUTO: 8.3 X10(3) UL (ref 4–11)

## 2023-08-03 PROCEDURE — 76817 TRANSVAGINAL US OBSTETRIC: CPT | Performed by: EMERGENCY MEDICINE

## 2023-08-03 PROCEDURE — 86900 BLOOD TYPING SEROLOGIC ABO: CPT | Performed by: EMERGENCY MEDICINE

## 2023-08-03 PROCEDURE — 86901 BLOOD TYPING SEROLOGIC RH(D): CPT | Performed by: EMERGENCY MEDICINE

## 2023-08-03 PROCEDURE — 80048 BASIC METABOLIC PNL TOTAL CA: CPT | Performed by: EMERGENCY MEDICINE

## 2023-08-03 PROCEDURE — 85025 COMPLETE CBC W/AUTO DIFF WBC: CPT | Performed by: EMERGENCY MEDICINE

## 2023-08-03 PROCEDURE — 76801 OB US < 14 WKS SINGLE FETUS: CPT | Performed by: EMERGENCY MEDICINE

## 2023-08-03 PROCEDURE — 84702 CHORIONIC GONADOTROPIN TEST: CPT | Performed by: EMERGENCY MEDICINE

## 2023-08-03 PROCEDURE — 36415 COLL VENOUS BLD VENIPUNCTURE: CPT

## 2023-08-03 PROCEDURE — 99284 EMERGENCY DEPT VISIT MOD MDM: CPT

## 2023-08-03 NOTE — ED QUICK NOTES
Pt  is 7 wks gestation, .  has had vaginal bleeding x 3 days and lower abd pain.  pain feels like when she miscarried before.

## 2023-08-03 NOTE — ED INITIAL ASSESSMENT (HPI)
Pt ambulatory to ED A&O x 4 w/ c/o: Pregnancy w/ vaginal bleeding x 3 days. Pt reporting she is approx 7 weeks. Bleeding originally started as brown spotting, progressed to red/pink color now.  (+) lower abdominal cramping.  , LMP 23.

## 2023-08-04 NOTE — TELEPHONE ENCOUNTER
Pt took herself to ER afternoon of 8/3 for pink/red spotting and abdominal pain. Quant 3,859 in ER. 1st Tri US showing 6w3d IUP. MC sent to pt to keep OBN. Call with any changes. To JLK for sign off.

## 2023-08-07 ENCOUNTER — HOSPITAL ENCOUNTER (EMERGENCY)
Facility: HOSPITAL | Age: 38
Discharge: HOME | End: 2023-08-07
Attending: EMERGENCY MEDICINE
Payer: MEDICAID

## 2023-08-07 ENCOUNTER — APPOINTMENT (OUTPATIENT)
Dept: ULTRASOUND IMAGING | Facility: HOSPITAL | Age: 38
End: 2023-08-07
Attending: EMERGENCY MEDICINE
Payer: MEDICAID

## 2023-08-07 ENCOUNTER — TELEPHONE (OUTPATIENT)
Dept: OBGYN CLINIC | Facility: CLINIC | Age: 38
End: 2023-08-07

## 2023-08-07 VITALS
TEMPERATURE: 98 F | DIASTOLIC BLOOD PRESSURE: 78 MMHG | RESPIRATION RATE: 18 BRPM | BODY MASS INDEX: 27 KG/M2 | OXYGEN SATURATION: 98 % | HEART RATE: 85 BPM | WEIGHT: 147.69 LBS | SYSTOLIC BLOOD PRESSURE: 119 MMHG

## 2023-08-07 VITALS
OXYGEN SATURATION: 99 % | DIASTOLIC BLOOD PRESSURE: 77 MMHG | RESPIRATION RATE: 18 BRPM | SYSTOLIC BLOOD PRESSURE: 104 MMHG | TEMPERATURE: 98 F | HEART RATE: 72 BPM | WEIGHT: 147.69 LBS | BODY MASS INDEX: 27 KG/M2

## 2023-08-07 DIAGNOSIS — O03.9 MISCARRIAGE: Primary | ICD-10-CM

## 2023-08-07 DIAGNOSIS — O20.0 THREATENED MISCARRIAGE: Primary | ICD-10-CM

## 2023-08-07 LAB
ANION GAP SERPL CALC-SCNC: 6 MMOL/L (ref 0–18)
ANION GAP SERPL CALC-SCNC: 6 MMOL/L (ref 0–18)
B-HCG SERPL-ACNC: 3252 MIU/ML
B-HCG SERPL-ACNC: 3481 MIU/ML
BASOPHILS # BLD AUTO: 0.03 X10(3) UL (ref 0–0.2)
BASOPHILS # BLD AUTO: 0.03 X10(3) UL (ref 0–0.2)
BASOPHILS NFR BLD AUTO: 0.3 %
BASOPHILS NFR BLD AUTO: 0.3 %
BUN BLD-MCNC: 12 MG/DL (ref 7–18)
BUN BLD-MCNC: 9 MG/DL (ref 7–18)
BUN/CREAT SERPL: 14.1 (ref 10–20)
BUN/CREAT SERPL: 19.4 (ref 10–20)
CALCIUM BLD-MCNC: 8.4 MG/DL (ref 8.5–10.1)
CALCIUM BLD-MCNC: 8.9 MG/DL (ref 8.5–10.1)
CHLORIDE SERPL-SCNC: 105 MMOL/L (ref 98–112)
CHLORIDE SERPL-SCNC: 105 MMOL/L (ref 98–112)
CO2 SERPL-SCNC: 25 MMOL/L (ref 21–32)
CO2 SERPL-SCNC: 27 MMOL/L (ref 21–32)
CREAT BLD-MCNC: 0.62 MG/DL
CREAT BLD-MCNC: 0.64 MG/DL
DEPRECATED RDW RBC AUTO: 38.6 FL (ref 35.1–46.3)
DEPRECATED RDW RBC AUTO: 38.8 FL (ref 35.1–46.3)
EGFRCR SERPLBLD CKD-EPI 2021: 116 ML/MIN/1.73M2 (ref 60–?)
EGFRCR SERPLBLD CKD-EPI 2021: 117 ML/MIN/1.73M2 (ref 60–?)
EOSINOPHIL # BLD AUTO: 0.08 X10(3) UL (ref 0–0.7)
EOSINOPHIL # BLD AUTO: 0.08 X10(3) UL (ref 0–0.7)
EOSINOPHIL NFR BLD AUTO: 0.8 %
EOSINOPHIL NFR BLD AUTO: 0.8 %
ERYTHROCYTE [DISTWIDTH] IN BLOOD BY AUTOMATED COUNT: 12.9 % (ref 11–15)
ERYTHROCYTE [DISTWIDTH] IN BLOOD BY AUTOMATED COUNT: 12.9 % (ref 11–15)
GLUCOSE BLD-MCNC: 193 MG/DL (ref 70–99)
GLUCOSE BLD-MCNC: 246 MG/DL (ref 70–99)
HCT VFR BLD AUTO: 35.9 %
HCT VFR BLD AUTO: 39.5 %
HGB BLD-MCNC: 11.9 G/DL
HGB BLD-MCNC: 12.7 G/DL
IMM GRANULOCYTES # BLD AUTO: 0.04 X10(3) UL (ref 0–1)
IMM GRANULOCYTES # BLD AUTO: 0.06 X10(3) UL (ref 0–1)
IMM GRANULOCYTES NFR BLD: 0.4 %
IMM GRANULOCYTES NFR BLD: 0.6 %
LYMPHOCYTES # BLD AUTO: 2.26 X10(3) UL (ref 1–4)
LYMPHOCYTES # BLD AUTO: 2.7 X10(3) UL (ref 1–4)
LYMPHOCYTES NFR BLD AUTO: 23.2 %
LYMPHOCYTES NFR BLD AUTO: 26.4 %
MCH RBC QN AUTO: 26.3 PG (ref 26–34)
MCH RBC QN AUTO: 27.1 PG (ref 26–34)
MCHC RBC AUTO-ENTMCNC: 32.2 G/DL (ref 31–37)
MCHC RBC AUTO-ENTMCNC: 33.1 G/DL (ref 31–37)
MCV RBC AUTO: 81.8 FL
MCV RBC AUTO: 81.8 FL
MONOCYTES # BLD AUTO: 0.39 X10(3) UL (ref 0.1–1)
MONOCYTES # BLD AUTO: 0.54 X10(3) UL (ref 0.1–1)
MONOCYTES NFR BLD AUTO: 4 %
MONOCYTES NFR BLD AUTO: 5.3 %
NEUTROPHILS # BLD AUTO: 6.8 X10 (3) UL (ref 1.5–7.7)
NEUTROPHILS # BLD AUTO: 6.8 X10(3) UL (ref 1.5–7.7)
NEUTROPHILS # BLD AUTO: 6.95 X10 (3) UL (ref 1.5–7.7)
NEUTROPHILS # BLD AUTO: 6.95 X10(3) UL (ref 1.5–7.7)
NEUTROPHILS NFR BLD AUTO: 66.6 %
NEUTROPHILS NFR BLD AUTO: 71.3 %
OSMOLALITY SERPL CALC.SUM OF ELEC: 290 MOSM/KG (ref 275–295)
OSMOLALITY SERPL CALC.SUM OF ELEC: 290 MOSM/KG (ref 275–295)
PLATELET # BLD AUTO: 327 10(3)UL (ref 150–450)
PLATELET # BLD AUTO: 360 10(3)UL (ref 150–450)
POTASSIUM SERPL-SCNC: 4.1 MMOL/L (ref 3.5–5.1)
POTASSIUM SERPL-SCNC: 4.2 MMOL/L (ref 3.5–5.1)
RBC # BLD AUTO: 4.39 X10(6)UL
RBC # BLD AUTO: 4.83 X10(6)UL
SODIUM SERPL-SCNC: 136 MMOL/L (ref 136–145)
SODIUM SERPL-SCNC: 138 MMOL/L (ref 136–145)
WBC # BLD AUTO: 10.2 X10(3) UL (ref 4–11)
WBC # BLD AUTO: 9.8 X10(3) UL (ref 4–11)

## 2023-08-07 PROCEDURE — 85025 COMPLETE CBC W/AUTO DIFF WBC: CPT | Performed by: EMERGENCY MEDICINE

## 2023-08-07 PROCEDURE — 99284 EMERGENCY DEPT VISIT MOD MDM: CPT

## 2023-08-07 PROCEDURE — 76801 OB US < 14 WKS SINGLE FETUS: CPT | Performed by: EMERGENCY MEDICINE

## 2023-08-07 PROCEDURE — 99283 EMERGENCY DEPT VISIT LOW MDM: CPT

## 2023-08-07 PROCEDURE — 84702 CHORIONIC GONADOTROPIN TEST: CPT | Performed by: EMERGENCY MEDICINE

## 2023-08-07 PROCEDURE — 80048 BASIC METABOLIC PNL TOTAL CA: CPT | Performed by: EMERGENCY MEDICINE

## 2023-08-07 PROCEDURE — 36415 COLL VENOUS BLD VENIPUNCTURE: CPT

## 2023-08-07 NOTE — ED INITIAL ASSESSMENT (HPI)
Pt from home to ED via triage for evaluation of vaginal bleeding while pregnant. Pt states that she is 8 weeks pregnant, seen in this ED on Thursday for similar issue. Pt states that bleeding has increased since Thursday.

## 2023-08-07 NOTE — ED INITIAL ASSESSMENT (HPI)
C/o abdominal pain, seen today diagnosed with threatened miscarriage. 8 weeks pregnant. Concerned for bleeding.

## 2023-08-07 NOTE — TELEPHONE ENCOUNTER
Pt was seen today, 8/7/23 for miscarriage.  Booked pt for ER f/u appt with EMB for tomorrow, 8/8 at 10:40am.

## 2023-08-07 NOTE — TELEPHONE ENCOUNTER
Pt had miscarriage had appt on 8/8, but it got cancelled looking for another appt on 8/8.     Pls advise

## 2023-08-08 ENCOUNTER — OFFICE VISIT (OUTPATIENT)
Dept: OBGYN CLINIC | Facility: CLINIC | Age: 38
End: 2023-08-08

## 2023-08-08 VITALS
WEIGHT: 148 LBS | SYSTOLIC BLOOD PRESSURE: 104 MMHG | HEART RATE: 75 BPM | BODY MASS INDEX: 27 KG/M2 | DIASTOLIC BLOOD PRESSURE: 72 MMHG

## 2023-08-08 DIAGNOSIS — O03.9 SPONTANEOUS MISCARRIAGE: Primary | ICD-10-CM

## 2023-08-08 PROCEDURE — 99214 OFFICE O/P EST MOD 30 MIN: CPT | Performed by: NURSE PRACTITIONER

## 2023-08-08 PROCEDURE — 3074F SYST BP LT 130 MM HG: CPT | Performed by: NURSE PRACTITIONER

## 2023-08-08 PROCEDURE — 3078F DIAST BP <80 MM HG: CPT | Performed by: NURSE PRACTITIONER

## 2023-08-15 ENCOUNTER — LAB ENCOUNTER (OUTPATIENT)
Dept: LAB | Facility: HOSPITAL | Age: 38
End: 2023-08-15
Attending: NURSE PRACTITIONER
Payer: MEDICAID

## 2023-08-15 DIAGNOSIS — O03.9 SPONTANEOUS MISCARRIAGE: ICD-10-CM

## 2023-08-15 LAB — B-HCG SERPL-ACNC: 23 MIU/ML

## 2023-08-15 PROCEDURE — 36415 COLL VENOUS BLD VENIPUNCTURE: CPT

## 2023-08-15 PROCEDURE — 84702 CHORIONIC GONADOTROPIN TEST: CPT

## 2023-08-18 ENCOUNTER — OFFICE VISIT (OUTPATIENT)
Dept: ENDOCRINOLOGY CLINIC | Facility: CLINIC | Age: 38
End: 2023-08-18

## 2023-08-18 VITALS
WEIGHT: 146 LBS | HEART RATE: 70 BPM | DIASTOLIC BLOOD PRESSURE: 69 MMHG | BODY MASS INDEX: 27 KG/M2 | SYSTOLIC BLOOD PRESSURE: 92 MMHG

## 2023-08-18 DIAGNOSIS — E11.65 UNCONTROLLED TYPE 2 DIABETES MELLITUS WITH HYPERGLYCEMIA (HCC): Primary | ICD-10-CM

## 2023-08-18 LAB
CARTRIDGE LOT#: ABNORMAL NUMERIC
GLUCOSE BLOOD: 154
HEMOGLOBIN A1C: 7.5 % (ref 4.3–5.6)
TEST STRIP LOT #: NORMAL NUMERIC

## 2023-08-18 PROCEDURE — 83036 HEMOGLOBIN GLYCOSYLATED A1C: CPT | Performed by: INTERNAL MEDICINE

## 2023-08-18 PROCEDURE — 99214 OFFICE O/P EST MOD 30 MIN: CPT | Performed by: INTERNAL MEDICINE

## 2023-08-18 PROCEDURE — 3051F HG A1C>EQUAL 7.0%<8.0%: CPT | Performed by: INTERNAL MEDICINE

## 2023-08-18 PROCEDURE — 82947 ASSAY GLUCOSE BLOOD QUANT: CPT | Performed by: INTERNAL MEDICINE

## 2023-08-18 PROCEDURE — 3074F SYST BP LT 130 MM HG: CPT | Performed by: INTERNAL MEDICINE

## 2023-08-18 PROCEDURE — 3078F DIAST BP <80 MM HG: CPT | Performed by: INTERNAL MEDICINE

## 2023-08-18 RX ORDER — DULAGLUTIDE 1.5 MG/.5ML
1.5 INJECTION, SOLUTION SUBCUTANEOUS WEEKLY
Qty: 12 EACH | Refills: 1 | Status: SHIPPED | OUTPATIENT
Start: 2023-08-18

## 2023-08-18 RX ORDER — GLIMEPIRIDE 4 MG/1
4 TABLET ORAL
COMMUNITY

## 2023-08-18 NOTE — PATIENT INSTRUCTIONS
Humalog 6 units subcutaneous if need for BG level above 200     CONTINUE Trulicity     CONTINUE Glimepiride     IF pregnant again    Lantus 20 units subcutaneous bedtime    Humalog 6 units subcutaneous 3 times per day

## 2023-11-16 ENCOUNTER — OFFICE VISIT (OUTPATIENT)
Dept: INTERNAL MEDICINE CLINIC | Facility: CLINIC | Age: 38
End: 2023-11-16
Payer: MEDICAID

## 2023-11-16 VITALS — SYSTOLIC BLOOD PRESSURE: 110 MMHG | BODY MASS INDEX: 27 KG/M2 | DIASTOLIC BLOOD PRESSURE: 60 MMHG | WEIGHT: 148 LBS

## 2023-11-16 DIAGNOSIS — M54.9 UPPER BACK PAIN ON RIGHT SIDE: Primary | ICD-10-CM

## 2023-11-16 DIAGNOSIS — E11.65 TYPE 2 DIABETES MELLITUS WITH HYPERGLYCEMIA, WITHOUT LONG-TERM CURRENT USE OF INSULIN (HCC): ICD-10-CM

## 2023-11-16 PROCEDURE — 3074F SYST BP LT 130 MM HG: CPT | Performed by: INTERNAL MEDICINE

## 2023-11-16 PROCEDURE — 99214 OFFICE O/P EST MOD 30 MIN: CPT | Performed by: INTERNAL MEDICINE

## 2023-11-16 PROCEDURE — 3078F DIAST BP <80 MM HG: CPT | Performed by: INTERNAL MEDICINE

## 2023-11-16 RX ORDER — CYCLOBENZAPRINE HCL 10 MG
10 TABLET ORAL 3 TIMES DAILY
Qty: 30 TABLET | Refills: 1 | Status: SHIPPED | OUTPATIENT
Start: 2023-11-16 | End: 2023-12-06

## 2023-11-29 ENCOUNTER — PATIENT MESSAGE (OUTPATIENT)
Dept: INTERNAL MEDICINE CLINIC | Facility: CLINIC | Age: 38
End: 2023-11-29

## 2023-11-29 DIAGNOSIS — E11.65 TYPE 2 DIABETES MELLITUS WITH HYPERGLYCEMIA, WITHOUT LONG-TERM CURRENT USE OF INSULIN (HCC): Primary | ICD-10-CM

## 2023-11-30 NOTE — TELEPHONE ENCOUNTER
From: Colin Dillon  To: Erickaearnestjason Solis  Sent: 11/29/2023 3:52 PM CST  Subject: [place] Dr. Magdi Ramírez. Can you refer me to a eye doctor that takes my insurance. The one you gave me, Dr Nina Liu take the type of insurance I have.  Thank you

## 2023-12-08 ENCOUNTER — OFFICE VISIT (OUTPATIENT)
Dept: ENDOCRINOLOGY CLINIC | Facility: CLINIC | Age: 38
End: 2023-12-08

## 2023-12-08 VITALS
SYSTOLIC BLOOD PRESSURE: 123 MMHG | WEIGHT: 153 LBS | HEART RATE: 84 BPM | BODY MASS INDEX: 28 KG/M2 | DIASTOLIC BLOOD PRESSURE: 85 MMHG

## 2023-12-08 DIAGNOSIS — E11.65 UNCONTROLLED TYPE 2 DIABETES MELLITUS WITH HYPERGLYCEMIA (HCC): Primary | ICD-10-CM

## 2023-12-08 LAB
CARTRIDGE LOT#: ABNORMAL NUMERIC
GLUCOSE BLOOD: 219
HEMOGLOBIN A1C: 8.2 % (ref 4.3–5.6)
TEST STRIP LOT #: NORMAL NUMERIC

## 2023-12-08 PROCEDURE — 3052F HG A1C>EQUAL 8.0%<EQUAL 9.0%: CPT | Performed by: INTERNAL MEDICINE

## 2023-12-08 PROCEDURE — 3074F SYST BP LT 130 MM HG: CPT | Performed by: INTERNAL MEDICINE

## 2023-12-08 PROCEDURE — 99214 OFFICE O/P EST MOD 30 MIN: CPT | Performed by: INTERNAL MEDICINE

## 2023-12-08 PROCEDURE — 82947 ASSAY GLUCOSE BLOOD QUANT: CPT | Performed by: INTERNAL MEDICINE

## 2023-12-08 PROCEDURE — 3079F DIAST BP 80-89 MM HG: CPT | Performed by: INTERNAL MEDICINE

## 2023-12-08 PROCEDURE — 83036 HEMOGLOBIN GLYCOSYLATED A1C: CPT | Performed by: INTERNAL MEDICINE

## 2023-12-08 RX ORDER — DULAGLUTIDE 3 MG/.5ML
3 INJECTION, SOLUTION SUBCUTANEOUS WEEKLY
Qty: 2 ML | Refills: 0 | Status: SHIPPED | OUTPATIENT
Start: 2023-12-08

## 2023-12-08 RX ORDER — DULAGLUTIDE 4.5 MG/.5ML
4.5 INJECTION, SOLUTION SUBCUTANEOUS WEEKLY
Qty: 2 ML | Refills: 3 | Status: SHIPPED | OUTPATIENT
Start: 2024-01-05

## 2023-12-08 NOTE — PATIENT INSTRUCTIONS
CONTINUE Glimepiride    START Farxiga 10mg daily     INCREASE Trulicity to 3mg subcutaneous weekly for one month then increase to 4.5mg subcutaneous weekly

## 2023-12-12 ENCOUNTER — PATIENT MESSAGE (OUTPATIENT)
Dept: ENDOCRINOLOGY CLINIC | Facility: CLINIC | Age: 38
End: 2023-12-12

## 2023-12-12 NOTE — TELEPHONE ENCOUNTER
From: Nara Hart  To: Arturo Hernandez  Sent: 2023 7:42 AM CST  Subject: Alyssa Grande    Good morning. The Alyssa Grande I told you I had at home is . Can you please send a new order over to my [facility]. Thanks.

## 2024-01-11 ENCOUNTER — OFFICE VISIT (OUTPATIENT)
Dept: OBGYN CLINIC | Facility: CLINIC | Age: 39
End: 2024-01-11

## 2024-01-11 VITALS
WEIGHT: 151 LBS | SYSTOLIC BLOOD PRESSURE: 120 MMHG | DIASTOLIC BLOOD PRESSURE: 78 MMHG | HEART RATE: 80 BPM | BODY MASS INDEX: 28 KG/M2

## 2024-01-11 DIAGNOSIS — Z12.4 SCREENING FOR CERVICAL CANCER: ICD-10-CM

## 2024-01-11 DIAGNOSIS — N94.10 DYSPAREUNIA IN FEMALE: ICD-10-CM

## 2024-01-11 DIAGNOSIS — R10.31 CHRONIC RLQ PAIN: ICD-10-CM

## 2024-01-11 DIAGNOSIS — Z01.419 WELL WOMAN EXAM WITH ROUTINE GYNECOLOGICAL EXAM: Primary | ICD-10-CM

## 2024-01-11 DIAGNOSIS — G89.29 CHRONIC RLQ PAIN: ICD-10-CM

## 2024-01-11 PROCEDURE — 3078F DIAST BP <80 MM HG: CPT | Performed by: NURSE PRACTITIONER

## 2024-01-11 PROCEDURE — 99395 PREV VISIT EST AGE 18-39: CPT | Performed by: NURSE PRACTITIONER

## 2024-01-11 PROCEDURE — 3074F SYST BP LT 130 MM HG: CPT | Performed by: NURSE PRACTITIONER

## 2024-01-11 NOTE — PROGRESS NOTES
[facility]    Obstetrics and Gynecology    Chief Complaint   Patient presents with    Gyn Exam     Annual // Occasional sharp pain - left breast  // Pain during IC // Nausea when eating and nightly per pt        Collette Venegas is a 38 year old female  Patient's last menstrual period was 2023 (exact date). presenting for annual gynecology exam.  Cycles regular since miscarriage in August. Menses monthly, normal flow, no pain.    States notices a sharp pain for a few seconds in left breast, no redness, no nipple discharge, symptoms not related to movement.  States very rare in a month, not often. Normal exam, reviewed to monitor    She also reports right pelvic pain happens when changes positon, has had for years. Feels when sleeping and moves to different side.    Pain with intercourse every time and position doesn't matter. Some constipation - she is on medications for her diabetes. Also noticing nausea - rev'd could be her medications and to discuss [facility] pcp/endocrine  Not using contraception. Declines contraception. Rev'd risks with diabetes in pregnanc    Pap:10/2022 LSIL, HPV +; colpo 2022 benign  Contraception:none  Mammo:  normal      OBSTETRICS HISTORY:  OB History    Para Term  AB Living   6 3 3 0 3 3   SAB IAB Ectopic Multiple Live Births   2 0 0 0 3       GYNE HISTORY:  Menarche: 11 (2024  3:16 PM)  Period Cycle (Days): Monthly (2024  3:16 PM)  Period Duration (Days): 6 days (2024  3:16 PM)  Period Flow: Heavy the first 3 days , goes away on the 4th day and 5th and 6th days are light (2024  3:16 PM)  Use of Birth Control (if yes, specify type): None (2024  3:16 PM)  Pap Date: 10/31/22 (2024  3:16 PM)  Pap Result Notes: LSIL / HPV+ // COLPO 22 low grade dysplasia (2024  3:16 PM)  Follow Up Recommendation: 10/31/22 EMB (2024  3:16 PM)      History   Sexual Activity    Sexual activity: Yes           Latest Ref Rng & Units  10/31/2022     9:43 AM 9/12/2020    10:16 AM 12/14/2016     3:10 PM   RECENT PAP RESULTS   Thinprep Pap Negative for intraepithelial lesion or malignancy Low grade squamous intraepithelial lesion (LSIL) HPV/Mild dysplasia/LAUREN I  Negative for intraepithelial lesion or malignancy - Positive for HPV  Negative for intraepithelial lesion or malignancy    HPV Negative Positive  Positive           MEDICAL HISTORY:  Past Medical History:   Diagnosis Date    Anxiety     Decorative tattoo     Diabetes ([facility])     Human papilloma virus infection     No diabetic retinopathy in both eyes 03/29/2018     Past Surgical History:   Procedure Laterality Date    COLPOSCOPY,BX CERVIX/ENDOCERV CURR         SOCIAL HISTORY:  Social History     Socioeconomic History    Marital status:      Spouse name: Not on file    Number of children: Not on file    Years of education: Not on file    Highest education level: Not on file   Occupational History    Not on file   Tobacco Use    Smoking status: Former     Types: Cigarettes    Smokeless tobacco: Never    Tobacco comments:     Quit 2011   Vaping Use    Vaping Use: Never used   Substance and Sexual Activity    Alcohol use: Not Currently    Drug use: No    Sexual activity: Yes   Other Topics Concern    Caffeine Concern No    Exercise Yes    Seat Belt Yes    Special Diet No    Stress Concern No    Weight Concern No   Social History Narrative    ** Merged History Encounter **          Social Determinants of Health     Financial Resource Strain: Not on file   Food Insecurity: Not on file   Transportation Needs: Not on file   Physical Activity: Not on file   Stress: Not on file   Social Connections: Not on file   Housing Stability: Not on file         Depression Screening (PHQ-2/PHQ-9): Over the LAST 2 WEEKS   Little interest or pleasure in doing things: Not at all    Feeling down, depressed, or hopeless: Not at all    PHQ-2 SCORE: 0           FAMILY HISTORY:  Family History   Problem Relation  Age of Onset    Diabetes Father     No Known Problems Mother     No Known Problems Son     No Known Problems Son     No Known Problems Son     Diabetes Other         paternal side       MEDICATIONS:    Current Outpatient Medications:     dapagliflozin (FARXIGA) 10 MG Oral Tab, Take 1 tablet (10 mg total) by mouth daily., Disp: 90 tablet, Rfl: 0    Dulaglutide (TRULICITY) 3 MG/0.5ML Subcutaneous Solution Pen-injector, Inject 3 mg into the skin once a week., Disp: 2 mL, Rfl: 0    Dulaglutide (TRULICITY) 4.5 MG/0.5ML Subcutaneous Solution Pen-injector, Inject 4.5 mg into the skin once a week., Disp: 2 mL, Rfl: 3    glimepiride 4 MG Oral Tab, Take 1 tablet (4 mg total) by mouth every morning before breakfast., Disp: , Rfl:     Glucose Blood (ONETOUCH VERIO) In Vitro Strip, Check 4 times per day, Disp: 400 strip, Rfl: 1    Lancets (ONETOUCH DELICA PLUS [id number]) Does not apply Misc, 1 Device in the morning, at noon, in the evening, and at bedtime., Disp: 400 each, Rfl: 1    Insulin Pen Needle (BD PEN NEEDLE BRIDGET U/F) 32G X 4 MM Does not apply Misc, Inject 4 times per day, Disp: 400 each, Rfl: 1    Continuous Blood Gluc Sensor (DEXCOM G7 SENSOR) Does not apply Misc, 1 Device Every 10 days., Disp: 3 each, Rfl: 5    Blood Glucose Monitoring Suppl (ONETOUCH VERIO) w/Device Does not apply Kit, 1 Device daily., Disp: 1 kit, Rfl: 0    Lancets (ONETOUCH DELICA PLUS [id number]) Does not apply Misc, 1 Device daily., Disp: 100 each, Rfl: 1    ALLERGIES:  No Known Allergies      Review of Systems:  Constitutional:  Denies fatigue, night sweats, hot flashes  Eyes:  denies blurred or double vision  Cardiovascular:  denies chest pain or palpitations  Respiratory:  denies shortness of breath  Gastrointestinal:  denies heartburn, abdominal pain, diarrhea or constipation  Genitourinary:  denies dysuria, incontinence, abnormal vaginal discharge, vaginal itching, see hPi  Musculoskeletal:  denies back pain   Skin/Breast:  Denies any breast  pain, lumps, or discharge.   Neurological:  denies headaches, extremity weakness or numbness.  Psychiatric: denies depression or anxiety.  Endocrine:   denies excessive thirst or urination.  Heme/Lymph:  denies history of anemia, easy bruising or bleeding.      PHYSICAL EXAM:     Vitals:    01/11/24 1518   BP: 120/78   Pulse: 80   Weight: 151 lb (68.5 kg)       Body mass index is 27.62 kg/m².     Constitutional: well developed, well nourished  Psychiatric:  Oriented to time, place, person and situation. Appropriate mood and affect  Head/Face: normocephalic  Neck/Thyroid: thyroid symmetric, no thyromegaly, no nodules, no adenopathy  Lymphatic:no abnormal supraclavicular or axillary adenopathy is noted  Breast: normal without palpable masses, tenderness, asymmetry, nipple discharge, nipple retraction or skin changes  Abdomen:  soft, nontender, nondistended, no masses  Skin/Hair: no unusual rashes or bruises  Extremities: no edema, no cyanosis    Pelvic Exam:  External Genitalia: normal appearance, hair distribution, and no lesions  Urethral Meatus:  normal in size, location, without lesions and prolapse  Bladder:  No fullness, masses or tenderness  Vagina:  Normal appearance without lesions, no abnormal discharge  Cervix:  Normal without tenderness on motion  Uterus: normal in size, contour, position, mobility, without tenderness  Adnexa: normal without masses or tenderness  Perineum: normal  Anus: no hemorroids     Assessment & Plan:    ICD-10-CM    1. Well woman exam with routine gynecological exam  Z01.419       2. Screening for cervical cancer  Z12.4 ThinPrep PAP Smear     Hpv Dna  High Risk , Thin Prep Collect      3. Dyspareunia in female  N94.10 US PELVIS W EV (CPT=76856/00081)      4. Chronic RLQ pain  R10.31 US PELVIS W EV (CPT=76856/86364)    G89.29          Reviewed ASCCP guidelines with the patient   Pap with HPV today  Contraception: Using none --Encouraged condoms to prevent STD exposure  Pelvic pain,  dyspareunia - pelvic US, if normal consider pelvic floor physical therapy  Breast Health:     Reviewed current guidelines with the patient and to start Mammograms at age 40  Reviewed monthly self breast exams with the patient   Discussed diet, exercise, MVIs with Ca/Vit D  Follow up in 1 yr for JOSELIN Gonzalez    This note was prepared using Dragon Medical voice recognition dictation software. As a result errors may occur. When identified these errors have been corrected. While every attempt is made to correct errors during dictation discrepancies may still exist.

## 2024-01-12 LAB — HPV I/H RISK 1 DNA SPEC QL NAA+PROBE: POSITIVE

## 2024-01-16 LAB
HPV16 DNA CVX QL PROBE+SIG AMP: POSITIVE
HPV18 DNA CVX QL PROBE+SIG AMP: NEGATIVE

## 2024-01-17 ENCOUNTER — PATIENT MESSAGE (OUTPATIENT)
Dept: OBGYN CLINIC | Facility: CLINIC | Age: 39
End: 2024-01-17

## 2024-01-17 DIAGNOSIS — Z32.00 PREGNANCY EXAMINATION OR TEST, PREGNANCY UNCONFIRMED: Primary | ICD-10-CM

## 2024-01-17 DIAGNOSIS — E11.65 TYPE 2 DIABETES MELLITUS WITH HYPERGLYCEMIA, WITHOUT LONG-TERM CURRENT USE OF INSULIN (HCC): Primary | ICD-10-CM

## 2024-01-17 NOTE — TELEPHONE ENCOUNTER
Pt asking if HPV preventing her from getting pregnant? Advised I do not believe this is interfering with fertility, but can discuss with MD in office as well.   Pt asking if any impact on male partner? Advised most men do not have any symptoms of HPV. Very rarely men have been diagnosed with rectal, penile or throat cancer related to HPV, but extremely uncommon. Patient verbalized understanding.     Pt scheduled with WILLIAM 1/23.  Hcg on 1/22. Motrin 600 mg 1 hr prior to appt.  Cannot do while on menses.  Patient verbalized understanding.

## 2024-01-17 NOTE — TELEPHONE ENCOUNTER
Pap abnormal - HSIL and HPV 16+. Needs colposcopy, dr. Santana did her last colpo in 12/2022    Please inform and schedule

## 2024-01-17 NOTE — TELEPHONE ENCOUNTER
From: Collette Venegas  To: Theodora Velasco  Sent: 1/17/2024 12:19 PM CST  Subject: Pap smear results     Can someone call me to explain my results? Thank you

## 2024-02-23 ENCOUNTER — TELEPHONE (OUTPATIENT)
Dept: OBGYN CLINIC | Facility: CLINIC | Age: 39
End: 2024-02-23

## 2024-02-23 NOTE — TELEPHONE ENCOUNTER
Patient calling to reschedule todays procedure, please call to reschedule at [phone],thanks.

## 2024-02-23 NOTE — TELEPHONE ENCOUNTER
Pt needs to reschedule Colpo for today. Rescheduled for Monday with MINOO. Pt informed she needs to complete HCG the day before. Pt states understanding.

## 2024-02-26 ENCOUNTER — TELEPHONE (OUTPATIENT)
Dept: OBGYN CLINIC | Facility: CLINIC | Age: 39
End: 2024-02-26

## 2024-02-26 NOTE — TELEPHONE ENCOUNTER
Pt called, states she needs to reschedule Colpo. Pt accepts appt with WILLIAM on 3/11. Aware of need for Qual HCG the night before.

## 2024-02-26 NOTE — TELEPHONE ENCOUNTER
Patient states that she is not able to keep today's appointment for Colpo procedure, and needs to schedule another appointment sooner then first available.

## 2024-03-06 ENCOUNTER — LAB ENCOUNTER (OUTPATIENT)
Dept: LAB | Facility: HOSPITAL | Age: 39
End: 2024-03-06
Attending: INTERNAL MEDICINE
Payer: MEDICAID

## 2024-03-06 ENCOUNTER — PATIENT MESSAGE (OUTPATIENT)
Dept: OBGYN CLINIC | Facility: CLINIC | Age: 39
End: 2024-03-06

## 2024-03-06 ENCOUNTER — TELEPHONE (OUTPATIENT)
Dept: OBGYN CLINIC | Facility: CLINIC | Age: 39
End: 2024-03-06

## 2024-03-06 DIAGNOSIS — R87.619 ABNORMAL CERVICAL PAPANICOLAOU SMEAR, UNSPECIFIED ABNORMAL PAP FINDING: ICD-10-CM

## 2024-03-06 DIAGNOSIS — E11.65 TYPE 2 DIABETES MELLITUS WITH HYPERGLYCEMIA, WITHOUT LONG-TERM CURRENT USE OF INSULIN (HCC): ICD-10-CM

## 2024-03-06 DIAGNOSIS — Z32.00 PREGNANCY EXAMINATION OR TEST, PREGNANCY UNCONFIRMED: ICD-10-CM

## 2024-03-06 DIAGNOSIS — R87.619 ABNORMAL CERVICAL PAPANICOLAOU SMEAR, UNSPECIFIED ABNORMAL PAP FINDING: Primary | ICD-10-CM

## 2024-03-06 LAB
ALBUMIN SERPL-MCNC: 4.3 G/DL (ref 3.2–4.8)
ALBUMIN/GLOB SERPL: 1.6 {RATIO} (ref 1–2)
ALP LIVER SERPL-CCNC: 69 U/L
ALT SERPL-CCNC: 10 U/L
ANION GAP SERPL CALC-SCNC: 2 MMOL/L (ref 0–18)
AST SERPL-CCNC: 9 U/L (ref ?–34)
B-HCG SERPL-ACNC: 16.5 MIU/ML
BILIRUB SERPL-MCNC: 0.2 MG/DL (ref 0.3–1.2)
BUN BLD-MCNC: 8 MG/DL (ref 9–23)
BUN/CREAT SERPL: 12.5 (ref 10–20)
CALCIUM BLD-MCNC: 9.3 MG/DL (ref 8.7–10.4)
CHLORIDE SERPL-SCNC: 108 MMOL/L (ref 98–112)
CHOLEST SERPL-MCNC: 135 MG/DL (ref ?–200)
CO2 SERPL-SCNC: 28 MMOL/L (ref 21–32)
CREAT BLD-MCNC: 0.64 MG/DL
CREAT UR-SCNC: 129.3 MG/DL
EGFRCR SERPLBLD CKD-EPI 2021: 116 ML/MIN/1.73M2 (ref 60–?)
EST. AVERAGE GLUCOSE BLD GHB EST-MCNC: 163 MG/DL (ref 68–126)
FASTING PATIENT LIPID ANSWER: NO
FASTING STATUS PATIENT QL REPORTED: NO
GLOBULIN PLAS-MCNC: 2.7 G/DL (ref 2.8–4.4)
GLUCOSE BLD-MCNC: 115 MG/DL (ref 70–99)
HBA1C MFR BLD: 7.3 % (ref ?–5.7)
HDLC SERPL-MCNC: 35 MG/DL (ref 40–59)
LDLC SERPL CALC-MCNC: 86 MG/DL (ref ?–100)
MICROALBUMIN UR-MCNC: <0.3 MG/DL
NONHDLC SERPL-MCNC: 100 MG/DL (ref ?–130)
OSMOLALITY SERPL CALC.SUM OF ELEC: 285 MOSM/KG (ref 275–295)
POTASSIUM SERPL-SCNC: 3.7 MMOL/L (ref 3.5–5.1)
PROT SERPL-MCNC: 7 G/DL (ref 5.7–8.2)
SODIUM SERPL-SCNC: 138 MMOL/L (ref 136–145)
TRIGL SERPL-MCNC: 70 MG/DL (ref 30–149)
VLDLC SERPL CALC-MCNC: 11 MG/DL (ref 0–30)

## 2024-03-06 PROCEDURE — 80053 COMPREHEN METABOLIC PANEL: CPT

## 2024-03-06 PROCEDURE — 80061 LIPID PANEL: CPT

## 2024-03-06 PROCEDURE — 36415 COLL VENOUS BLD VENIPUNCTURE: CPT

## 2024-03-06 PROCEDURE — 82043 UR ALBUMIN QUANTITATIVE: CPT

## 2024-03-06 PROCEDURE — 83036 HEMOGLOBIN GLYCOSYLATED A1C: CPT

## 2024-03-06 PROCEDURE — 82570 ASSAY OF URINE CREATININE: CPT

## 2024-03-06 PROCEDURE — 84703 CHORIONIC GONADOTROPIN ASSAY: CPT

## 2024-03-06 PROCEDURE — 84702 CHORIONIC GONADOTROPIN TEST: CPT

## 2024-03-06 NOTE — TELEPHONE ENCOUNTER
Please call patient. Ask when was LMP?    Her HCG is 16.5 -she is pregnant    She needs to call endocrine to manage her DM meds    Has colpo scheduled with Dr. Santana tomorrow - per Dr. Santana she will still see her for colpo but won't do ECC.

## 2024-03-06 NOTE — TELEPHONE ENCOUNTER
Pt informed of message below. Pt stated that her LMP was 2/12 and her cycles are every 28 days. Pt informed that JMN will still see her for colpo tomorrow but biopsy will not be done. Pt advised to NOT take ibuprofen. Pt verbalized understanding.      Message to EMB--should pt repeat quant 48 hours from the one today? Or any further recs at this time?

## 2024-03-06 NOTE — TELEPHONE ENCOUNTER
From: Collette Venegas  To: Theodora Velasco  Sent: 3/6/2024 5:45 PM CST  Subject: Hcg blood results     Hello can someone give me a call back. I received a voicemail saying that my results need to be discussed. Thank you

## 2024-03-07 ENCOUNTER — TELEPHONE (OUTPATIENT)
Dept: ENDOCRINOLOGY CLINIC | Facility: CLINIC | Age: 39
End: 2024-03-07

## 2024-03-07 ENCOUNTER — NURSE ONLY (OUTPATIENT)
Dept: ENDOCRINOLOGY CLINIC | Facility: CLINIC | Age: 39
End: 2024-03-07

## 2024-03-07 ENCOUNTER — OFFICE VISIT (OUTPATIENT)
Dept: OBGYN CLINIC | Facility: CLINIC | Age: 39
End: 2024-03-07
Payer: MEDICAID

## 2024-03-07 VITALS
BODY MASS INDEX: 28 KG/M2 | WEIGHT: 154.63 LBS | SYSTOLIC BLOOD PRESSURE: 110 MMHG | DIASTOLIC BLOOD PRESSURE: 74 MMHG | HEART RATE: 73 BPM

## 2024-03-07 DIAGNOSIS — R87.810 HUMAN PAPILLOMAVIRUS (HPV) TYPE 16 DNA DETECTED IN CERVICAL SPECIMEN: ICD-10-CM

## 2024-03-07 DIAGNOSIS — R87.613 PAPANICOLAOU SMEAR OF CERVIX WITH HIGH GRADE SQUAMOUS INTRAEPITHELIAL LESION (HGSIL): Primary | ICD-10-CM

## 2024-03-07 DIAGNOSIS — E11.65 UNCONTROLLED TYPE 2 DIABETES MELLITUS WITH HYPERGLYCEMIA (HCC): Primary | ICD-10-CM

## 2024-03-07 PROCEDURE — 99211 OFF/OP EST MAY X REQ PHY/QHP: CPT | Performed by: INTERNAL MEDICINE

## 2024-03-07 PROCEDURE — 57455 BIOPSY OF CERVIX W/SCOPE: CPT | Performed by: OBSTETRICS & GYNECOLOGY

## 2024-03-07 RX ORDER — INSULIN DEGLUDEC INJECTION 100 U/ML
INJECTION, SOLUTION SUBCUTANEOUS
Qty: 15 ML | Refills: 2 | Status: CANCELLED | OUTPATIENT
Start: 2024-03-07

## 2024-03-07 RX ORDER — INSULIN LISPRO 100 [IU]/ML
INJECTION, SOLUTION INTRAVENOUS; SUBCUTANEOUS
Qty: 15 ML | Refills: 2 | Status: SHIPPED | OUTPATIENT
Start: 2024-03-07

## 2024-03-07 RX ORDER — ACYCLOVIR 400 MG/1
1 TABLET ORAL
Qty: 3 EACH | Refills: 2 | Status: SHIPPED | OUTPATIENT
Start: 2024-03-07

## 2024-03-07 RX ORDER — INSULIN GLARGINE 100 [IU]/ML
INJECTION, SOLUTION SUBCUTANEOUS
Qty: 15 ML | Refills: 2 | Status: SHIPPED | OUTPATIENT
Start: 2024-03-07

## 2024-03-07 RX ORDER — PEN NEEDLE, DIABETIC 30 GX3/16"
1 NEEDLE, DISPOSABLE MISCELLANEOUS 4 TIMES DAILY
Qty: 150 EACH | Refills: 2 | Status: SHIPPED | OUTPATIENT
Start: 2024-03-07

## 2024-03-07 NOTE — PROGRESS NOTES
Dexcom G7/Insulin Start/Pregnancy    Start Time: 4:02pm  End Time: 5:00pm    Indication: Pt is T2D followed by Dr Swan. Pt recently found she was pregnant and advised [facility] to start insulin and Dexcom G7.    A1C: 7.3% (3/6/2024)    Current Diabetes Medication:  Glimepiride 4mg daily --> last took yesterday  Trulicity 4.5mg/weekly --> last took on Friday     New Regimen:  Lantus 30 units daily (PM)  Lispro 10 units TID with meals    Topics Discussed:  - Dexcom G7 downloaded on phone  - Dexcom Clarity jh downloaded  - Reviewed Dexcom process and application  - Dexcom sensor applied on back of arm  - Warm up period in process  - Reviewed alerts, calibrating, and logging information  - Reviewed contacting Dexcom if sensor fails for replacement    Plan:  - Pt is 3 weeks pregnant; 3 older boys at home- last son (10yr) was on insulin for pregnancy; miscarriage last year at 2mo  - Reviewed pregnancy and diabetes   --> Target blood sugars of fasting < 95 mg/dl and 2 hrs post prandial <120 mg/dl   --> Target A1C < 6%   --> 75g of carbs per meal  - Advised to stop Trulicity and Glimepiride  - Start Lantus 30 units daily (PM)  - Start Lispro 10 units with meals  - Reviewed MOA of insulin storage, dosing, and administration. Advised 10-20 minute pre-bolus with Lispro  - Dexcom G7 and connected to clinic  - Log provided; Update on Monday  - Rx's sent in  - Plan reviewed with Dr Swan at time of appointment    Follow Up:  4/22/2024 Dr Beny Huitron  [facility]

## 2024-03-07 NOTE — TELEPHONE ENCOUNTER
Pt stopped at desk-  Pt found out yesterday she is pregnant and has some questions regarding diabetic medications- pl call pt to discuss

## 2024-03-07 NOTE — TELEPHONE ENCOUNTER
Dr. Swan,     Pt found out yesterday that she's pregnant.  States her glucose lately fasting had been in the 140s  RN asked pt to check glucose while on the phone and it's 179.  She just ate 30 mins ago.  Pt did not take glimepiride 4 mg today and last dose of Trulicity 4.5 mg was last Friday (RN informed patient to hold this).    She used to be on lantus 20 units HS and insulin lispro 6 units TID in the past per patient.   RN advised patient to start checking glucose fasting and 2 hours PP.   Pt is requesting for Dexcom to be prescribed.     Last A1C done yesterday from PCP office 7.3%    Please advise if you want to see patient or by CDCE    Thank you

## 2024-03-07 NOTE — TELEPHONE ENCOUNTER
She is day 24 today of cycle. So she would be 3w3d. Very early. As long as no bleeding or pain, I would set up nurse new OB visit is she desires to establish care.  She is diabetic.

## 2024-03-07 NOTE — TELEPHONE ENCOUNTER
Called patient and accepted CDCE appointment today.   Reviewed [facility] location    Future Appointments   Date Time Provider Department Center   3/7/2024  3:45 PM [facility] END[facility]   4/22/2024 10:00 AM Rebecca Swan MD [facility]

## 2024-03-07 NOTE — TELEPHONE ENCOUNTER
Pt started on Dexcom G7; PA required    Medication PA Requested:    Dexcom G7                                                      CoverMyMeds Used:  Key:  Quantity: 3 sensors  Day Supply: 30 days  Sig: Change sensor every 10 days  DX Code:   E11.9; pt on 4 insulin injections per day                                  CPT code (if applicable):   Case Number/Pending Ref#:

## 2024-03-07 NOTE — PROCEDURES
Colpo w/Biopsy Cervix    Pregnancy Results: positive    Consent signed.  Procedure discussed with patient in detail including indication, risk, benefits, alternatives and complications.    Pre-Procedure:  Cervix prepped with:  Acetic acid    Procedure:  Under satisfactory analgesia, the patient was prepped and draped in the dorsal lithotomy position.  [place] speculum was placed in the vagina.  Under colposcopic examination the transition zone was seen in entirety.   Cervical biopsy performed with cervical biopsy punch at 6 o'clock.  Endocervix curette was not performed due to pregnancy test positive  Silver nitrate applied  Monsel's solution was applied.  Specimen sent to pathology.  Patient tolerated procedure well.      Findings:  Acetowhite epithelium    Impression:  Low-grade dysplasia

## 2024-03-08 ENCOUNTER — TELEPHONE (OUTPATIENT)
Dept: OBGYN CLINIC | Facility: CLINIC | Age: 39
End: 2024-03-08

## 2024-03-08 DIAGNOSIS — R87.613 PAPANICOLAOU SMEAR OF CERVIX WITH HIGH GRADE SQUAMOUS INTRAEPITHELIAL LESION (HGSIL): Primary | ICD-10-CM

## 2024-03-08 DIAGNOSIS — Z3A.01 LESS THAN 8 WEEKS GESTATION OF PREGNANCY (HCC): ICD-10-CM

## 2024-03-09 ENCOUNTER — TELEPHONE (OUTPATIENT)
Dept: OBGYN CLINIC | Facility: CLINIC | Age: 39
End: 2024-03-09

## 2024-03-09 NOTE — TELEPHONE ENCOUNTER
Pt calling regarding results from her Colpo. Pt informed we are awaiting N to review and give recs.  Pt currently appox 4w pregnant, states she is passing \"peanut butter\" looking discharge. Pt informed this is most likely d/t the solution used on the cervix during the procedure. Pt informed we would want her to reach out if it is red or pink in color, having a flow, passing clots or having any abdominal cramping.     Pt does indicate mild cramping that comes and goes, nothing she feels she needs to medicate for. Pt encouraged to increase fluids and rest.  Informed that if pain is every 7-10 or greater, not controlled by hydration, rest or with Tylenol to head to ER. This also includes if only one sided pain. Pt states understanding.       To JMN on-call for review and sign-off. Thank you.

## 2024-03-09 NOTE — TELEPHONE ENCOUNTER
Called Collette and reviewed results. Informed her that recommendation is for quarterly colposcopy with Gyn/Onc throughout pregnancy since LEEP procedure can not be done at this time. Please send patient Dr. Foley's information, she is aware that she needs to schedule with him. All questions answered.

## 2024-03-09 NOTE — TELEPHONE ENCOUNTER
Spoke with Pt. Pt given Dr Foley's number [phone]    Information sent to surgery sched Incase of need for referral to Dr Foley

## 2024-03-10 ENCOUNTER — HOSPITAL ENCOUNTER (EMERGENCY)
Facility: HOSPITAL | Age: 39
Discharge: HOME | End: 2024-03-11
Attending: EMERGENCY MEDICINE
Payer: MEDICAID

## 2024-03-10 DIAGNOSIS — N93.9 VAGINAL BLEEDING: Primary | ICD-10-CM

## 2024-03-10 PROCEDURE — 99284 EMERGENCY DEPT VISIT MOD MDM: CPT

## 2024-03-10 PROCEDURE — 36415 COLL VENOUS BLD VENIPUNCTURE: CPT

## 2024-03-11 ENCOUNTER — APPOINTMENT (OUTPATIENT)
Dept: ULTRASOUND IMAGING | Facility: HOSPITAL | Age: 39
End: 2024-03-11
Attending: EMERGENCY MEDICINE
Payer: MEDICAID

## 2024-03-11 VITALS
HEART RATE: 70 BPM | OXYGEN SATURATION: 99 % | SYSTOLIC BLOOD PRESSURE: 121 MMHG | TEMPERATURE: 98 F | RESPIRATION RATE: 16 BRPM | DIASTOLIC BLOOD PRESSURE: 80 MMHG

## 2024-03-11 LAB
ANION GAP SERPL CALC-SCNC: 5 MMOL/L (ref 0–18)
B-HCG SERPL-ACNC: <2.6 MIU/ML
BASOPHILS # BLD AUTO: 0.02 X10(3) UL (ref 0–0.2)
BASOPHILS NFR BLD AUTO: 0.2 %
BUN BLD-MCNC: 15 MG/DL (ref 9–23)
BUN/CREAT SERPL: 23.4 (ref 10–20)
CALCIUM BLD-MCNC: 9 MG/DL (ref 8.7–10.4)
CHLORIDE SERPL-SCNC: 108 MMOL/L (ref 98–112)
CO2 SERPL-SCNC: 27 MMOL/L (ref 21–32)
CREAT BLD-MCNC: 0.64 MG/DL
DEPRECATED RDW RBC AUTO: 42.2 FL (ref 35.1–46.3)
EGFRCR SERPLBLD CKD-EPI 2021: 115 ML/MIN/1.73M2 (ref 60–?)
EOSINOPHIL # BLD AUTO: 0.08 X10(3) UL (ref 0–0.7)
EOSINOPHIL NFR BLD AUTO: 0.8 %
ERYTHROCYTE [DISTWIDTH] IN BLOOD BY AUTOMATED COUNT: 14.5 % (ref 11–15)
GLUCOSE BLD-MCNC: 142 MG/DL (ref 70–99)
HCT VFR BLD AUTO: 37.2 %
HGB BLD-MCNC: 11.6 G/DL
IMM GRANULOCYTES # BLD AUTO: 0.03 X10(3) UL (ref 0–1)
IMM GRANULOCYTES NFR BLD: 0.3 %
LYMPHOCYTES # BLD AUTO: 2.68 X10(3) UL (ref 1–4)
LYMPHOCYTES NFR BLD AUTO: 26.7 %
MCH RBC QN AUTO: 25.1 PG (ref 26–34)
MCHC RBC AUTO-ENTMCNC: 31.2 G/DL (ref 31–37)
MCV RBC AUTO: 80.3 FL
MONOCYTES # BLD AUTO: 0.51 X10(3) UL (ref 0.1–1)
MONOCYTES NFR BLD AUTO: 5.1 %
NEUTROPHILS # BLD AUTO: 6.72 X10 (3) UL (ref 1.5–7.7)
NEUTROPHILS # BLD AUTO: 6.72 X10(3) UL (ref 1.5–7.7)
NEUTROPHILS NFR BLD AUTO: 66.9 %
OSMOLALITY SERPL CALC.SUM OF ELEC: 293 MOSM/KG (ref 275–295)
PLATELET # BLD AUTO: 378 10(3)UL (ref 150–450)
POTASSIUM SERPL-SCNC: 4 MMOL/L (ref 3.5–5.1)
RBC # BLD AUTO: 4.63 X10(6)UL
RH BLOOD TYPE: POSITIVE
SODIUM SERPL-SCNC: 140 MMOL/L (ref 136–145)
WBC # BLD AUTO: 10 X10(3) UL (ref 4–11)

## 2024-03-11 PROCEDURE — 76801 OB US < 14 WKS SINGLE FETUS: CPT | Performed by: EMERGENCY MEDICINE

## 2024-03-11 PROCEDURE — 85025 COMPLETE CBC W/AUTO DIFF WBC: CPT | Performed by: EMERGENCY MEDICINE

## 2024-03-11 PROCEDURE — 86901 BLOOD TYPING SEROLOGIC RH(D): CPT | Performed by: EMERGENCY MEDICINE

## 2024-03-11 PROCEDURE — 80048 BASIC METABOLIC PNL TOTAL CA: CPT | Performed by: EMERGENCY MEDICINE

## 2024-03-11 PROCEDURE — 84702 CHORIONIC GONADOTROPIN TEST: CPT | Performed by: EMERGENCY MEDICINE

## 2024-03-11 PROCEDURE — 76817 TRANSVAGINAL US OBSTETRIC: CPT | Performed by: EMERGENCY MEDICINE

## 2024-03-11 PROCEDURE — 86900 BLOOD TYPING SEROLOGIC ABO: CPT | Performed by: EMERGENCY MEDICINE

## 2024-03-11 NOTE — ED PROVIDER NOTES
Patient Seen in: [facility] Emergency Department      History     Chief Complaint   Patient presents with    Pregnancy Issues     Stated Complaint: Pregnancy Issues    Subjective:   HPI    Pt is 38 yo A3 at approximately 4 weeks gestation who p/w abdominal cramping that started tonight.  Patient had colposcopy 4 days ago and has had brown vaginal discharge since then but states tonight at 8 PM she started having vaginal bleeding that is more similar to her period.  No passage of clots.  No vomiting or dysuria.    Objective:   Past Medical History:   Diagnosis Date    Anxiety     Decorative tattoo     Diabetes (HCC)     Human papilloma virus infection     No diabetic retinopathy in both eyes 2018              Past Surgical History:   Procedure Laterality Date    COLPOSCOPY,BX CERVIX/ENDOCERV CURR                  Social History     Socioeconomic History    Marital status:    Tobacco Use    Smoking status: Former     Types: Cigarettes    Smokeless tobacco: Never    Tobacco comments:     Quit    Vaping Use    Vaping Use: Never used   Substance and Sexual Activity    Alcohol use: Not Currently    Drug use: No    Sexual activity: Yes   Other Topics Concern    Caffeine Concern No    Exercise Yes    Seat Belt Yes    Special Diet No    Stress Concern No    Weight Concern No   Social History Narrative    ** Merged History Encounter **                   Review of Systems    Positive for stated complaint: Pregnancy Issues  Other systems are as noted in HPI.  Constitutional and vital signs reviewed.      All other systems reviewed and negative except as noted above.    Physical Exam     ED Triage Vitals [03/10/24 2127]   /84   Pulse 72   Resp 16   Temp 98.1 °F (36.7 °C)   Temp src Temporal   SpO2 99 %   O2 Device None (Room air)       Current:/80   Pulse 70   Temp 98.1 °F (36.7 °C) (Temporal)   Resp 16   LMP 2024 (Exact Date)   SpO2 99%         Physical Exam  GENERAL: No  acute distress, awake and alert  HEENT: EOMI, PERRL  Neck: supple  CV: RRR, no murmurs  Resp: CTAB, no wheezes or retractions  Ab: soft, nontender, no distension  Extremities: FROM of all extremities  Neuro: CN intact, normal speech, normal gait, 5/5 motor strength in all extremities, no focal deficits  SKIN: warm, dry, no rashes      ED Course     Labs Reviewed   BASIC METABOLIC PANEL (8) - Abnormal; Notable for the following components:       Result Value    Glucose 142 (*)     BUN/CREA Ratio 23.4 (*)     All other components within normal limits   CBC W/ DIFFERENTIAL - Abnormal; Notable for the following components:    HGB 11.6 (*)     MCH 25.1 (*)     All other components within normal limits   HCG, BETA SUBUNIT (QUANT PREGNANCY TEST) - Normal   CBC WITH DIFFERENTIAL WITH PLATELET    Narrative:     The following orders were created for panel order CBC With Differential With Platelet.  Procedure                               Abnormality         Status                     ---------                               -----------         ------                     CBC W/ DIFFERENTIAL[[id number]]          Abnormal            Final result                 Please view results for these tests on the individual orders.   ABORH (BLOOD TYPE)            MDM         Medical Decision Making  Patient notified of results.  hCG level negative and we discussed close follow-up with her PCP but at this time she is not pregnant and suspect normal menstrual cycle as she states she is due for menstruation.  She feels comfortable with discharge.    Amount and/or Complexity of Data Reviewed  External Data Reviewed: labs and notes.     Details: Recent OB notes, labs reviewed  Labs: ordered.  Radiology: ordered.     Details:   US pregnancy first trimester      IMPRESSION:  No priors.  The uterus is retroverted.  No intrauterine gestational sac is identified.  The endometrial stripe complex is thickened, measuring up to 13 mm in thickness.  It  shows no vascular material.  No free fluid in the pelvis.  The ovaries appear normal bilaterally.      Report sent at 02:36 AM ET    Philip Washington MD          Disposition and Plan     Clinical Impression:  1. Vaginal bleeding         Disposition:  Discharge  3/11/2024  1:55 am    Follow-up:  Amy Santana MD  [address] [place] [id number]  [phone]    Follow up            Medications Prescribed:  Discharge Medication List as of 3/11/2024  1:58 AM

## 2024-03-11 NOTE — ED INITIAL ASSESSMENT (HPI)
Pt to ED for concern of a miscarriage. Pt states she tested positive for pregnancy while at doctor earlier this week, around 4 weeks pregnant, but today, pt started having vaginal bleeding and cramping. Pt's last period was 1 month ago.

## 2024-03-15 ENCOUNTER — TELEPHONE (OUTPATIENT)
Dept: ENDOCRINOLOGY CLINIC | Facility: CLINIC | Age: 39
End: 2024-03-15

## 2024-03-15 NOTE — TELEPHONE ENCOUNTER
Prior Authorization needed    Message:  Plan does not cover this medication.  Please call plan at [phone] to initiate prior authorization or call/fax pharmacy to change medication.  Patient ID# is [id number].    Current Outpatient Medications   Medication Sig Dispense Refill                                                                          Insulin Pen Needle (BD PEN NEEDLE BRIDGET U/F) 32G X 4 MM Does not apply Misc Inject 4 times per day 400 each 1

## 2024-03-20 NOTE — TELEPHONE ENCOUNTER
Spoke to pharmacist Latanya: pharmacy was able to change RX to True Plus pen needles which are preferred by insurance - patient has picked up pen needles

## 2024-04-11 DIAGNOSIS — O24.111 PRE-EXISTING TYPE 2 DIABETES MELLITUS DURING PREGNANCY IN FIRST TRIMESTER (HCC): ICD-10-CM

## 2024-04-11 RX ORDER — GLIMEPIRIDE 4 MG/1
4 TABLET ORAL
Refills: 0 | OUTPATIENT
Start: 2024-04-11

## 2024-04-12 RX ORDER — DULAGLUTIDE 4.5 MG/.5ML
4.5 INJECTION, SOLUTION SUBCUTANEOUS WEEKLY
Qty: 2 ML | Refills: 3 | Status: SHIPPED | OUTPATIENT
Start: 2024-04-12

## 2024-04-12 RX ORDER — BLOOD SUGAR DIAGNOSTIC
STRIP MISCELLANEOUS
Qty: 400 STRIP | Refills: 1 | Status: SHIPPED | OUTPATIENT
Start: 2024-04-12

## 2024-04-12 NOTE — TELEPHONE ENCOUNTER
Endocrine Refill protocol for Glucose testing supplies       Protocol Criteria:  Appointment with Endocrinology completed in the last 12 months or scheduled in the next 6 months     Verify appointment has been completed or scheduled in the appropriate timeline. If so can send a 90 day supply with 1 refill.        Last completed office visit:12/08/23  Next scheduled Follow up: 04/22/24

## 2024-04-22 ENCOUNTER — PATIENT MESSAGE (OUTPATIENT)
Dept: ENDOCRINOLOGY CLINIC | Facility: CLINIC | Age: 39
End: 2024-04-22

## 2024-04-22 ENCOUNTER — OFFICE VISIT (OUTPATIENT)
Dept: ENDOCRINOLOGY CLINIC | Facility: CLINIC | Age: 39
End: 2024-04-22

## 2024-04-22 VITALS
BODY MASS INDEX: 28.17 KG/M2 | WEIGHT: 159 LBS | HEIGHT: 63 IN | SYSTOLIC BLOOD PRESSURE: 110 MMHG | DIASTOLIC BLOOD PRESSURE: 73 MMHG | HEART RATE: 60 BPM

## 2024-04-22 DIAGNOSIS — E11.65 UNCONTROLLED TYPE 2 DIABETES MELLITUS WITH HYPERGLYCEMIA (HCC): Primary | ICD-10-CM

## 2024-04-22 LAB
GLUCOSE BLOOD: 219
HEMOGLOBIN A1C: 8.3 % (ref 4.3–5.6)
TEST STRIP LOT #: NORMAL NUMERIC

## 2024-04-22 PROCEDURE — 83036 HEMOGLOBIN GLYCOSYLATED A1C: CPT | Performed by: INTERNAL MEDICINE

## 2024-04-22 PROCEDURE — 99214 OFFICE O/P EST MOD 30 MIN: CPT | Performed by: INTERNAL MEDICINE

## 2024-04-22 PROCEDURE — 82947 ASSAY GLUCOSE BLOOD QUANT: CPT | Performed by: INTERNAL MEDICINE

## 2024-04-22 RX ORDER — DULAGLUTIDE 3 MG/.5ML
3 INJECTION, SOLUTION SUBCUTANEOUS WEEKLY
Qty: 6 ML | Refills: 1 | Status: SHIPPED | OUTPATIENT
Start: 2024-04-22

## 2024-04-22 RX ORDER — FLUCONAZOLE 150 MG/1
150 TABLET ORAL ONCE
Qty: 1 TABLET | Refills: 0 | Status: SHIPPED | OUTPATIENT
Start: 2024-04-22 | End: 2024-04-22

## 2024-04-22 RX ORDER — GLIMEPIRIDE 4 MG/1
4 TABLET ORAL
Qty: 90 TABLET | Refills: 1 | Status: SHIPPED | OUTPATIENT
Start: 2024-04-22

## 2024-04-22 NOTE — PROGRESS NOTES
Name: Collette Venegas  Date: 4/22/2024    Referring Physician: No ref. provider found    HISTORY OF PRESENT ILLNESS   Collette Venegas is a 39 year old female who presents for diabetes mellitus, diagnosed 7 years ago.      She had gestational diabetes with several pregnancies and progressed after delivery of 3rd baby.      Since last visit she had miscarriage in March however she is still taking insulin therapy.     Prior HbA, C or glycohemoglobin were 11.6% 7/2020; 10.3% 10/2020; 10.0% 7/2022; 8.7% 9/2022; 9.5% 1/2023; 8.0% 5/2023; 7.5% 7/2023; 7.5% 8/2023; 8.2% 12/2023; 8.3% POC Today     Dietary compliance: Fair -->she has not undergone any recent education; she tries to watch CHO intake; no pop or juice   Exercise: No  Polyuria/polydipsia: No  Blurred vision: No    Episodes of hypoglycemia: No  Blood Glucose:  Post prandial: 200-220    Medications for DM   Humalog 20 units subcutaneous TID with meals  Lantus 20 units subcutaneous at bedtime   -->medications stopped due to pregnancy     Farxiga d/c'd due to stomach upset   Insulin stopped since no longer pregnant   Victoza stopped since she didn't want medication   Glipizide stopped because she doesn't want to be dependent   Metformin d/c'd due to GI distress     REVIEW OF SYSTEMS  Eyes: Diabetic retinopathy present: No            Most recent visit to eye doctor in last 12 months: Yes    CV: Cardiovascular disease present: No         Hypertension present: No         Hyperlipidemia present: Yes         Peripheral Vascular Disease present: No    : Nephropathy present: No    Neuro: Neuropathy present: No    Skin: Infection or ulceration: No    Osteoporosis: No    Thyroid disease: No      Medications:     Current Outpatient Medications:     Glucose Blood (ONETOUCH VERIO) In Vitro Strip, Check 4 times per day, Disp: 400 strip, Rfl: 1    Continuous Blood Gluc Sensor (DEXCOM G7 SENSOR) Does not apply Misc, 1 each Every 10 days. Use as directed every 10 days, Disp: 3  each, Rfl: 2    Insulin Lispro, 1 Unit Dial, 100 UNIT/ML Subcutaneous Solution Pen-injector, Inject 10 units 3x daily with meals, Disp: 15 mL, Rfl: 2    Insulin Pen Needle (PEN NEEDLES) 32G X 4 MM Does not apply Misc, 1 each in the morning, at noon, in the evening, and at bedtime. Inject 4x daily, Disp: 150 each, Rfl: 2    insulin glargine (LANTUS SOLOSTAR) 100 UNIT/ML Subcutaneous Solution Pen-injector, Inject 30 units daily, Disp: 15 mL, Rfl: 2    Lancets (ONETOUCH DELICA PLUS [id number]) Does not apply Misc, 1 Device in the morning, at noon, in the evening, and at bedtime., Disp: 400 each, Rfl: 1    Lancets (ONETOUCH DELICA PLUS [id number]) Does not apply Misc, 1 Device daily., Disp: 100 each, Rfl: 1    Dulaglutide (TRULICITY) 4.5 MG/0.5ML Subcutaneous Solution Pen-injector, Inject 4.5 mg into the skin once a week. (Patient not taking: Reported on 4/22/2024), Disp: 2 mL, Rfl: 3    dapagliflozin (FARXIGA) 10 MG Oral Tab, Take 1 tablet (10 mg total) by mouth daily. (Patient not taking: Reported on 3/7/2024), Disp: 90 tablet, Rfl: 0    Dulaglutide (TRULICITY) 3 MG/0.5ML Subcutaneous Solution Pen-injector, Inject 3 mg into the skin once a week., Disp: 2 mL, Rfl: 0    glimepiride 4 MG Oral Tab, Take 1 tablet (4 mg total) by mouth every morning before breakfast. (Patient not taking: Reported on 4/22/2024), Disp: , Rfl:     Insulin Pen Needle (BD PEN NEEDLE BRIDGET U/F) 32G X 4 MM Does not apply Misc, Inject 4 times per day (Patient not taking: Reported on 3/20/2024), Disp: 400 each, Rfl: 1    Continuous Blood Gluc Sensor (DEXCOM G7 SENSOR) Does not apply Misc, 1 Device Every 10 days. (Patient not taking: Reported on 3/7/2024), Disp: 3 each, Rfl: 5    Blood Glucose Monitoring Suppl (ONETOUCH VERIO) w/Device Does not apply Kit, 1 Device daily. (Patient not taking: Reported on 3/7/2024), Disp: 1 kit, Rfl: 0     Allergies:   No Known Allergies    Social History:   Social History     Socioeconomic History    Marital status:     Tobacco Use    Smoking status: Former     Types: Cigarettes    Smokeless tobacco: Never    Tobacco comments:     Quit 2011   Vaping Use    Vaping status: Never Used   Substance and Sexual Activity    Alcohol use: Not Currently    Drug use: No    Sexual activity: Yes   Other Topics Concern    Caffeine Concern No    Exercise Yes    Seat Belt Yes    Special Diet No    Stress Concern No    Weight Concern No       Medical History:   Past Medical History:    Anxiety    Decorative tattoo    Diabetes (HCC)    Human papilloma virus infection    No diabetic retinopathy in both eyes       Surgical history:   Past Surgical History:   Procedure Laterality Date    Colposcopy,bx cervix/endocerv curr           PHYSICAL EXAM  /73   Pulse 60   Ht 5' 3\" (1.6 m)   Wt 159 lb (72.1 kg)   LMP 02/12/2024 (Exact Date)   BMI 28.17 kg/m²     General Appearance:  alert, well developed, in no acute distress  Eyes:  normal conjunctivae, sclera., normal sclera and normal pupils  Ears/Nose/Mouth/Throat/Neck:  no palpable thyroid nodules or cervical lymphadenopathy  Back: no kyphosis or back tenderness  Musculoskeletal:  normal muscle strength and tone  Skin:  normal moisture and skin texture  Hair & Nails:  normal scalp hair     Hematologic:  no excessive bruising  Neuro:  sensory grossly intact and motor grossly intact  Psychiatric:  oriented to time, self, and place  Nutritional:  no abnormal weight gain or loss  Bilateral barefoot skin diabetic exam is normal, visualized feet and the appearance is normal.  Bilateral monofilament/sensation of both feet is normal.  Pulsation pedal pulse exam of both lower legs/feet is normal as well.        ASSESSMENT/PLAN:      1. Diabetes Mellitus Type 2, Uncontrolled  -Uncontrolled; HgA1c 8.3% -->stable   -Discussed importance of glycemic control to prevent complications of diabetes  -Discussed complications of diabetes include retinopathy, neuropathy, nephropathy and cardiovascular  disease  -Discussed ABCs of DM  -Discussed importance of SBGM  -Discussed importance of low CHO diet, recommend 45gm per meal or 135gm per day  -Hold SLGT-2 given frequent yeast infections   -Restart Trulicity 3mg subcutaneous weekly, verbalized understanding of risks and benefits    -Restart Glimepiride 4mg PO daily    -Provided instructions to restart insulin if consider repeat pregnancy - she will try to hold off on pregnancy until end of the year  -Discussed OCPs but she declined   -Normotensive  -Normal foot exam performed today   -Normal labs     A total of 30 minutes was spent on obtaining history, reviewing pertinent labs, evaluating patient, providing multiple treatment options, reinforcing diet/exercise and compliance, and completing documentation.       RTC 4 months     4/22/2024  Rebecca Swan MD

## 2024-04-24 NOTE — TELEPHONE ENCOUNTER
From: Collette Venegas  To: Rebecca Swan  Sent: 4/22/2024 2:54 PM CDT  Subject: Trulicity    Hi Dr Caryl Swan. There’s an issue going on with the trulicity prescribed for some reason my insurance isn’t covering what you ordered

## 2024-04-25 NOTE — TELEPHONE ENCOUNTER
Received fax from [facility] stating the TRULICITY 3MG/0.5ML SOL PEN INJ does not need a review at this time. This drug is covered by pt plan an may fill this drug at pt pharmacy.  [facility] sent  ID#[id number]  CASE#[id number]

## 2024-05-02 ENCOUNTER — TELEPHONE (OUTPATIENT)
Dept: OBGYN CLINIC | Facility: CLINIC | Age: 39
End: 2024-05-02

## 2024-05-02 DIAGNOSIS — Z32.00 PREGNANCY EXAMINATION OR TEST, PREGNANCY UNCONFIRMED: Primary | ICD-10-CM

## 2024-05-02 NOTE — TELEPHONE ENCOUNTER
Pt was to return to clinic for ECC on 4/8.  Pt did not show for appt.  Message to JMN.  For only an ECC, can a 10 min appt be used or will you need 20 min?  Pt was to only do a upt on 4/8 due to recently having a miscarriage in early March.  Is this still ok or will pt now need a blood test the day before appt?

## 2024-05-03 NOTE — TELEPHONE ENCOUNTER
Patient scheduled 5/14. Patient notified of need for serum hcg. Advised to complete lab on 5/13. Patient verbalized understanding.

## 2024-05-14 ENCOUNTER — LAB ENCOUNTER (OUTPATIENT)
Dept: LAB | Facility: HOSPITAL | Age: 39
End: 2024-05-14
Attending: OBSTETRICS & GYNECOLOGY

## 2024-05-14 ENCOUNTER — TELEPHONE (OUTPATIENT)
Dept: PEDIATRICS CLINIC | Facility: CLINIC | Age: 39
End: 2024-05-14

## 2024-05-14 DIAGNOSIS — Z32.00 PREGNANCY EXAMINATION OR TEST, PREGNANCY UNCONFIRMED: ICD-10-CM

## 2024-05-14 LAB — HCG SERPL QL: NEGATIVE

## 2024-05-14 PROCEDURE — 36415 COLL VENOUS BLD VENIPUNCTURE: CPT

## 2024-05-14 PROCEDURE — 84703 CHORIONIC GONADOTROPIN ASSAY: CPT

## 2024-05-14 NOTE — TELEPHONE ENCOUNTER
Patient states she is nervous about Colposcopy today and requesting prescription pain medication. Informed patient the doctor does not provide prescription pain medication for this procedure. Advised we recommend to take ibuprofen 600 mg one hour before appointment. Informed patient she was to do serum hcg yesterday. States she is in the lab now. Informed patient the lab will not result in one hour and appointment needs to be rescheduled. Assisted with rescheduling for tomorrow and advised to have lab drawn today.

## 2024-05-14 NOTE — TELEPHONE ENCOUNTER
Patient called, is scheduled for 2:40 PM procedure. Patient is requesting  a medications script for pain to be taken before appointment. Please call.

## 2024-05-15 ENCOUNTER — OFFICE VISIT (OUTPATIENT)
Dept: OBGYN CLINIC | Facility: CLINIC | Age: 39
End: 2024-05-15

## 2024-05-15 VITALS
HEART RATE: 80 BPM | WEIGHT: 154.63 LBS | DIASTOLIC BLOOD PRESSURE: 83 MMHG | SYSTOLIC BLOOD PRESSURE: 117 MMHG | BODY MASS INDEX: 27 KG/M2

## 2024-05-15 DIAGNOSIS — R87.613 PAPANICOLAOU SMEAR OF CERVIX WITH HIGH GRADE SQUAMOUS INTRAEPITHELIAL LESION (HGSIL): Primary | ICD-10-CM

## 2024-05-15 DIAGNOSIS — N89.8 VAGINAL IRRITATION: ICD-10-CM

## 2024-05-15 PROCEDURE — 57505 ENDOCERVICAL CURETTAGE: CPT | Performed by: OBSTETRICS & GYNECOLOGY

## 2024-05-15 RX ORDER — FLUCONAZOLE 150 MG/1
150 TABLET ORAL
Qty: 2 TABLET | Refills: 1 | Status: SHIPPED | OUTPATIENT
Start: 2024-05-15

## 2024-05-15 NOTE — PROCEDURES
Endocervical Curettage     Pregnancy Results: negative from blood test   Birth control method(s) used:  ; date last used:      Consent signed.  Procedure discussed with patient in detail including indication, risk, benefits, alternatives and complications.    Pre-Procedure:  Pre-Meds:  Ibuprofen      Procedure:  Under satisfactory analgesia, the patient was prepped and draped in the dorsal lithotomy position.  [place] speculum was placed in the vagina.  Endocervix was curetted using a Kervorkian curette.  Specimen sent to pathology.  Patient tolerated procedure well.      Findings:  Acetowhite epithelium    Impression:  Low-grade dysplasia

## 2024-05-16 LAB
BV BACTERIA DNA VAG QL NAA+PROBE: NEGATIVE
C GLABRATA DNA VAG QL NAA+PROBE: NEGATIVE
C KRUSEI DNA VAG QL NAA+PROBE: NEGATIVE
CANDIDA DNA VAG QL NAA+PROBE: POSITIVE
T VAGINALIS DNA VAG QL NAA+PROBE: NEGATIVE

## 2024-05-23 ENCOUNTER — TELEPHONE (OUTPATIENT)
Dept: OBGYN CLINIC | Facility: CLINIC | Age: 39
End: 2024-05-23

## 2024-05-23 NOTE — TELEPHONE ENCOUNTER
Amy Santana MD  5/21/2024 10:18 PM CDT Back to Top      Please notify Collette that her ECC results are normal. Since she has LAUREN 2 on colposcopy however, recommendation is for treatment with LEEP. We did discuss this when she came in for the ECC collection. Please assist her in scheduling this procedure.       Patient aware of above information, patient would like to be seen sooner than 7/10/24 which is the first available appointment.   To Dr. Santana for appointment time recommendations

## 2024-05-31 ENCOUNTER — PATIENT MESSAGE (OUTPATIENT)
Dept: OBGYN CLINIC | Facility: CLINIC | Age: 39
End: 2024-05-31

## 2024-05-31 DIAGNOSIS — Z32.00 PREGNANCY EXAMINATION OR TEST, PREGNANCY UNCONFIRMED: Primary | ICD-10-CM

## 2024-06-01 NOTE — TELEPHONE ENCOUNTER
From: Collette Venegas  To: Amy Santana  Sent: 5/31/2024 4:33 PM CDT  Subject: Call back    Hello I was returning the call back from the office. Can you give me a call thank yogesh

## 2024-06-03 NOTE — TELEPHONE ENCOUNTER
Patient is scheduled to have leep procedure on 6/10 with Dr. Santana at 11:40am. See patient message thread on 5/31.

## 2024-06-03 NOTE — TELEPHONE ENCOUNTER
Patient sent [facility] to schedule leep procedure. RN called and spoke with patient to assist in scheduling the leep.     Patient informed of Dr. Santana recommendations and accepts appointment for 6/10 at 11:40am. Patient states she started her period today, and reports it usually last about 5-6d. Patient informed she can't be on her menses the day of the procedure, but okay to keep appointment as long as she's done with her menses. Patient verbalized understanding.    Patient informed that she would need to complete HCG qual the day before and advised to take 600 mg of Ibuprofen 1 hr before her appointment. HCG qual ordered. Patient agreed and verbalized understanding.

## 2024-06-04 RX ORDER — DULAGLUTIDE 3 MG/.5ML
3 INJECTION, SOLUTION SUBCUTANEOUS WEEKLY
Qty: 6 ML | Refills: 1 | Status: SHIPPED | OUTPATIENT
Start: 2024-06-04

## 2024-06-04 NOTE — TELEPHONE ENCOUNTER
Endocrine Refill protocol for oral and injectable diabetic medications    passed    Protocol Criteria:    -Appointment with Endocrinology completed in the last 6 months or scheduled in the next 3 months    -A1c result <8.5% in the past 6 months      Verify the above has been completed or scheduled in the appropriate timeline. If so can send a 90 day supply with 1 refill.            Last completed office visit: 4/22/2024   Next scheduled Follow up: 08/29/2024     Last A1C result: 8.3% done 4/22/2024.     90 day + 1 refill pending

## 2024-06-09 ENCOUNTER — PATIENT MESSAGE (OUTPATIENT)
Dept: OBGYN CLINIC | Facility: CLINIC | Age: 39
End: 2024-06-09

## 2024-06-10 NOTE — TELEPHONE ENCOUNTER
From: Collette Venegas  To: Amy Santana  Sent: 6/9/2024 4:16 PM CDT  Subject: Lab test    Hello. I forgot to go in today to do the pregnancy test at the lab. Can I go tomorrow as soon as they open? Will that work?

## 2024-06-10 NOTE — TELEPHONE ENCOUNTER
Called patient and informed Leep needs to be rescheduled due to not completing serum hcg yesterday. Patient agrees and rescheduled to 6/19. Reminded to do lab the day before appointment. Provided lab hours and advised to take ibuprofen 600 mg with food one hour before appointment time.

## 2024-06-18 ENCOUNTER — LAB ENCOUNTER (OUTPATIENT)
Dept: LAB | Facility: HOSPITAL | Age: 39
End: 2024-06-18
Attending: OBSTETRICS & GYNECOLOGY

## 2024-06-18 DIAGNOSIS — Z32.00 PREGNANCY EXAMINATION OR TEST, PREGNANCY UNCONFIRMED: ICD-10-CM

## 2024-06-18 LAB — HCG SERPL QL: NEGATIVE

## 2024-06-18 PROCEDURE — 84703 CHORIONIC GONADOTROPIN ASSAY: CPT

## 2024-06-18 PROCEDURE — 36415 COLL VENOUS BLD VENIPUNCTURE: CPT

## 2024-06-19 ENCOUNTER — OFFICE VISIT (OUTPATIENT)
Dept: OBGYN CLINIC | Facility: CLINIC | Age: 39
End: 2024-06-19

## 2024-06-19 VITALS
BODY MASS INDEX: 27 KG/M2 | HEART RATE: 78 BPM | SYSTOLIC BLOOD PRESSURE: 108 MMHG | DIASTOLIC BLOOD PRESSURE: 75 MMHG | WEIGHT: 153.81 LBS

## 2024-06-19 DIAGNOSIS — N87.1 DYSPLASIA OF CERVIX, HIGH GRADE CIN 2: Primary | ICD-10-CM

## 2024-06-19 DIAGNOSIS — L29.2 VULVAR ITCHING: ICD-10-CM

## 2024-06-19 PROCEDURE — 57522 CONIZATION OF CERVIX: CPT | Performed by: OBSTETRICS & GYNECOLOGY

## 2024-06-19 RX ORDER — FLUCONAZOLE 150 MG/1
150 TABLET ORAL
Qty: 2 TABLET | Refills: 0 | Status: SHIPPED | OUTPATIENT
Start: 2024-06-19

## 2024-08-05 ENCOUNTER — PATIENT MESSAGE (OUTPATIENT)
Dept: OBGYN CLINIC | Facility: CLINIC | Age: 39
End: 2024-08-05

## 2024-08-05 RX ORDER — FLUCONAZOLE 150 MG/1
150 TABLET ORAL
Qty: 2 TABLET | Refills: 0 | Status: SHIPPED | OUTPATIENT
Start: 2024-08-05

## 2024-08-05 NOTE — TELEPHONE ENCOUNTER
From: Collette Venegas  To: Amy Santana  Sent: 8/5/2024 1:16 PM CDT  Subject: Yeast pill    Hello I’ve been dealing with another yeast infection for the past month. I’ve been taking probiotics like you mentioned. I bought the AZO pills and used Monistat. Its not working. Can you send over to [place] those pills that you’ve given me before. Thanks

## 2024-08-05 NOTE — TELEPHONE ENCOUNTER
Message to Dr. Ch on call in Dr. Max duckworth. Patient requesting Diflucan for yeast symptoms.   Last annual was 01/2024.

## 2024-08-29 ENCOUNTER — OFFICE VISIT (OUTPATIENT)
Dept: ENDOCRINOLOGY CLINIC | Facility: CLINIC | Age: 39
End: 2024-08-29

## 2024-08-29 VITALS
HEIGHT: 63 IN | BODY MASS INDEX: 27.82 KG/M2 | SYSTOLIC BLOOD PRESSURE: 110 MMHG | HEART RATE: 69 BPM | DIASTOLIC BLOOD PRESSURE: 76 MMHG | WEIGHT: 157 LBS

## 2024-08-29 DIAGNOSIS — E11.65 UNCONTROLLED TYPE 2 DIABETES MELLITUS WITH HYPERGLYCEMIA (HCC): Primary | ICD-10-CM

## 2024-08-29 LAB
GLUCOSE BLOOD: 207
HEMOGLOBIN A1C: 8.6 % (ref 4.3–5.6)
TEST STRIP LOT #: NORMAL NUMERIC

## 2024-08-29 PROCEDURE — 82947 ASSAY GLUCOSE BLOOD QUANT: CPT | Performed by: INTERNAL MEDICINE

## 2024-08-29 PROCEDURE — 83036 HEMOGLOBIN GLYCOSYLATED A1C: CPT | Performed by: INTERNAL MEDICINE

## 2024-08-29 PROCEDURE — 99214 OFFICE O/P EST MOD 30 MIN: CPT | Performed by: INTERNAL MEDICINE

## 2024-08-29 RX ORDER — DAPAGLIFLOZIN 10 MG/1
10 TABLET, FILM COATED ORAL DAILY
Qty: 90 TABLET | Refills: 1 | Status: SHIPPED | OUTPATIENT
Start: 2024-08-29

## 2024-08-29 RX ORDER — FLUCONAZOLE 150 MG/1
150 TABLET ORAL WEEKLY
Qty: 4 TABLET | Refills: 1 | Status: SHIPPED | OUTPATIENT
Start: 2024-08-29

## 2024-08-29 RX ORDER — DULAGLUTIDE 1.5 MG/.5ML
1.5 INJECTION, SOLUTION SUBCUTANEOUS WEEKLY
Qty: 12 EACH | Refills: 1 | Status: SHIPPED | OUTPATIENT
Start: 2024-08-29

## 2024-08-29 NOTE — PROGRESS NOTES
Name: Collette Venegas  Date: 8/29/2024    Referring Physician: No ref. provider found    HISTORY OF PRESENT ILLNESS   Collette Venegas is a 39 year old female who presents for diabetes mellitus, diagnosed 7 years ago.      She had gestational diabetes with several pregnancies and progressed after delivery of 3rd baby.        Prior HbA, C or glycohemoglobin were 11.6% 7/2020; 10.3% 10/2020; 10.0% 7/2022; 8.7% 9/2022; 9.5% 1/2023; 8.0% 5/2023; 7.5% 7/2023; 7.5% 8/2023; 8.2% 12/2023; 8.3% 4/2024; 8.6% POC Today     Dietary compliance: Fair -->she has not undergone any recent education; she tries to watch CHO intake; no pop or juice   Exercise: No  Polyuria/polydipsia: No  Blurred vision: No    Episodes of hypoglycemia: No  Blood Glucose:  Post prandial: 200-220    Medications for DM   Glimepiride 4mg PO daily   Farxiga 10mg PO daily -->stopped for the past week    Trulicity stopped due to backorder     Insulin stopped since no longer pregnant   Glipizide stopped because she doesn't want to be dependent   Metformin d/c'd due to GI distress     REVIEW OF SYSTEMS  Eyes: Diabetic retinopathy present: No            Most recent visit to eye doctor in last 12 months: Yes    CV: Cardiovascular disease present: No         Hypertension present: No         Hyperlipidemia present: Yes         Peripheral Vascular Disease present: No    : Nephropathy present: No    Neuro: Neuropathy present: No    Skin: Infection or ulceration: No    Osteoporosis: No    Thyroid disease: No      Medications:     Current Outpatient Medications:     fluconazole (DIFLUCAN) 150 MG Oral Tab, Take 1 tablet (150 mg total) by mouth every third day., Disp: 2 tablet, Rfl: 0    Dulaglutide (TRULICITY) 3 MG/0.5ML Subcutaneous Solution Pen-injector, Inject 3 mg into the skin once a week., Disp: 6 mL, Rfl: 1    glimepiride 4 MG Oral Tab, Take 1 tablet (4 mg total) by mouth every morning before breakfast., Disp: 90 tablet, Rfl: 1    Glucose Blood (ONETOUCH  VERIO) In Vitro Strip, Check 4 times per day, Disp: 400 strip, Rfl: 1    Continuous Blood Gluc Sensor (DEXCOM G7 SENSOR) Does not apply Misc, 1 each Every 10 days. Use as directed every 10 days, Disp: 3 each, Rfl: 2    Insulin Lispro, 1 Unit Dial, 100 UNIT/ML Subcutaneous Solution Pen-injector, Inject 10 units 3x daily with meals, Disp: 15 mL, Rfl: 2    Insulin Pen Needle (PEN NEEDLES) 32G X 4 MM Does not apply Misc, 1 each in the morning, at noon, in the evening, and at bedtime. Inject 4x daily, Disp: 150 each, Rfl: 2    insulin glargine (LANTUS SOLOSTAR) 100 UNIT/ML Subcutaneous Solution Pen-injector, Inject 30 units daily, Disp: 15 mL, Rfl: 2    dapagliflozin (FARXIGA) 10 MG Oral Tab, Take 1 tablet (10 mg total) by mouth daily., Disp: 90 tablet, Rfl: 0    Lancets (ONETOUCH DELICA PLUS MUSQSS07R) Does not apply Misc, 1 Device in the morning, at noon, in the evening, and at bedtime., Disp: 400 each, Rfl: 1    Insulin Pen Needle (BD PEN NEEDLE BRIDGET U/F) 32G X 4 MM Does not apply Misc, Inject 4 times per day (Patient not taking: Reported on 3/20/2024), Disp: 400 each, Rfl: 1    Continuous Blood Gluc Sensor (DEXCOM G7 SENSOR) Does not apply Misc, 1 Device Every 10 days. (Patient not taking: Reported on 3/7/2024), Disp: 3 each, Rfl: 5    Blood Glucose Monitoring Suppl (ONETOUCH VERIO) w/Device Does not apply Kit, 1 Device daily. (Patient not taking: Reported on 3/7/2024), Disp: 1 kit, Rfl: 0    Lancets (ONETOUCH DELICA PLUS [id number]) Does not apply Misc, 1 Device daily., Disp: 100 each, Rfl: 1     Allergies:   No Known Allergies    Social History:   Social History     Socioeconomic History    Marital status:    Tobacco Use    Smoking status: Former     Types: Cigarettes    Smokeless tobacco: Never    Tobacco comments:     Quit 2011   Vaping Use    Vaping status: Never Used   Substance and Sexual Activity    Alcohol use: Not Currently    Drug use: No    Sexual activity: Yes   Other Topics Concern    Caffeine  Concern No    Exercise Yes    Seat Belt Yes    Special Diet No    Stress Concern No    Weight Concern No       Medical History:   Past Medical History:    Anxiety    Decorative tattoo    Diabetes (HCC)    Human papilloma virus infection    No diabetic retinopathy in both eyes       Surgical history:   Past Surgical History:   Procedure Laterality Date    Colposcopy,bx cervix/endocerv curr           PHYSICAL EXAM  /76   Pulse 69   Ht 5' 3\" (1.6 m)   Wt 157 lb (71.2 kg)   LMP 06/03/2024 (Approximate)   BMI 27.81 kg/m²     General Appearance:  alert, well developed, in no acute distress  Eyes:  normal conjunctivae, sclera., normal sclera and normal pupils  Ears/Nose/Mouth/Throat/Neck:  no palpable thyroid nodules or cervical lymphadenopathy  Back: no kyphosis or back tenderness  Musculoskeletal:  normal muscle strength and tone  Skin:  normal moisture and skin texture  Hair & Nails:  normal scalp hair     Hematologic:  no excessive bruising  Neuro:  sensory grossly intact and motor grossly intact  Psychiatric:  oriented to time, self, and place  Nutritional:  no abnormal weight gain or loss      ASSESSMENT/PLAN:      1. Diabetes Mellitus Type 2, Uncontrolled  -Uncontrolled; HgA1c 8.6% -->slight increase  -She has been off several of her medications leading to higher BG levels   -Discussed importance of glycemic control to prevent complications of diabetes  -Discussed complications of diabetes include retinopathy, neuropathy, nephropathy and cardiovascular disease  -Discussed ABCs of DM  -Discussed importance of SBGM  -Discussed importance of low CHO diet, recommend 45gm per meal or 135gm per day  -Restart Farxiga 10mg PO daily   -Restart Truilcity 1.5mg subcutaneous weekly, verbalized understanding of risks and benefits   -Continue Glimepiride 4mg PO daily    -Start diflucan while on farxiga therapy, verbalized understanding of risks and benefits   -Discussed OCPs but she declined   -Normotensive  -Normal  foot exam performed 4/2024   -Normal labs     A total of 30 minutes was spent on obtaining history, reviewing pertinent labs, evaluating patient, providing multiple treatment options, reinforcing diet/exercise and compliance, and completing documentation.       RTC 4 months     8/29/2024  Rebecca Swan MD

## 2024-08-30 ENCOUNTER — TELEPHONE (OUTPATIENT)
Dept: ENDOCRINOLOGY CLINIC | Facility: CLINIC | Age: 39
End: 2024-08-30

## 2024-08-30 NOTE — TELEPHONE ENCOUNTER
Medication PA Requested:  (TRULICITY) 1.5 MG/0.5ML Subcutaneous Solution Pen                                                          CoverMyMeds Used: No  Key:  Quantity: 6mL  Day Supply: 90  Sig:  Inject 1.5 mg into the skin once a week.   DX Code:   E11.65        Electronic prior authorization submitted, last office visit 8/29 and A1C 8/29  Awaiting determination

## 2024-08-30 NOTE — TELEPHONE ENCOUNTER
Medication PA Requested:  (TRULICITY) 1.5 MG/0.5ML Subcutaneous Solution Pen                                                          CoverMyMeds Used:  Key:  Quantity: 6mL  Day Supply: 90  Sig:  Inject 1.5 mg into the skin once a week.   DX Code:   E11.65

## 2024-08-30 NOTE — TELEPHONE ENCOUNTER
Pharmacy requesting pa for       Dulaglutide (TRULICITY) 1.5 MG/0.5ML Subcutaneous Solution Pen-injector, Inject 1.5 mg into the skin once a week., Disp: 12 each, Rfl: 1    KEY: [id number]

## 2024-08-30 NOTE — TELEPHONE ENCOUNTER
Pharmacy requesting pa for       dapagliflozin (FARXIGA) 10 MG Oral Tab, Take 1 tablet (10 mg total) by mouth daily., Disp: 90 tablet, Rfl: 1\      KEY: [id number]

## 2024-09-03 NOTE — TELEPHONE ENCOUNTER
Medication PA Requested:   dapagliflozin (FARXIGA) 10 MG Oral Tab                                                        CoverMyMeds Used: No  Key:  Quantity: 90  Day Supply: 90  Sig: Take 1 tablet (10 mg total) by mouth daily   DX Code:   E11.65              Electronic prior authorization submitted, last office visit 8/29 and A1C 8/29  Awaiting determination

## 2024-09-03 NOTE — TELEPHONE ENCOUNTER
Medication PA Requested:   dapagliflozin (FARXIGA) 10 MG Oral Tab                                                        CoverMyMeds Used:  Key:  Quantity: 90  Day Supply: 90  Sig: Take 1 tablet (10 mg total) by mouth daily   DX Code:   E11.65

## 2024-09-03 NOTE — TELEPHONE ENCOUNTER
Spoke with [employer]. Notified ok to be for brand Farxiga. It is ready to be picked up as brand from the pharmacy. Nothing further needed.

## 2024-09-03 NOTE — TELEPHONE ENCOUNTER
Jessica/RODRIGUE called to find out if Dr. Swan would prefer brand or generic.   Please call.      Ref # is [id number]

## 2024-10-13 ENCOUNTER — HOSPITAL ENCOUNTER (EMERGENCY)
Facility: HOSPITAL | Age: 39
Discharge: HOME | End: 2024-10-14
Attending: EMERGENCY MEDICINE
Payer: MEDICAID

## 2024-10-13 VITALS
HEART RATE: 82 BPM | OXYGEN SATURATION: 97 % | RESPIRATION RATE: 20 BRPM | SYSTOLIC BLOOD PRESSURE: 123 MMHG | TEMPERATURE: 98 F | DIASTOLIC BLOOD PRESSURE: 82 MMHG

## 2024-10-13 DIAGNOSIS — L02.414 ABSCESS OF LEFT ARM: Primary | ICD-10-CM

## 2024-10-13 DIAGNOSIS — L03.114 CELLULITIS OF LEFT ARM: ICD-10-CM

## 2024-10-13 LAB
B-HCG UR QL: NEGATIVE
GLUCOSE BLDC GLUCOMTR-MCNC: 104 MG/DL (ref 70–99)

## 2024-10-13 PROCEDURE — 10061 I&D ABSCESS COMP/MULTIPLE: CPT

## 2024-10-13 PROCEDURE — 82962 GLUCOSE BLOOD TEST: CPT

## 2024-10-13 PROCEDURE — 99283 EMERGENCY DEPT VISIT LOW MDM: CPT

## 2024-10-13 PROCEDURE — 99284 EMERGENCY DEPT VISIT MOD MDM: CPT

## 2024-10-13 PROCEDURE — 81025 URINE PREGNANCY TEST: CPT

## 2024-10-13 RX ORDER — IBUPROFEN 600 MG/1
600 TABLET, FILM COATED ORAL ONCE
Status: COMPLETED | OUTPATIENT
Start: 2024-10-13 | End: 2024-10-13

## 2024-10-13 RX ORDER — SULFAMETHOXAZOLE AND TRIMETHOPRIM 800; 160 MG/1; MG/1
1 TABLET ORAL 2 TIMES DAILY
Qty: 14 TABLET | Refills: 0 | Status: SHIPPED | OUTPATIENT
Start: 2024-10-13 | End: 2024-10-20

## 2024-10-13 RX ORDER — HYDROCODONE BITARTRATE AND ACETAMINOPHEN 5; 325 MG/1; MG/1
1 TABLET ORAL ONCE
Status: COMPLETED | OUTPATIENT
Start: 2024-10-13 | End: 2024-10-13

## 2024-10-14 RX ORDER — IBUPROFEN 600 MG/1
600 TABLET, FILM COATED ORAL EVERY 6 HOURS PRN
Qty: 28 TABLET | Refills: 0 | Status: SHIPPED | OUTPATIENT
Start: 2024-10-14 | End: 2024-10-21

## 2024-10-14 NOTE — ED PROVIDER NOTES
Patient Seen in: [facility] Emergency Department    History     Chief Complaint   Patient presents with    Abscess     Stated Complaint: Abcess in arm    HPI  Patient is a pleasant 39F hx of DM complains of skin infection for the past week. Pt sts has had a cyst to the posterior left arm for years, and it has seemed to grow over the past month. However hast just now over the past week become painful, red, and more swollen. Denies fever, chills, body aches, vomiting, or other complaints.  Compliant with her DM medications, not on insulin. Sts has dermatology appointment for the cyst on 10/29 but concern for infection brought her to ED tonight.     Past Medical History:    Anxiety    Decorative tattoo    Diabetes (HCC)    Human papilloma virus infection    No diabetic retinopathy in both eyes       Past Surgical History:   Procedure Laterality Date    Colposcopy,bx cervix/endocerv curr              Family History   Problem Relation Age of Onset    Diabetes Father     No Known Problems Mother     No Known Problems Son     No Known Problems Son     No Known Problems Son     Diabetes Other         paternal side       Social History     Socioeconomic History    Marital status:    Tobacco Use    Smoking status: Former     Types: Cigarettes    Smokeless tobacco: Never    Tobacco comments:     Quit 2011   Vaping Use    Vaping status: Never Used   Substance and Sexual Activity    Alcohol use: Not Currently    Drug use: No    Sexual activity: Yes   Other Topics Concern    Caffeine Concern No    Exercise Yes    Seat Belt Yes    Special Diet No    Stress Concern No    Weight Concern No   Social History Narrative    ** Merged History Encounter **            Review of Systems    Positive for stated complaint: Abcess in arm  Other systems are as noted in HPI.  Constitutional and vital signs reviewed.      All other systems reviewed and negative except as noted above.    PSFH elements reviewed from today and agreed  except as otherwise stated in HPI.    Physical Exam     ED Triage Vitals [10/13/24 2127]   /82   Pulse 82   Resp 20   Temp 98.2 °F (36.8 °C)   Temp src    SpO2 97 %   O2 Device None (Room air)       Current:/82   Pulse 82   Temp 98.2 °F (36.8 °C)   Resp 20   LMP 10/12/2024 (Approximate)   SpO2 97%       GENERAL: awake alert no distress   HEENT: EOMI, MMM no lesions  EXTREMITIES: approx 4x4 cm tender, indurated mass to posterior left arm with overlying erythema. Bedside US reveals fluid and material-filled well circumscribed circular mass. Otherwise however no lymphangitis or circumferential erythema, no crepitance or necrotic tissue noted. Compartments soft in L arm. +2 radial pulse in LUE  CV: RRR  NEURO: alert, oriented x 3, no focal deficits appreciated  SKIN:  see extremity exam.   PSYCH: calm, cooperative,          ED Course     Labs Reviewed   POCT GLUCOSE - Abnormal; Notable for the following components:       Result Value    POC Glucose  104 (*)     All other components within normal limits   POCT PREGNANCY URINE - Normal       MDM       39F with known cyst to posterior left arm with increased redness, swelling, and pain x1 week. Afebrile normal vitals here in the ED, pt well appearing no distress. Exam as above    Ddx most likely for infected cyst with abscess and surrounding cellulitis. No lymphangitis. No fever or other systemic sx to suggest sepsis. Low suspicion for DKA.     Plan: urine preg, accuchek, I&D, anticipate DC with antibiotics and close follow up with derm as previously scheduled later this month       Procedures:    Abscess drainage:  The patient abscess was located to left posterior arm.   I obtained verbal consent from the patient to drain the abscess who was informed about the possibility of bleeding, pain and worsening of the condition.  The abscess was incised with a scalpel  and a moderate amount of purulent drainage and keratinaceous material was expressed.  I  irrigated the wound and placed some packing.  The patient tolerated the procedure well.  The procedure was performed by myself.        Pt tolerated procedure well. Accuchek normal. Upreg negative. Bactrim 7d course sent to pharmacy. Counseled pt return if new/worsening sx and packing removal in 48 hours either can do herself or come to this or any other ED or UC to have this done. Pt verbalized understanding comfortable with DC plan.     Disposition and Plan     Clinical Impression:  1. Abscess of left arm    2. Cellulitis of left arm        Disposition:  Discharge    Follow-up:  Mikhail Zamorano MD  [address]  [address]  [facility] [id number] [phone]    Schedule an appointment as soon as possible for a visit in 2 day(s)      [facility] Emergency Department  [address] [address]  [phone]  Go to  If symptoms worsen, immediately    Shreyas Benoit MD  [address]  [address]  [facility] [address]  [phone]    Schedule an appointment as soon as possible for a visit in 1 week(s)  As needed if unable to follow up with dermatology    Vu Alvarado MD  [address]  [address]  [facility] [address] [phone]    Schedule an appointment as soon as possible for a visit in 1 week(s)  As needed if unable to follow up or keep your previously scheduled Derm appointment for 10/29/2024      Medications Prescribed:  Discharge Medication List as of 10/14/2024 12:08 AM        START taking these medications    Details   ibuprofen 600 MG Oral Tab Take 1 tablet (600 mg total) by mouth every 6 (six) hours as needed., Normal, Disp-28 tablet, R-0      sulfamethoxazole-trimethoprim -160 MG Oral Tab per tablet Take 1 tablet by mouth 2 (two) times daily for 7 days., Normal, Disp-14 tablet, R-0           Marleny Rice DO  Attending Physician  Emergency Medicine

## 2024-10-14 NOTE — DISCHARGE INSTRUCTIONS
Thank you for seeking care at [facility] Emergency Department.  You have been seen and evaluated and treated for an abscess.     We discussed the results of your evaluation and work-up in the emergency department   Please read the instructions provided   If given prescriptions, take as instructed   Please keep the area clean, and wash the area gently with mild soap and warm water twice daily.    If your wound was packed with gauze, this should be removed or exchanged after 48 hours. Leaving this packing in place longer than 48 hours may lead to worse infection. Follow directions as was instructed.    Not all abscesses require antibiotics, but if antibiotics were prescribed, please complete the course of treatment as directed.    Remember, your care process does not end after your visit today. Please follow-up with your doctor within 1-2 days for a follow-up check to ensure your symptoms are improving, to see if you need any further evaluation/testing, or to evaluate for any alternate diagnoses.     If you notice increasing pain, increasing redness around the wound, increasing drainage, feeling lightheaded or dizzy, heavy bleeding, begin having fevers or chills, or feeling very ill you should contact your doctor or return to the emergency department.

## 2024-10-15 ENCOUNTER — HOSPITAL ENCOUNTER (EMERGENCY)
Facility: HOSPITAL | Age: 39
Discharge: HOME | End: 2024-10-15
Attending: STUDENT IN AN ORGANIZED HEALTH CARE EDUCATION/TRAINING PROGRAM
Payer: MEDICAID

## 2024-10-15 ENCOUNTER — PATIENT MESSAGE (OUTPATIENT)
Dept: INTERNAL MEDICINE CLINIC | Facility: CLINIC | Age: 39
End: 2024-10-15

## 2024-10-15 VITALS
OXYGEN SATURATION: 99 % | BODY MASS INDEX: 27.82 KG/M2 | SYSTOLIC BLOOD PRESSURE: 102 MMHG | DIASTOLIC BLOOD PRESSURE: 70 MMHG | TEMPERATURE: 97 F | RESPIRATION RATE: 18 BRPM | HEART RATE: 80 BPM | HEIGHT: 63 IN | WEIGHT: 157 LBS

## 2024-10-15 DIAGNOSIS — H43.393 VITREOUS FLOATERS OF BOTH EYES: Primary | ICD-10-CM

## 2024-10-15 DIAGNOSIS — Z51.89 WOUND CHECK, ABSCESS: Primary | ICD-10-CM

## 2024-10-15 DIAGNOSIS — E11.9 TYPE 2 DIABETES MELLITUS WITHOUT COMPLICATION, WITHOUT LONG-TERM CURRENT USE OF INSULIN (HCC): ICD-10-CM

## 2024-10-15 PROCEDURE — 99281 EMR DPT VST MAYX REQ PHY/QHP: CPT

## 2024-10-15 NOTE — TELEPHONE ENCOUNTER
Overdogt message sent to pt w/ referral to dr Сергей Pinon/ Ophthalmology for diabetic eye exam.  Referral was placed to this specialist in 2023 and does not appear pt went for exam.  New referral placed today.

## 2024-10-15 NOTE — ED INITIAL ASSESSMENT (HPI)
Pt presents to ed with c/o packing removal. Pt states she had a cyst drained on sunday and told to come back to ed in 2 days for re-eval.     Denies fever.

## 2024-10-15 NOTE — DISCHARGE INSTRUCTIONS
Return to the emergency department if you develop significant pain or swelling at the site of  your infection, if you develop high fevers, nausea, vomiting, or diarrhea, or if the redness of your  skin is extending beyond the area where it is red today as these could be signs of worsening  infection requiring further medical care.

## 2024-10-29 ENCOUNTER — OFFICE VISIT (OUTPATIENT)
Dept: DERMATOLOGY CLINIC | Facility: CLINIC | Age: 39
End: 2024-10-29

## 2024-10-29 DIAGNOSIS — L72.0 EPIDERMOID CYST: Primary | ICD-10-CM

## 2024-10-29 PROCEDURE — 99204 OFFICE O/P NEW MOD 45 MIN: CPT | Performed by: STUDENT IN AN ORGANIZED HEALTH CARE EDUCATION/TRAINING PROGRAM

## 2024-10-29 RX ORDER — DOXYCYCLINE 100 MG/1
100 CAPSULE ORAL 2 TIMES DAILY
Qty: 28 CAPSULE | Refills: 2 | Status: SHIPPED | OUTPATIENT
Start: 2024-10-29

## 2024-10-29 NOTE — PROGRESS NOTES
New Patient    Referred by: No referring provider defined for this encounter.    CHIEF COMPLAINT: Lesion of concern     HISTORY OF PRESENT ILLNESS: Collette Venegas is a 39 year old female here for evaluation of lesion of concern.    1. Growth   Location: L upper arm  Duration: 5 years  Signs and symptoms: Doubled in size, sensitive and drainage  Current treatment: Peroxide  Past treatments: None        Personal Dermatologic History  History of skin cancer: No  History of  atypical moles: No    FAMILY HISTORY:  History of melanoma: No    Past Medical History  Past Medical History:    Anxiety    Decorative tattoo    Diabetes (HCC)    Human papilloma virus infection    No diabetic retinopathy in both eyes       REVIEW OF SYSTEMS:  Constitutional: Denies fever, chills, unintentional weight loss.   Skin as per HPI    Medications  Current Outpatient Medications   Medication Sig Dispense Refill    Dulaglutide (TRULICITY) 1.5 MG/0.5ML Subcutaneous Solution Pen-injector Inject 1.5 mg into the skin once a week. 12 each 1    dapagliflozin (FARXIGA) 10 MG Oral Tab Take 1 tablet (10 mg total) by mouth daily. 90 tablet 1    fluconazole (DIFLUCAN) 150 MG Oral Tab Take 1 tablet (150 mg total) by mouth once a week. 4 tablet 1    glimepiride 4 MG Oral Tab Take 1 tablet (4 mg total) by mouth every morning before breakfast. 90 tablet 1       PHYSICAL EXAM:  General: awake, alert, no acute distress  Neuropsych: appropriate mood and affect  Eyes: Sclerae anicteric, without conjunctival injection, eyelids unremarkable  Skin: Skin exam was performed today including the following: Left upper arm  Pertinent findings include:   - with incision and drainage from incision.  -With underlying palpable subcutaneous nodule    ASSESSMENT & PLAN:  Pathophysiology of diagnoses discussed with patient.  Therapeutic options reviewed. Risks, benefits, and alternatives discussed with patient. Instructions reviewed at length.    #Inflamed epidermoid  cyst  - Recommended continuing doxycycline for additional 7 days.  Use twice daily and take with meals.  Patient to inform us with any side effects or concerns.  Risks, benefits, alternatives discussed  - Recommended seeing Dr. Jessica chiu for excision of cyst.    Return to clinic: As needed or sooner if something concerning arises     Dimitri Dunbar MD

## 2024-10-30 ENCOUNTER — OFFICE VISIT (OUTPATIENT)
Dept: SURGERY | Facility: CLINIC | Age: 39
End: 2024-10-30

## 2024-10-30 DIAGNOSIS — L72.0 INCLUSION CYST: Primary | ICD-10-CM

## 2024-10-30 PROCEDURE — 99204 OFFICE O/P NEW MOD 45 MIN: CPT | Performed by: PLASTIC SURGERY

## 2024-10-30 NOTE — H&P
Collette Venegas is a 39 year old female that presents with   Chief Complaint   Patient presents with    Lesion     L arm cyst   .    REFERRED BY:  Dimitri Dunbar    Pacemaker: No  Latex Allergy: no  Coumadin: No  Plavix: No  Other anticoagulants: No  Diet medication: Yes, Name:  Trulicity last dose 10/30/24, Farxiga last dose 10/30/24  Cardiac stents: No    HAND DOMINANCE:  Right  Profession:     RECONSTRUCTIVE HISTORY    SUN EXPOSURE   Current no   Past no   Sunburns no   Tanning salons current no   Tanning salons past no    SKIN CANCER    Personal history of skin cancer: none      HPI:       39-year-old female right-hand-dominant with a left arm cyst    5 years duration    Increased in size became red and infected    Emergency room 10/13, incision and drainage, and Bactrim    Some increase in drainage and foul odor recently    Now on doxycycline.  Feels better          Review of Systems:   Constitutional: No change in appetite, chill/rigors, or fatigue  GI: No jaundice  Endocrine: No generalized weakness  Neurological: No aphasia, loss of consciousness, or seizures       Integumentary:     LESION 1:    Location  L upper arm    Onset:   5 years    Pain:   no    Itching:   yes    Bleeding:  no    Infection:  yes    Size change?  Increased, doubled in size    Color change?  Increased in redness    Biopsy?:   No  Seen in ER on 10/13/24, lanced, prescribed Bactrim, finished course    Pt reported increase in yellowish/greenish drainage w/ foul odor recently, increase in pain/redness  Saw Dr Dunbar 10/30/24, started doxycycline 10/29/24, pt reports decrease in pain    Pt last dose Trulicity and Farxiga on 10/30/24  PMH:     MEDICAL  Past Medical History:    Anxiety    Decorative tattoo    Diabetes (HCC)    Human papilloma virus infection    No diabetic retinopathy in both eyes        SURGICAL  Past Surgical History:   Procedure Laterality Date    Colposcopy,bx cervix/endocerv curr           ALLERIGIES  Allergies[1]     MEDICATIONS  Current Outpatient Medications   Medication Sig Dispense Refill    doxycycline 100 MG Oral Cap Take 1 capsule (100 mg total) by mouth 2 (two) times daily. 28 capsule 2    Dulaglutide (TRULICITY) 1.5 MG/0.5ML Subcutaneous Solution Pen-injector Inject 1.5 mg into the skin once a week. 12 each 1    dapagliflozin (FARXIGA) 10 MG Oral Tab Take 1 tablet (10 mg total) by mouth daily. 90 tablet 1    glimepiride 4 MG Oral Tab Take 1 tablet (4 mg total) by mouth every morning before breakfast. 90 tablet 1        SOCIAL HISTORY  Social History     Socioeconomic History    Marital status:    Tobacco Use    Smoking status: Former     Types: Cigarettes    Smokeless tobacco: Never    Tobacco comments:     Quit 2011   Vaping Use    Vaping status: Never Used   Substance and Sexual Activity    Alcohol use: Not Currently    Drug use: No    Sexual activity: Yes   Other Topics Concern    Caffeine Concern No    Exercise Yes    Seat Belt Yes    Special Diet No    Stress Concern No    Weight Concern No    Reaction to local anesthetic No   Social History Narrative    ** Merged History Encounter **             FAMILY HISTORY  Family History   Problem Relation Age of Onset    Diabetes Father     No Known Problems Mother     No Known Problems Son     No Known Problems Son     No Known Problems Son     Diabetes Other         paternal side          PHYSICAL EXAM:     CONSTITUTIONAL: Overall appearance - Normal  HEENT: Normocephalic  EYES: Conjunctiva - Right: Normal, Left: Normal; EOMI  EARS: Inspection - Right: Normal, Left: Normal  NECK/THYROID: Inspection - Normal, Palpation - Normal, Thyroid gland - Normal, No adenopathy  RESPIRATORY: Inspection - Normal, Effort - Normal  CARDIOVASCULAR: Regular rhythm, No murmurs  ABDOMEN: Inspection - Normal, No abdominal tenderness  NEURO: Memory intact  PSYCH: Oriented to person, place, time, and situation, Appropriate mood and affect      Integument  Physical Exam:       Left arm: Crusted wound  2 drops purulence, then serosanguineous      ASSESSMENT/PLAN:     IMPRESSION:    Left arm inclusion cyst, infection subsiding        We had a long discussion.  I explained to the patient the nature of this lesion / these lesions, as well as treatment options.  Finish doxycycline    Allow inflammation to subside, then consider excision      10/30/2024  Brock Valdivia MD        +++++++++++++++++++++++++++++++++++++++++      MEDICAL DECISION MAKING    PROBLEMS      MODERATE    (number / complexity)          Undiagnosed new illness with uncertain prognosis    DATA         STRAIGHTFORWARD    (amount / complexity)           MANAGEMENT RISK  MODERATE    (complications/ morbidity)       Doxycycline                  MDM LEVEL    MODERATE           [1] No Known Allergies

## 2024-11-01 ENCOUNTER — OFFICE VISIT (OUTPATIENT)
Dept: INTERNAL MEDICINE CLINIC | Facility: CLINIC | Age: 39
End: 2024-11-01
Payer: MEDICAID

## 2024-11-01 VITALS
DIASTOLIC BLOOD PRESSURE: 66 MMHG | HEIGHT: 63 IN | SYSTOLIC BLOOD PRESSURE: 108 MMHG | HEART RATE: 72 BPM | RESPIRATION RATE: 18 BRPM | WEIGHT: 155.81 LBS | BODY MASS INDEX: 27.61 KG/M2

## 2024-11-01 DIAGNOSIS — L72.3 INFECTED SEBACEOUS CYST: ICD-10-CM

## 2024-11-01 DIAGNOSIS — K21.9 GASTROESOPHAGEAL REFLUX DISEASE, UNSPECIFIED WHETHER ESOPHAGITIS PRESENT: ICD-10-CM

## 2024-11-01 DIAGNOSIS — L08.9 INFECTED SEBACEOUS CYST: ICD-10-CM

## 2024-11-01 DIAGNOSIS — E11.65 TYPE 2 DIABETES MELLITUS WITH HYPERGLYCEMIA, WITHOUT LONG-TERM CURRENT USE OF INSULIN (HCC): Primary | ICD-10-CM

## 2024-11-01 LAB — HEMOGLOBIN A1C: 7.5 % (ref 4.3–5.6)

## 2024-11-01 PROCEDURE — 99214 OFFICE O/P EST MOD 30 MIN: CPT | Performed by: INTERNAL MEDICINE

## 2024-11-01 PROCEDURE — 83036 HEMOGLOBIN GLYCOSYLATED A1C: CPT | Performed by: INTERNAL MEDICINE

## 2024-11-01 NOTE — PROGRESS NOTES
Chief complaint and HPI      Collette Venegas  39 year old female     Last vist last nov     Chief Complaint: DM,  seb cyst  , gerd       Discussed diabetes management ; diet and medications  -   Last A1c value was 7.5% done 11/1/2024.  Checks  rarely -  - last time  3 weeks ago -  only in am - was off trulicity for a while       Last Foot Exam: 11/01/24 (nl foot exam)  Malb/Cre Calc   Date Value Ref Range Status   03/06/2024   Final     Comment:     Unable to calculate due to Urine Microalbumin <0.3 mg/dL       05/18/2023 7.5 <=30.0 ug/mg Final     Comment:     <30 ug/mg creatinine       Normal     ug/mg creatinine   Microalbuminuria   >300 ug/mg creatinine      Albuminuria         Lab Results   Component Value Date     (H) 03/11/2024    BUN 15 03/11/2024    CREATSERUM 0.64 03/11/2024    BUNCREA 23.4 (H) 03/11/2024    ANIONGAP 5 03/11/2024    GFRAA 133 06/13/2022    GFRNAA 116 06/13/2022    CA 9.0 03/11/2024     03/11/2024    K 4.0 03/11/2024     03/11/2024    CO2 27.0 03/11/2024    OSMOCALC 293 03/11/2024     Blood Sugar Medications            Dulaglutide (TRULICITY) 1.5 MG/0.5ML Subcutaneous Solution Pen-injector    dapagliflozin (FARXIGA) 10 MG Oral Tab    glimepiride 4 MG Oral Tab                      Discussed lipid control for cardiac prevention.no  meds now   Lab Results   Component Value Date    CHOLEST 135 03/06/2024    TRIG 70 03/06/2024    HDL 35 (L) 03/06/2024    LDL 86 03/06/2024    VLDL 11 03/06/2024    NONHDLC 100 03/06/2024       Has  seb cyst left arm  - on doxy  saw plastics -  no fever  or chills  still some drainage       C/o GERD sx         EXAM:     Vitals:    11/01/24 0852   BP: 108/66   Pulse: 72   Resp: 18   VITALSBody mass index is 27.6 kg/m².       Looks healthy  nl affect      Seb cyst  on left  arm -  sl discharge  no cellulits      Neck ; no sig findings    Heart exam; regular     Lungs; no rhonchi or rales     Ext ; no edema     Bilateral barefoote appearance  is normal.  Bilateral monofilament/sensation of both feet is normal.  Pedal pulse exam of both lower feet is normal.      Collette was seen today for physical.    Diagnoses and all orders for this visit:    Type 2 diabetes mellitus with hyperglycemia, without long-term current use of insulin (HCC)  -     POC Hemoglobin A1C    Infected sebaceous cyst         Discussed  DM  and  need to watch  diet better -  admits to potion sz  issues  and  needs to check  sugars -   showed her A1c  scale -        Discussed gerd and glp meds     Discussed diet change       Cyst  healing needs tight control  - then f/u  surgery           This note was prepared using Dragon Medical voice recognition dictation software and as a result, errors may occur. When identified, these errors have been corrected. While every attempt is made to correct errors during dictation, discrepancies may still exist       Mikhail Zamorano MD

## 2024-11-06 RX ORDER — GLIMEPIRIDE 4 MG/1
4 TABLET ORAL
Qty: 90 TABLET | Refills: 1 | Status: SHIPPED | OUTPATIENT
Start: 2024-11-06

## 2024-11-06 NOTE — TELEPHONE ENCOUNTER
Endocrine Refill protocol for oral and injectable diabetic medications    Protocol Criteria:  PASSED  Reason: N/A    If all below requirements are met, send a 90-day supply with 1 refill per provider protocol.    Verify appointment with Endocrinology completed in the last 6 months or scheduled in the next 3 months.  Verify A1C has been completed within the last 6 months and is below 8.5%     Last completed office visit: 8/29/2024 Rebecca Swan MD   Next scheduled Follow up:   Future Appointments   Date Time Provider Department Center   1/24/2025  8:45 AM Rebecca Swan MD [facility]      Last A1c result: Last A1c value was 7.5% done 11/1/2024.    90 day +1 refill pending

## 2024-12-27 ENCOUNTER — PATIENT MESSAGE (OUTPATIENT)
Dept: INTERNAL MEDICINE CLINIC | Facility: CLINIC | Age: 39
End: 2024-12-27

## 2025-01-24 ENCOUNTER — OFFICE VISIT (OUTPATIENT)
Dept: ENDOCRINOLOGY CLINIC | Facility: CLINIC | Age: 40
End: 2025-01-24
Payer: MEDICAID

## 2025-01-24 VITALS
WEIGHT: 153 LBS | BODY MASS INDEX: 27 KG/M2 | DIASTOLIC BLOOD PRESSURE: 78 MMHG | HEART RATE: 72 BPM | SYSTOLIC BLOOD PRESSURE: 116 MMHG

## 2025-01-24 DIAGNOSIS — E11.65 UNCONTROLLED TYPE 2 DIABETES MELLITUS WITH HYPERGLYCEMIA (HCC): Primary | ICD-10-CM

## 2025-01-24 LAB
GLUCOSE BLOOD: 105
HEMOGLOBIN A1C: 7.4 % (ref 4.3–5.6)
TEST STRIP LOT #: NORMAL NUMERIC

## 2025-01-24 PROCEDURE — 83036 HEMOGLOBIN GLYCOSYLATED A1C: CPT | Performed by: INTERNAL MEDICINE

## 2025-01-24 PROCEDURE — 82947 ASSAY GLUCOSE BLOOD QUANT: CPT | Performed by: INTERNAL MEDICINE

## 2025-01-24 PROCEDURE — 99214 OFFICE O/P EST MOD 30 MIN: CPT | Performed by: INTERNAL MEDICINE

## 2025-01-24 RX ORDER — DULAGLUTIDE 3 MG/.5ML
3 INJECTION, SOLUTION SUBCUTANEOUS WEEKLY
Qty: 6 ML | Refills: 1 | Status: SHIPPED | OUTPATIENT
Start: 2025-01-24

## 2025-01-24 NOTE — PROGRESS NOTES
Name: Collette Venegas  Date: 1/24/2025    Referring Physician: No ref. provider found    HISTORY OF PRESENT ILLNESS   Collette Venegas is a 39 year old female who presents for diabetes mellitus, diagnosed 7 years ago.      She had gestational diabetes with several pregnancies and progressed after delivery of 3rd baby.      Prior HbA, C or glycohemoglobin were 11.6% 7/2020; 10.3% 10/2020; 10.0% 7/2022; 8.7% 9/2022; 9.5% 1/2023; 8.0% 5/2023; 7.5% 7/2023; 7.5% 8/2023; 8.2% 12/2023; 8.3% 4/2024; 8.6% 8/2024; 7.5% 11/2024; 7.4% POC Today     Dietary compliance: Fair -->she has not undergone any recent education; she tries to watch CHO intake; no pop or juice   Exercise: No  Polyuria/polydipsia: No  Blurred vision: No    Episodes of hypoglycemia: No  Blood Glucose:  Not checking     Medications for DM   Glimepiride 4mg PO daily   Farxiga 10mg PO daily  Trulicity 1.5mg subcutaneous weekly   -->she admits to skipping doses while in [place] for 10 days 2 weeks ago    Insulin stopped since no longer pregnant   Glipizide stopped because she doesn't want to be dependent   Metformin d/c'd due to GI distress     REVIEW OF SYSTEMS  Eyes: Diabetic retinopathy present: No            Most recent visit to eye doctor in last 12 months: Yes    CV: Cardiovascular disease present: No         Hypertension present: No         Hyperlipidemia present: Yes         Peripheral Vascular Disease present: No    : Nephropathy present: No    Neuro: Neuropathy present: No    Skin: Infection or ulceration: No    Osteoporosis: No    Thyroid disease: No      Medications:     Current Outpatient Medications:     GLIMEPIRIDE 4 MG Oral Tab, TAKE 1 TABLET(4 MG) BY MOUTH EVERY MORNING BEFORE BREAKFAST, Disp: 90 tablet, Rfl: 1    doxycycline 100 MG Oral Cap, Take 1 capsule (100 mg total) by mouth 2 (two) times daily., Disp: 28 capsule, Rfl: 2    Dulaglutide (TRULICITY) 1.5 MG/0.5ML Subcutaneous Solution Pen-injector, Inject 1.5 mg into the skin once a week.,  Disp: 12 each, Rfl: 1    dapagliflozin (FARXIGA) 10 MG Oral Tab, Take 1 tablet (10 mg total) by mouth daily., Disp: 90 tablet, Rfl: 1     Allergies:   No Known Allergies    Social History:   Social History     Socioeconomic History    Marital status:    Tobacco Use    Smoking status: Former     Types: Cigarettes    Smokeless tobacco: Never    Tobacco comments:     Quit 2011   Vaping Use    Vaping status: Never Used   Substance and Sexual Activity    Alcohol use: Not Currently    Drug use: No    Sexual activity: Yes   Other Topics Concern    Caffeine Concern No    Exercise Yes    Seat Belt Yes    Special Diet No    Stress Concern No    Weight Concern No    Reaction to local anesthetic No       Medical History:   Past Medical History:    Anxiety    Decorative tattoo    Diabetes (HCC)    Human papilloma virus infection    No diabetic retinopathy in both eyes       Surgical history:   Past Surgical History:   Procedure Laterality Date    Colposcopy,bx cervix/endocerv curr           PHYSICAL EXAM  /78   Pulse 72   Wt 153 lb (69.4 kg)   LMP 10/12/2024 (Approximate)   BMI 27.10 kg/m²     General Appearance:  alert, well developed, in no acute distress  Eyes:  normal conjunctivae, sclera., normal sclera and normal pupils  Ears/Nose/Mouth/Throat/Neck:  no palpable thyroid nodules or cervical lymphadenopathy  Back: no kyphosis or back tenderness  Musculoskeletal:  normal muscle strength and tone  Skin:  normal moisture and skin texture  Hair & Nails:  normal scalp hair     Hematologic:  no excessive bruising  Neuro:  sensory grossly intact and motor grossly intact  Psychiatric:  oriented to time, self, and place  Nutritional:  no abnormal weight gain or loss      ASSESSMENT/PLAN:      1. Diabetes Mellitus Type 2, Uncontrolled  -Uncontrolled; HgA1c 7.4% -->improved   -Discussed importance of glycemic control to prevent complications of diabetes  -Discussed complications of diabetes include retinopathy,  neuropathy, nephropathy and cardiovascular disease  -Discussed ABCs of DM  -Discussed importance of SBGM -->discussed checking at least 3 times per week   -Discussed importance of low CHO diet, recommend 45gm per meal or 135gm per day  -Continue Farxiga 10mg PO daily   -Increase Truilcity to 3mg subcutaneous weekly, verbalized understanding of risks and benefits   -Continue Glimepiride 4mg PO daily    -Discussed OCPs but she declined   -Normotensive  -Normal foot exam performed 4/2024   -Normal labs   -Recheck yearly labs    RTC 4 months     1/24/2025  Rebecca Swan MD

## 2025-01-27 ENCOUNTER — OFFICE VISIT (OUTPATIENT)
Dept: OBGYN CLINIC | Facility: CLINIC | Age: 40
End: 2025-01-27

## 2025-01-27 ENCOUNTER — LAB ENCOUNTER (OUTPATIENT)
Dept: LAB | Facility: HOSPITAL | Age: 40
End: 2025-01-27
Attending: OBSTETRICS & GYNECOLOGY
Payer: MEDICAID

## 2025-01-27 VITALS
HEART RATE: 81 BPM | WEIGHT: 154.63 LBS | HEIGHT: 63 IN | BODY MASS INDEX: 27.4 KG/M2 | SYSTOLIC BLOOD PRESSURE: 111 MMHG | DIASTOLIC BLOOD PRESSURE: 76 MMHG

## 2025-01-27 DIAGNOSIS — N92.6 MISSED MENSES: ICD-10-CM

## 2025-01-27 DIAGNOSIS — Z12.4 SCREENING FOR CERVICAL CANCER: ICD-10-CM

## 2025-01-27 DIAGNOSIS — Z01.419 WELL WOMAN EXAM WITH ROUTINE GYNECOLOGICAL EXAM: Primary | ICD-10-CM

## 2025-01-27 DIAGNOSIS — Z12.31 ENCOUNTER FOR SCREENING MAMMOGRAM FOR MALIGNANT NEOPLASM OF BREAST: ICD-10-CM

## 2025-01-27 LAB — B-HCG SERPL-ACNC: <2.6 MIU/ML

## 2025-01-27 PROCEDURE — 84702 CHORIONIC GONADOTROPIN TEST: CPT

## 2025-01-27 PROCEDURE — 36415 COLL VENOUS BLD VENIPUNCTURE: CPT

## 2025-01-27 PROCEDURE — 99395 PREV VISIT EST AGE 18-39: CPT | Performed by: OBSTETRICS & GYNECOLOGY

## 2025-01-27 NOTE — PROGRESS NOTES
Collette Venegas is a 39 year old female  Patient's last menstrual period was 2024 (approximate).   Chief Complaint   Patient presents with    Gyn Exam     ANNUAL EXAM, IRREGULAR PERIODS   Presenting for well woman exam. She had LEEP 2024 for LAUREN 2 with negative margins. She reports a light menses in December and has not yet had menses this month. Is hoping to conceive.     OBSTETRICS HISTORY:  OB History    Para Term  AB Living   7 3 3 0 3 3   SAB IAB Ectopic Multiple Live Births   2 0 0 0 3       GYNE HISTORY:  Patient's last menstrual period was 2024 (approximate).    History   Sexual Activity    Sexual activity: Yes     Comment: NONE        Pap Date: 24  Pap Result Notes: HSIL/HPV+ //Colpo 3/7/24 // Annual 24 EMB      MEDICAL HISTORY:  Past Medical History:    Anxiety    Decorative tattoo    Diabetes (HCC)    Human papilloma virus infection    No diabetic retinopathy in both eyes         SURGICAL HISTORY:  Past Surgical History:   Procedure Laterality Date    Colposcopy,bx cervix/endocerv curr         SOCIAL HISTORY:  Social History     Socioeconomic History    Marital status:      Spouse name: Not on file    Number of children: Not on file    Years of education: Not on file    Highest education level: Not on file   Occupational History    Not on file   Tobacco Use    Smoking status: Former     Types: Cigarettes    Smokeless tobacco: Never    Tobacco comments:     Quit    Vaping Use    Vaping status: Never Used   Substance and Sexual Activity    Alcohol use: Not Currently    Drug use: No    Sexual activity: Yes     Comment: NONE   Other Topics Concern    Caffeine Concern No    Exercise Yes    Seat Belt Yes    Special Diet No    Stress Concern No    Weight Concern No    Grew up on a farm Not Asked    History of tanning Not Asked    Outdoor occupation Not Asked    Breast feeding Not Asked    Reaction to local anesthetic No    Pt has a pacemaker Not Asked     Pt has a defibrillator Not Asked   Social History Narrative    ** Merged History Encounter **          Social Drivers of Health     Financial Resource Strain: Not on file   Food Insecurity: Not on file   Transportation Needs: Not on file   Physical Activity: Not on file   Stress: Not on file   Social Connections: Not on file   Housing Stability: Not on file         Depression Screening (PHQ-2/PHQ-9): Over the LAST 2 WEEKS   Little interest or pleasure in doing things: Not at all    Feeling down, depressed, or hopeless: Not at all    PHQ-2 SCORE: 0           MEDICATIONS:    Current Outpatient Medications:     Dulaglutide (TRULICITY) 3 MG/0.5ML Subcutaneous Solution Auto-injector, Inject 3 mg into the skin once a week., Disp: 6 mL, Rfl: 1    GLIMEPIRIDE 4 MG Oral Tab, TAKE 1 TABLET(4 MG) BY MOUTH EVERY MORNING BEFORE BREAKFAST, Disp: 90 tablet, Rfl: 1    doxycycline 100 MG Oral Cap, Take 1 capsule (100 mg total) by mouth 2 (two) times daily., Disp: 28 capsule, Rfl: 2    dapagliflozin (FARXIGA) 10 MG Oral Tab, Take 1 tablet (10 mg total) by mouth daily., Disp: 90 tablet, Rfl: 1    ALLERGIES:  Allergies[1]      Review of Systems:  Review of Systems   All other systems reviewed and are negative.       Vitals:    01/27/25 0906   BP: 111/76   Pulse: 81       PHYSICAL EXAM:   Physical Exam  Vitals reviewed.   Constitutional:       Appearance: Normal appearance.   HENT:      Head: Atraumatic.   Eyes:      Pupils: Pupils are equal, round, and reactive to light.   Pulmonary:      Effort: Pulmonary effort is normal.   Chest:   Breasts:     Right: Normal. No bleeding, inverted nipple, mass, nipple discharge, skin change or tenderness.      Left: Normal. No bleeding, inverted nipple, mass, nipple discharge, skin change or tenderness.   Abdominal:      General: Abdomen is flat.      Palpations: Abdomen is soft.      Tenderness: There is no abdominal tenderness.   Genitourinary:     General: Normal vulva.      Exam position:  Lithotomy position.      Labia:         Right: No rash, tenderness, lesion or injury.         Left: No rash, tenderness, lesion or injury.       Vagina: Normal.      Cervix: Normal.      Uterus: Normal. Not tender.       Adnexa: Right adnexa normal and left adnexa normal.        Right: No tenderness or fullness.          Left: No tenderness or fullness.     Lymphadenopathy:      Upper Body:      Right upper body: No supraclavicular, axillary or pectoral adenopathy.      Left upper body: No supraclavicular, axillary or pectoral adenopathy.   Skin:     General: Skin is warm and dry.   Neurological:      General: No focal deficit present.      Mental Status: She is alert and oriented to person, place, and time.   Psychiatric:         Mood and Affect: Mood normal.         Behavior: Behavior normal.         Thought Content: Thought content normal.         Judgment: Judgment normal.           Assessment & Plan:  Collette was seen today for gyn exam.    Diagnoses and all orders for this visit:    Well woman exam with routine gynecological exam    Encounter for screening mammogram for malignant neoplasm of breast  -     [facility] SETH 2D+3D SCREENING BILAT (CPT=77067/52767); Future    Missed menses  -     HCG, Beta Subunit (Quant Pregnancy Test); Future    Screening for cervical cancer  -     Cancel: ThinPrep PAP Smear; Future  -     Cancel: Hpv Dna  High Risk , Thin Prep Collect; Future  -     ThinPrep PAP Smear; Future  -     Hpv Dna  High Risk , Thin Prep Collect; Future  -     Hpv Dna  High Risk , Thin Prep Collect  -     ThinPrep PAP Smear        Requested Prescriptions      No prescriptions requested or ordered in this encounter       Pap w/ HPV done. ASSCP guidelines reviewed.  Annual exams encouraged. Call if any abnormal vaginal bleeding.  Mammogram order given.  SBE encouraged.             [1] No Known Allergies

## 2025-01-28 LAB — HPV E6+E7 MRNA CVX QL NAA+PROBE: NEGATIVE

## 2025-02-07 ENCOUNTER — HOSPITAL ENCOUNTER (OUTPATIENT)
Dept: MAMMOGRAPHY | Facility: HOSPITAL | Age: 40
Discharge: HOME | End: 2025-02-07
Attending: OBSTETRICS & GYNECOLOGY
Payer: MEDICAID

## 2025-02-07 DIAGNOSIS — Z12.31 ENCOUNTER FOR SCREENING MAMMOGRAM FOR MALIGNANT NEOPLASM OF BREAST: ICD-10-CM

## 2025-02-07 PROCEDURE — 77063 BREAST TOMOSYNTHESIS BI: CPT | Performed by: OBSTETRICS & GYNECOLOGY

## 2025-02-07 PROCEDURE — 77067 SCR MAMMO BI INCL CAD: CPT | Performed by: OBSTETRICS & GYNECOLOGY

## 2025-02-21 ENCOUNTER — PATIENT MESSAGE (OUTPATIENT)
Dept: ENDOCRINOLOGY CLINIC | Facility: CLINIC | Age: 40
End: 2025-02-21

## 2025-02-24 RX ORDER — FLUCONAZOLE 150 MG/1
150 TABLET ORAL ONCE
Qty: 1 TABLET | Refills: 0 | Status: SHIPPED | OUTPATIENT
Start: 2025-02-24 | End: 2025-02-24

## 2025-03-02 ENCOUNTER — APPOINTMENT (OUTPATIENT)
Dept: GENERAL RADIOLOGY | Facility: HOSPITAL | Age: 40
End: 2025-03-02
Attending: NURSE PRACTITIONER
Payer: MEDICAID

## 2025-03-02 ENCOUNTER — HOSPITAL ENCOUNTER (EMERGENCY)
Facility: HOSPITAL | Age: 40
Discharge: HOME | End: 2025-03-02
Payer: MEDICAID

## 2025-03-02 VITALS
BODY MASS INDEX: 26.93 KG/M2 | HEIGHT: 63 IN | WEIGHT: 152 LBS | RESPIRATION RATE: 20 BRPM | HEART RATE: 67 BPM | TEMPERATURE: 99 F | DIASTOLIC BLOOD PRESSURE: 68 MMHG | OXYGEN SATURATION: 99 % | SYSTOLIC BLOOD PRESSURE: 119 MMHG

## 2025-03-02 DIAGNOSIS — J10.1 INFLUENZA B: Primary | ICD-10-CM

## 2025-03-02 LAB
FLUAV + FLUBV RNA SPEC NAA+PROBE: NEGATIVE
FLUAV + FLUBV RNA SPEC NAA+PROBE: POSITIVE
RSV RNA SPEC NAA+PROBE: NEGATIVE
SARS-COV-2 RNA RESP QL NAA+PROBE: NOT DETECTED

## 2025-03-02 PROCEDURE — 71045 X-RAY EXAM CHEST 1 VIEW: CPT | Performed by: NURSE PRACTITIONER

## 2025-03-02 PROCEDURE — 99284 EMERGENCY DEPT VISIT MOD MDM: CPT

## 2025-03-02 PROCEDURE — 0241U SARS-COV-2/FLU A AND B/RSV BY PCR (GENEXPERT): CPT

## 2025-03-02 RX ORDER — ALBUTEROL SULFATE 90 UG/1
2 INHALANT RESPIRATORY (INHALATION) EVERY 4 HOURS PRN
Qty: 1 EACH | Refills: 0 | Status: SHIPPED | OUTPATIENT
Start: 2025-03-02 | End: 2025-04-01

## 2025-03-02 RX ORDER — PREDNISONE 20 MG/1
40 TABLET ORAL DAILY
Qty: 10 TABLET | Refills: 0 | Status: SHIPPED | OUTPATIENT
Start: 2025-03-02 | End: 2025-03-02

## 2025-03-02 RX ORDER — PREDNISONE 20 MG/1
40 TABLET ORAL DAILY
Qty: 10 TABLET | Refills: 0 | Status: SHIPPED | OUTPATIENT
Start: 2025-03-02 | End: 2025-03-07

## 2025-03-02 RX ORDER — ALBUTEROL SULFATE 90 UG/1
2 INHALANT RESPIRATORY (INHALATION) EVERY 4 HOURS PRN
Qty: 1 EACH | Refills: 0 | Status: SHIPPED | OUTPATIENT
Start: 2025-03-02 | End: 2025-03-02

## 2025-03-02 NOTE — ED PROVIDER NOTES
Patient Seen in: [facility] Emergency Department      History     Chief Complaint   Patient presents with    Cough/URI     Stated Complaint: flu/back pain    Subjective:   38yo/f w hx of DM, anxiety reports to the ED w c/o cough, fever, sore throat x 1 week. Worse w time. No head, neck, back, chest pain. No trouble speaking/swallowing. No dizziness. No recent abx use. No diarrhea.             Objective:     Past Medical History:    Anxiety    Decorative tattoo    Diabetes (HCC)    Human papilloma virus infection    No diabetic retinopathy in both eyes              Past Surgical History:   Procedure Laterality Date    Colposcopy,bx cervix/endocerv curr                  Social History     Socioeconomic History    Marital status:    Tobacco Use    Smoking status: Former     Types: Cigarettes    Smokeless tobacco: Never    Tobacco comments:     Quit 2011   Vaping Use    Vaping status: Never Used   Substance and Sexual Activity    Alcohol use: Not Currently    Drug use: No    Sexual activity: Yes     Comment: NONE   Other Topics Concern    Caffeine Concern No    Exercise Yes    Seat Belt Yes    Special Diet No    Stress Concern No    Weight Concern No    Reaction to local anesthetic No   Social History Narrative    ** Merged History Encounter **                       Physical Exam     ED Triage Vitals [03/02/25 1357]   /84   Pulse 74   Resp 18   Temp 98.8 °F (37.1 °C)   Temp src Oral   SpO2 98 %   O2 Device None (Room air)       Current Vitals:   Vital Signs  BP: 126/84  Pulse: 74  Resp: 18  Temp: 98.8 °F (37.1 °C)  Temp src: Oral    Oxygen Therapy  SpO2: 98 %  O2 Device: None (Room air)        Physical Exam  Vitals and nursing note reviewed.   Constitutional:       General: She is not in acute distress.     Appearance: She is well-developed.   HENT:      Head: Normocephalic and atraumatic.      Nose: Nose normal.      Mouth/Throat:      Mouth: Mucous membranes are moist.   Eyes:       Conjunctiva/sclera: Conjunctivae normal.      Pupils: Pupils are equal, round, and reactive to light.   Cardiovascular:      Rate and Rhythm: Normal rate and regular rhythm.      Heart sounds: Normal heart sounds.   Pulmonary:      Effort: Pulmonary effort is normal.      Breath sounds: Normal breath sounds.   Abdominal:      General: Bowel sounds are normal.      Palpations: Abdomen is soft.   Musculoskeletal:         General: No tenderness or deformity. Normal range of motion.      Cervical back: Normal range of motion and neck supple.   Skin:     General: Skin is warm and dry.      Capillary Refill: Capillary refill takes less than 2 seconds.      Findings: No rash.      Comments: Normal color   Neurological:      General: No focal deficit present.      Mental Status: She is alert and oriented to person, place, and time.      GCS: GCS eye subscore is 4. GCS verbal subscore is 5. GCS motor subscore is 6.      Cranial Nerves: No cranial nerve deficit.      Gait: Gait normal.             ED Course     Labs Reviewed   SARS-COV-2/FLU A AND B/RSV BY PCR (GENEXPERT) - Abnormal; Notable for the following components:       Result Value    Influenza B by PCR Positive (*)     All other components within normal limits    Narrative:     This test is intended for the qualitative detection and differentiation of SARS-CoV-2, influenza A, influenza B, and respiratory syncytial virus (RSV) viral RNA in nasopharyngeal or nares swabs from individuals suspected of respiratory viral infection consistent with COVID-19 by their healthcare provider. Signs and symptoms of respiratory viral infection due to SARS-CoV-2, influenza, and RSV can be similar.    Test performed using the Xpert Xpress SARS-CoV-2/FLU/RSV (real time RT-PCR)  assay on the GeneXpert instrument, [facility], [place] [address].   This test is being used under the Food and Drug Administration's Emergency Use Authorization.    The authorized Fact Sheet for Healthcare  Providers for this assay is available upon request from the laboratory.            TECHNIQUE:   Single view.       FINDINGS:  CARDIAC/VASC: Heart size and pulmonary vascularity normal.  MEDIAST/THEODORE:   No visible mass or adenopathy.  LUNGS/PLEURA: No consolidation or pleural effusion.  BONES: No fracture or visible bony lesion.  OTHER: Negative.                Impression  CONCLUSION: Normal examination.             Dictated by (CST): Cesar Maza MD on 3/02/2025 at 4:11 PM      Finalized by (CST): Cesar Maza MD on 3/02/2025 at 4:11 PM             [facility]              Medical Decision Making  39yof w hx and exam as stated; fever/cough/back pain    Lungs ctab  Normotensive  Cxr wnl  No fever  No vomiting  No wheezing    Plan  Prednisone  Albuterol      Amount and/or Complexity of Data Reviewed  Labs:  Decision-making details documented in ED Course.  Radiology:  Decision-making details documented in ED Course.    Risk  OTC drugs.  Prescription drug management.        Disposition and Plan     Clinical Impression:  1. Influenza B         Disposition:  Discharge  3/2/2025  4:14 pm    Follow-up:  Mikhail Zamorano  [address] [address]  [phone]    Follow up in 2 day(s)            Medications Prescribed:  Current Discharge Medication List        START taking these medications    Details   albuterol 108 (90 Base) MCG/ACT Inhalation Aero Soln Inhale 2 puffs into the lungs every 4 (four) hours as needed.  Qty: 1 each, Refills: 0      predniSONE 20 MG Oral Tab Take 2 tablets (40 mg total) by mouth daily for 5 days.  Qty: 10 tablet, Refills: 0                 Supplementary Documentation:

## 2025-03-04 ENCOUNTER — OFFICE VISIT (OUTPATIENT)
Age: 40
End: 2025-03-04
Payer: MEDICAID

## 2025-03-04 VITALS
DIASTOLIC BLOOD PRESSURE: 68 MMHG | SYSTOLIC BLOOD PRESSURE: 108 MMHG | BODY MASS INDEX: 28.17 KG/M2 | HEART RATE: 66 BPM | HEIGHT: 63 IN | OXYGEN SATURATION: 97 % | WEIGHT: 159 LBS

## 2025-03-04 DIAGNOSIS — R06.2 WHEEZING: ICD-10-CM

## 2025-03-04 DIAGNOSIS — E11.9 TYPE 2 DIABETES MELLITUS WITHOUT COMPLICATION, WITHOUT LONG-TERM CURRENT USE OF INSULIN (HCC): ICD-10-CM

## 2025-03-04 DIAGNOSIS — J10.1 INFLUENZA B: Primary | ICD-10-CM

## 2025-03-04 PROCEDURE — 99214 OFFICE O/P EST MOD 30 MIN: CPT | Performed by: INTERNAL MEDICINE

## 2025-03-04 NOTE — PROGRESS NOTES
Collette Venegas female 39 year old       Chief complaint:  was in ER  Sunday  for  flu  sxs  diagnosed  with inf  B      Syptoms  started  last  Monday had typical flu symptoms headache muscle aches cough fever.    To the ER on Sunday.  Treated  with   steroids and  inhaler-reviewed report chest x-ray negative.    Feels better currently denies any shortness of breath or wheezing.      We discussed her diabetes-sees endocrinology.  In January her hemoglobin A1c was 7.4.  Currently is on fark [employer] Trulicity and glimepiride.      Steroids will increase her glucose levels.      She is complaining of some irregular menses denies being pregnant.  Wonder if she could be going through the change.                                 Patient Active Problem List   Diagnosis    Type 2 diabetes mellitus with hyperglycemia, without long-term current use of insulin (HCC)    Immunization not carried out because of patient refusal    Mixed hyperlipidemia    Vitreous floaters of both eyes    Cervical dysplasia, mild        Current Outpatient Medications   Medication Sig Dispense Refill    albuterol 108 (90 Base) MCG/ACT Inhalation Aero Soln Inhale 2 puffs into the lungs every 4 (four) hours as needed. 1 each 0    predniSONE 20 MG Oral Tab Take 2 tablets (40 mg total) by mouth daily for 5 days. 10 tablet 0    Dulaglutide (TRULICITY) 3 MG/0.5ML Subcutaneous Solution Auto-injector Inject 3 mg into the skin once a week. 6 mL 1    GLIMEPIRIDE 4 MG Oral Tab TAKE 1 TABLET(4 MG) BY MOUTH EVERY MORNING BEFORE BREAKFAST 90 tablet 1    doxycycline 100 MG Oral Cap Take 1 capsule (100 mg total) by mouth 2 (two) times daily. 28 capsule 2    dapagliflozin (FARXIGA) 10 MG Oral Tab Take 1 tablet (10 mg total) by mouth daily. 90 tablet 1        Vitals:    03/04/25 1405   BP: 108/68   Pulse: 66   VITALSBody mass index is 28.17 kg/m².      Is not toxic looking.  Lung exam revealed no wheezing moving air well.      Collette was seen today for er  f/u.    Diagnoses and all orders for this visit:    Influenza B    Wheezing    Type 2 diabetes mellitus without complication, without long-term current use of insulin (HCC)       She status post influenza B with what sounds like bronchitis and wheezing she is improved with inhaler and steroids.  Will have finished up with the steroids.  Will increase her glucose levels.  I wrote her a letter for work recommend to go back on Thursday.    Chest x-ray was normal.    Her diabetes control is fair goal is less than 7.  She has a follow-up appointment with endocrinology in May.  Needs diabetic foot exam and microalbumin testing.    She has some irregular menses denies being pregnant.  Discussed if her symptoms persist we can do further workup.                                  This note was prepared using Dragon Medical voice recognition dictation software and as a result, errors may occur. When identified, these errors have been corrected. While every attempt is made to correct errors during dictation, discrepancies may still exist

## 2025-04-14 RX ORDER — DAPAGLIFLOZIN 10 MG/1
10 TABLET, FILM COATED ORAL DAILY
Qty: 90 TABLET | Refills: 1 | Status: SHIPPED | OUTPATIENT
Start: 2025-04-14

## 2025-04-14 NOTE — TELEPHONE ENCOUNTER
Endocrine Refill protocol for oral antihypertensive medications    Protocol Criteria:  PASSED  Reason: N/A     If all below requirements are met, send a 90-day supply with 1 refill per provider protocol.    Verify appointment with Endocrinology completed in the last 6 months or scheduled in the next 3 months.  Verify BMP or CMP completed in the last 12 months   Verify last GFR result is greater than or equal to 50     Last completed office visit:1/24/2025 Rebecca Swan MD   Next scheduled Follow up:   Future Appointments   Date Time Provider Department Center   5/19/2025  3:45 PM Rebecca Swan MD [facility]      Last BMP or CMP completion date:  Lab Results   Component Value Date    GFRAA 133 06/13/2022    GFRNAA 116 06/13/2022    EGFRCR 115 03/11/2024

## 2025-05-01 ENCOUNTER — LAB ENCOUNTER (OUTPATIENT)
Dept: LAB | Facility: HOSPITAL | Age: 40
End: 2025-05-01
Attending: NURSE PRACTITIONER
Payer: MEDICAID

## 2025-05-01 ENCOUNTER — OFFICE VISIT (OUTPATIENT)
Dept: OBGYN CLINIC | Facility: CLINIC | Age: 40
End: 2025-05-01

## 2025-05-01 VITALS
HEART RATE: 81 BPM | BODY MASS INDEX: 28 KG/M2 | SYSTOLIC BLOOD PRESSURE: 118 MMHG | WEIGHT: 157.63 LBS | DIASTOLIC BLOOD PRESSURE: 80 MMHG

## 2025-05-01 DIAGNOSIS — N89.8 VAGINAL ODOR: ICD-10-CM

## 2025-05-01 DIAGNOSIS — N92.1 MENORRHAGIA WITH IRREGULAR CYCLE: ICD-10-CM

## 2025-05-01 DIAGNOSIS — N94.10 DYSPAREUNIA IN FEMALE: ICD-10-CM

## 2025-05-01 DIAGNOSIS — N92.1 MENORRHAGIA WITH IRREGULAR CYCLE: Primary | ICD-10-CM

## 2025-05-01 LAB
B-HCG SERPL-ACNC: <2.6 MIU/ML (ref ?–4.2)
DEPRECATED RDW RBC AUTO: 42.2 FL (ref 35.1–46.3)
ERYTHROCYTE [DISTWIDTH] IN BLOOD BY AUTOMATED COUNT: 16.5 % (ref 11–15)
HCT VFR BLD AUTO: 33.4 % (ref 35–48)
HGB BLD-MCNC: 10.2 G/DL (ref 12–16)
MCH RBC QN AUTO: 21.7 PG (ref 26–34)
MCHC RBC AUTO-ENTMCNC: 30.5 G/DL (ref 31–37)
MCV RBC AUTO: 71.1 FL (ref 80–100)
PLATELET # BLD AUTO: 445 10(3)UL (ref 150–450)
RBC # BLD AUTO: 4.7 X10(6)UL (ref 3.8–5.3)
TSI SER-ACNC: 1.34 UIU/ML (ref 0.55–4.78)
WBC # BLD AUTO: 10.9 X10(3) UL (ref 4–11)

## 2025-05-01 PROCEDURE — 99214 OFFICE O/P EST MOD 30 MIN: CPT | Performed by: NURSE PRACTITIONER

## 2025-05-01 PROCEDURE — 85027 COMPLETE CBC AUTOMATED: CPT

## 2025-05-01 PROCEDURE — 84443 ASSAY THYROID STIM HORMONE: CPT | Performed by: NURSE PRACTITIONER

## 2025-05-01 PROCEDURE — 84702 CHORIONIC GONADOTROPIN TEST: CPT

## 2025-05-01 PROCEDURE — 36415 COLL VENOUS BLD VENIPUNCTURE: CPT | Performed by: NURSE PRACTITIONER

## 2025-05-01 NOTE — PROGRESS NOTES
[facility]   Obstetrics and Gynecology    Collette Venegas is a 40 year old female  Patient's last menstrual period was 2025 (approximate).   Chief Complaint   Patient presents with    Gyn Problem     VAGINAL DISCHARGE, AND DIFFERENT ODOR   .   History of Present Illness  Collette Venegas is a 40 year old female who presents with concerns about abnormal vaginal odor and menstrual irregularities.    She experiences a strong, yeasty odor described as 'smelling like vinegar' for the past few months, noticeable through her clothes. She uses panty liners to manage the odor and minor discharge. There is no abnormal discharge, itching, or fishy smell. She has had no new sexual partners, recent antibiotic use, or changes in soap or detergents.    Her menstrual cycle was previously regular every 28 days. In December, she had an regular period, with no period in January or February. In March, her period was unusually heavy, requiring pull-ups changed two to three times a day. In April, her period was initially light for a week, then became heavy, lasting two weeks. Her periods have typically lasted six days since age [age].    She experiences discomfort and pain during intercourse with her , worsening over the past few months.    Her current medications include Trulicity, Farxiga, and glimepiride. Last hgbA1c was 7.4      Pap:2025 NILM, negative HPV  Contraception: none    OBSTETRICS HISTORY:  OB History    Para Term  AB Living   7 3 3 0 3 3   SAB IAB Ectopic Multiple Live Births   2 0 0 0 3       GYNE HISTORY:  Menarche: 11 (2025  4:01 PM)  Period Cycle (Days): IRREGULAR LATELY (2025  4:01 PM)  Period Duration (Days): 6 days (2025  4:01 PM)  Period Flow: HEAVY TO LIGHT (2025  4:01 PM)  Use of Birth Control (if yes, specify type): None (2025  4:01 PM)  Pap Date: 25 (2025  4:01 PM)  Pap Result Notes: NEG HPV NEG, MAMMO 25 KEVIN PAM ASSES. (2025   4:01 PM)  Follow Up Recommendation: Mammo 12/6/22 Diag C1- Neg (6/19/2024  4:28 PM)      History   Sexual Activity    Sexual activity: Yes     Comment: NONE       MEDICAL HISTORY:  Past Medical History:    Anxiety    Decorative tattoo    Diabetes (HCC)    Human papilloma virus infection    No diabetic retinopathy in both eyes       SOCIAL HISTORY:  Social History     Socioeconomic History    Marital status:      Spouse name: Not on file    Number of children: Not on file    Years of education: Not on file    Highest education level: Not on file   Occupational History    Not on file   Tobacco Use    Smoking status: Former     Types: Cigarettes    Smokeless tobacco: Never    Tobacco comments:     Quit 2011   Vaping Use    Vaping status: Never Used   Substance and Sexual Activity    Alcohol use: Not Currently    Drug use: No    Sexual activity: Yes     Comment: NONE   Other Topics Concern    Caffeine Concern No    Exercise Yes    Seat Belt Yes    Special Diet No    Stress Concern No    Weight Concern No    Grew up on a farm Not Asked    History of tanning Not Asked    Outdoor occupation Not Asked    Breast feeding Not Asked    Reaction to local anesthetic No    Pt has a pacemaker Not Asked    Pt has a defibrillator Not Asked   Social History Narrative    ** Merged History Encounter **          Social Drivers of Health     Food Insecurity: Not on file   Transportation Needs: Not on file   Stress: Not on file   Housing Stability: Not on file       MEDICATIONS:    Current Outpatient Medications:     FARXIGA 10 MG Oral Tab, TAKE 1 TABLET(10 MG) BY MOUTH DAILY, Disp: 90 tablet, Rfl: 1    Dulaglutide (TRULICITY) 3 MG/0.5ML Subcutaneous Solution Auto-injector, Inject 3 mg into the skin once a week., Disp: 6 mL, Rfl: 1    GLIMEPIRIDE 4 MG Oral Tab, TAKE 1 TABLET(4 MG) BY MOUTH EVERY MORNING BEFORE BREAKFAST, Disp: 90 tablet, Rfl: 1    doxycycline 100 MG Oral Cap, Take 1 capsule (100 mg total) by mouth 2 (two) times  daily., Disp: 28 capsule, Rfl: 2    ALLERGIES:  No Known Allergies      Review of Systems:  Constitutional:  Denies fatigue, night sweats, hot flashes  Cardiovascular:  denies chest pain or palpitations  Respiratory:  denies shortness of breath  Gastrointestinal:  denies heartburn, abdominal pain, diarrhea or constipation  Genitourinary:  denies dysuria, incontinence, abnormal vaginal discharge, vaginal itching   Musculoskeletal:  denies back pain.  Skin/Breast:  Denies any breast pain, lumps, or discharge.   Neurological:  denies headaches, extremity weakness or numbness.  Psychiatric: denies depression or anxiety.  Endocrine:   denies excessive thirst or urination.  Heme/Lymph:  denies history of anemia, easy bruising or bleeding.      PHYSICAL EXAM:     Vitals:    05/01/25 1603   BP: 118/80   Pulse: 81   Weight: 157 lb 9.6 oz (71.5 kg)     Body mass index is 27.92 kg/m².     Patient offered chaperone, patient declined    Constitutional: well developed, well nourished    Psychiatric:  Oriented to time, place, person and situation. Appropriate mood and affect    Pelvic Exam:  External Genitalia: normal appearance, hair distribution, and no lesions  Urethral Meatus:  normal in size, location, without lesions and prolapse  Bladder:  No fullness, masses or tenderness  Vagina:  Normal appearance without lesions, no abnormal discharge  Cervix:  Normal without tenderness on motion  Uterus: normal in size, contour, position, mobility, without tenderness  Adnexa: normal without masses or tenderness  Perineum: normal  Anus: no hemorroids   Lymph node: no inguinal lymph nodes      Assessment & Plan:    ICD-10-CM    1. Menorrhagia with irregular cycle  N92.1 HCG, Beta Subunit (Quant Pregnancy Test)     TSH W Reflex To Free T4     CBC, Platelet; No Differential     US PELVIS W EV (CPT=76856/60802)      2. Dyspareunia in female  N94.10 HCG, Beta Subunit (Quant Pregnancy Test)     TSH W Reflex To Free T4     CBC, Platelet; No  Differential     US PELVIS W EV (CPT=76856/39616)      3. Vaginal odor  N89.8 Vaginitis Vaginosis PCR Panel          Assessment & Plan  Abnormal uterine bleeding  -reviewed reasons for abnormal bleeding  - Order pelvic ultrasound to evaluate uterus and ovaries.  - Order CBC and thyroid function tests.  -may need endometrial biopsy - will follow up after US    Dyspareunia    - Perform culture to rule out infections.  - Evaluate pelvic ultrasound results for structural causes.    Vaginal odor  - culture done  Will call if abnormal        JOSELIN Chaudhari        This note was prepared using Dragon Medical voice recognition dictation software. As a result errors may occur. When identified these errors have been corrected. While every attempt is made to correct errors during dictation discrepancies may still exist.

## 2025-05-01 NOTE — PROGRESS NOTES
The following individual(s) verbally consented to be recorded using ambient AI listening technology and understand that they can each withdraw their consent to this listening technology at any point by asking the clinician to turn off or pause the recording:    Patient name: Collette Venegas

## 2025-05-02 ENCOUNTER — PATIENT MESSAGE (OUTPATIENT)
Dept: OBGYN CLINIC | Facility: CLINIC | Age: 40
End: 2025-05-02

## 2025-05-02 LAB
BV BACTERIA DNA VAG QL NAA+PROBE: POSITIVE
C GLABRATA DNA VAG QL NAA+PROBE: POSITIVE
C KRUSEI DNA VAG QL NAA+PROBE: NEGATIVE
CANDIDA DNA VAG QL NAA+PROBE: NEGATIVE
T VAGINALIS DNA VAG QL NAA+PROBE: NEGATIVE

## 2025-05-02 RX ORDER — METRONIDAZOLE 500 MG/1
500 TABLET ORAL 2 TIMES DAILY
Qty: 14 TABLET | Refills: 0 | Status: SHIPPED | OUTPATIENT
Start: 2025-05-02 | End: 2025-05-09

## 2025-05-02 RX ORDER — TERCONAZOLE 8 MG/G
1 CREAM VAGINAL NIGHTLY
Qty: 20 G | Refills: 0 | Status: SHIPPED | OUTPATIENT
Start: 2025-05-02

## 2025-05-02 NOTE — TELEPHONE ENCOUNTER
JOSELIN Chaudhari  5/2/2025 10:01 AM CDT Back to Top      Start iron daily. Follow up with me after pelvic ultrasound for endometrial biopsy due to heavy and abnormal bleeding.  Repeat labs at next visit in 1 month     JOSELIN Chaudhari APRN  5/2/2025 10:59 AM CDT       See other lab results.  +bacterial vaginosis and +candida glabrata - start flagyl 500 mg PO BID x 7 days and terconazole vaginal cream 0.8% cream for 3 days

## 2025-05-02 NOTE — TELEPHONE ENCOUNTER
Collette notified. Discussed results in details.  Recommendations given for flagyl, advised no etoh.   Recommendations given for terazol x 3 nights. Recommendations given for slow fe daily. Patient verbalized understanding.

## 2025-05-19 ENCOUNTER — LAB ENCOUNTER (OUTPATIENT)
Dept: LAB | Facility: HOSPITAL | Age: 40
End: 2025-05-19
Attending: INTERNAL MEDICINE
Payer: MEDICAID

## 2025-05-19 ENCOUNTER — OFFICE VISIT (OUTPATIENT)
Dept: ENDOCRINOLOGY CLINIC | Facility: CLINIC | Age: 40
End: 2025-05-19

## 2025-05-19 VITALS
HEIGHT: 63 IN | DIASTOLIC BLOOD PRESSURE: 65 MMHG | HEART RATE: 81 BPM | WEIGHT: 159 LBS | SYSTOLIC BLOOD PRESSURE: 101 MMHG | BODY MASS INDEX: 28.17 KG/M2

## 2025-05-19 DIAGNOSIS — E11.65 UNCONTROLLED TYPE 2 DIABETES MELLITUS WITH HYPERGLYCEMIA (HCC): Primary | ICD-10-CM

## 2025-05-19 DIAGNOSIS — E11.65 UNCONTROLLED TYPE 2 DIABETES MELLITUS WITH HYPERGLYCEMIA (HCC): ICD-10-CM

## 2025-05-19 LAB
ALBUMIN SERPL-MCNC: 4.4 G/DL (ref 3.2–4.8)
ALBUMIN/GLOB SERPL: 1.7 {RATIO} (ref 1–2)
ALP LIVER SERPL-CCNC: 69 U/L (ref 37–98)
ALT SERPL-CCNC: 17 U/L (ref 10–49)
ANION GAP SERPL CALC-SCNC: 9 MMOL/L (ref 0–18)
AST SERPL-CCNC: 15 U/L (ref ?–34)
BILIRUB SERPL-MCNC: 0.3 MG/DL (ref 0.3–1.2)
BUN BLD-MCNC: 10 MG/DL (ref 9–23)
BUN/CREAT SERPL: 14.9 (ref 10–20)
CALCIUM BLD-MCNC: 9 MG/DL (ref 8.7–10.4)
CHLORIDE SERPL-SCNC: 101 MMOL/L (ref 98–112)
CHOLEST SERPL-MCNC: 162 MG/DL (ref ?–200)
CO2 SERPL-SCNC: 28 MMOL/L (ref 21–32)
CREAT BLD-MCNC: 0.67 MG/DL (ref 0.55–1.02)
CREAT UR-SCNC: 90 MG/DL
EGFRCR SERPLBLD CKD-EPI 2021: 113 ML/MIN/1.73M2 (ref 60–?)
FASTING PATIENT LIPID ANSWER: NO
FASTING STATUS PATIENT QL REPORTED: NO
GLOBULIN PLAS-MCNC: 2.6 G/DL (ref 2–3.5)
GLUCOSE BLD-MCNC: 77 MG/DL (ref 70–99)
GLUCOSE BLOOD: 104
HDLC SERPL-MCNC: 40 MG/DL (ref 40–59)
HEMOGLOBIN A1C: 7.2 % (ref 4.3–5.6)
LDLC SERPL CALC-MCNC: 106 MG/DL (ref ?–100)
MICROALBUMIN UR-MCNC: <0.3 MG/DL
NONHDLC SERPL-MCNC: 122 MG/DL (ref ?–130)
OSMOLALITY SERPL CALC.SUM OF ELEC: 284 MOSM/KG (ref 275–295)
POTASSIUM SERPL-SCNC: 3.9 MMOL/L (ref 3.5–5.1)
PROT SERPL-MCNC: 7 G/DL (ref 5.7–8.2)
SODIUM SERPL-SCNC: 138 MMOL/L (ref 136–145)
TEST STRIP LOT #: NORMAL NUMERIC
TRIGL SERPL-MCNC: 84 MG/DL (ref 30–149)
TSI SER-ACNC: 1.28 UIU/ML (ref 0.55–4.78)
VLDLC SERPL CALC-MCNC: 14 MG/DL (ref 0–30)

## 2025-05-19 PROCEDURE — 82947 ASSAY GLUCOSE BLOOD QUANT: CPT | Performed by: INTERNAL MEDICINE

## 2025-05-19 PROCEDURE — 82570 ASSAY OF URINE CREATININE: CPT

## 2025-05-19 PROCEDURE — 99214 OFFICE O/P EST MOD 30 MIN: CPT | Performed by: INTERNAL MEDICINE

## 2025-05-19 PROCEDURE — 80061 LIPID PANEL: CPT

## 2025-05-19 PROCEDURE — 80053 COMPREHEN METABOLIC PANEL: CPT

## 2025-05-19 PROCEDURE — 84443 ASSAY THYROID STIM HORMONE: CPT

## 2025-05-19 PROCEDURE — 36415 COLL VENOUS BLD VENIPUNCTURE: CPT

## 2025-05-19 PROCEDURE — 82043 UR ALBUMIN QUANTITATIVE: CPT

## 2025-05-19 PROCEDURE — 83036 HEMOGLOBIN GLYCOSYLATED A1C: CPT | Performed by: INTERNAL MEDICINE

## 2025-05-19 NOTE — PROGRESS NOTES
Name: Collette Venegas  Date: 5/19/2025    Referring Physician: No ref. provider found    HISTORY OF PRESENT ILLNESS   Collette Venegas is a 40 year old female who presents for diabetes mellitus, diagnosed 7 years ago.      She had gestational diabetes with several pregnancies and progressed after delivery of 3rd baby.      Prior HbA, C or glycohemoglobin were 11.6% 7/2020; 10.3% 10/2020; 10.0% 7/2022; 8.7% 9/2022; 9.5% 1/2023; 8.0% 5/2023; 7.5% 7/2023; 7.5% 8/2023; 8.2% 12/2023; 8.3% 4/2024; 8.6% 8/2024; 7.5% 11/2024; 7.4% 1/2025; 7.2% POC Today     Dietary compliance: Fair -->she has not undergone any recent education; she tries to watch CHO intake; no pop or juice   Exercise: No  Polyuria/polydipsia: No  Blurred vision: No    Episodes of hypoglycemia: No  Blood Glucose:  Checking infrequently   130s     Medications for DM   Glimepiride 4mg PO daily   Farxiga 10mg PO daily  Trulicity 3mg subcutaneous weekly     Insulin stopped since no longer pregnant   Glipizide stopped because she doesn't want to be dependent   Metformin d/c'd due to GI distress     REVIEW OF SYSTEMS  Eyes: Diabetic retinopathy present: No            Most recent visit to eye doctor in last 12 months: Yes    CV: Cardiovascular disease present: No         Hypertension present: No         Hyperlipidemia present: Yes         Peripheral Vascular Disease present: No    : Nephropathy present: No    Neuro: Neuropathy present: No    Skin: Infection or ulceration: No    Osteoporosis: No    Thyroid disease: No      Medications:     Current Outpatient Medications:     FARXIGA 10 MG Oral Tab, TAKE 1 TABLET(10 MG) BY MOUTH DAILY, Disp: 90 tablet, Rfl: 1    Dulaglutide (TRULICITY) 3 MG/0.5ML Subcutaneous Solution Auto-injector, Inject 3 mg into the skin once a week., Disp: 6 mL, Rfl: 1    GLIMEPIRIDE 4 MG Oral Tab, TAKE 1 TABLET(4 MG) BY MOUTH EVERY MORNING BEFORE BREAKFAST, Disp: 90 tablet, Rfl: 1    Terconazole 0.8 % Vaginal Cream, Place 1 applicator  vaginally nightly. At bedtime for 3 nights in a row., Disp: 20 g, Rfl: 0    doxycycline 100 MG Oral Cap, Take 1 capsule (100 mg total) by mouth 2 (two) times daily., Disp: 28 capsule, Rfl: 2     Allergies:   No Known Allergies    Social History:   Social History     Socioeconomic History    Marital status:    Tobacco Use    Smoking status: Former     Types: Cigarettes    Smokeless tobacco: Never    Tobacco comments:     Quit 2011   Vaping Use    Vaping status: Never Used   Substance and Sexual Activity    Alcohol use: Not Currently    Drug use: No    Sexual activity: Yes     Comment: NONE   Other Topics Concern    Caffeine Concern No    Exercise Yes    Seat Belt Yes    Special Diet No    Stress Concern No    Weight Concern No    Reaction to local anesthetic No       Medical History:   Past Medical History:    Anxiety    Decorative tattoo    Diabetes (HCC)    Human papilloma virus infection    No diabetic retinopathy in both eyes       Surgical history:   Past Surgical History:   Procedure Laterality Date    Colposcopy,bx cervix/endocerv curr           PHYSICAL EXAM  /65   Pulse 81   Ht 5' 3\" (1.6 m)   Wt 159 lb (72.1 kg)   LMP 04/07/2025 (Approximate)   BMI 28.17 kg/m²     General Appearance:  alert, well developed, in no acute distress  Eyes:  normal conjunctivae, sclera., normal sclera and normal pupils  Ears/Nose/Mouth/Throat/Neck:  no palpable thyroid nodules or cervical lymphadenopathy  Back: no kyphosis or back tenderness  Musculoskeletal:  normal muscle strength and tone  Skin:  normal moisture and skin texture  Hair & Nails:  normal scalp hair     Hematologic:  no excessive bruising  Neuro:  sensory grossly intact and motor grossly intact  Psychiatric:  oriented to time, self, and place  Nutritional:  no abnormal weight gain or loss  Bilateral barefoot skin diabetic exam is normal, visualized feet and the appearance is normal.  Bilateral monofilament/sensation of both feet is  normal.  Pulsation pedal pulse exam of both lower legs/feet is normal as well.        ASSESSMENT/PLAN:      1. Diabetes Mellitus Type 2, Uncontrolled  -Uncontrolled; HgA1c 7.2% -->improved   -Discussed importance of glycemic control to prevent complications of diabetes  -Discussed complications of diabetes include retinopathy, neuropathy, nephropathy and cardiovascular disease  -Discussed ABCs of DM  -Discussed importance of SBGM -->discussed checking at least 3 times per week   -Discussed importance of low CHO diet, recommend 45gm per meal or 135gm per day  -Continue Farxiga 10mg PO daily   -Continue Truilcity 3mg subcutaneous weekly, verbalized understanding of risks and benefits   -Continue Glimepiride 4mg PO daily    -Discussed OCPs but she declined   -Normotensive  -Normal foot exam performed today   -REfer to Optho   -Normal labs   -Recheck yearly labs -->discussed going to lab     RTC 6 months     5/19/2025  Rebecca Swan MD

## 2025-05-21 ENCOUNTER — HOSPITAL ENCOUNTER (OUTPATIENT)
Dept: ULTRASOUND IMAGING | Facility: HOSPITAL | Age: 40
Discharge: HOME | End: 2025-05-21
Attending: NURSE PRACTITIONER
Payer: MEDICAID

## 2025-05-21 DIAGNOSIS — N94.10 DYSPAREUNIA IN FEMALE: ICD-10-CM

## 2025-05-21 DIAGNOSIS — N92.1 MENORRHAGIA WITH IRREGULAR CYCLE: ICD-10-CM

## 2025-05-21 PROCEDURE — 76856 US EXAM PELVIC COMPLETE: CPT | Performed by: NURSE PRACTITIONER

## 2025-05-21 PROCEDURE — 76830 TRANSVAGINAL US NON-OB: CPT | Performed by: NURSE PRACTITIONER

## 2025-06-05 ENCOUNTER — PATIENT MESSAGE (OUTPATIENT)
Dept: OBGYN CLINIC | Facility: CLINIC | Age: 40
End: 2025-06-05

## 2025-06-05 NOTE — TELEPHONE ENCOUNTER
Message to Theodora Velasco to please review patient's pelvic ultrasound results dated 5/21, thank you.

## 2025-06-05 NOTE — TELEPHONE ENCOUNTER
Theodora Velasco, APRN to [facility] Ob/Gyne Clinical Staff (Selected Message)        6/5/25 12:45 PM  Normal ovaries, uterus with features of adenomyosis, but due to anemia and heavy periods, need follow up in Office for embx and discussion of results.     Theodora Velasco APN

## 2025-06-13 ENCOUNTER — OFFICE VISIT (OUTPATIENT)
Dept: OBGYN CLINIC | Facility: CLINIC | Age: 40
End: 2025-06-13

## 2025-06-13 VITALS
WEIGHT: 156.19 LBS | DIASTOLIC BLOOD PRESSURE: 68 MMHG | SYSTOLIC BLOOD PRESSURE: 100 MMHG | BODY MASS INDEX: 28 KG/M2 | HEART RATE: 75 BPM

## 2025-06-13 DIAGNOSIS — N93.9 ABNORMAL UTERINE BLEEDING: Primary | ICD-10-CM

## 2025-06-13 PROCEDURE — 99214 OFFICE O/P EST MOD 30 MIN: CPT | Performed by: NURSE PRACTITIONER

## 2025-06-13 NOTE — PROGRESS NOTES
[facility]   Obstetrics and Gynecology    Collette Venegas is a 40 year old female  Patient's last menstrual period was 2025 (approximate).   Chief Complaint   Patient presents with    Follow - Up     FOLLOW UP ultrasound RESULTS   . Last seen 2025 for abnormal bleeding in march, her period was unusually heavy, requiring pull-ups changed two to three times a day. In April, her period was initially light for a week, then became heavy, lasting two weeks.     Her current medications include Trulicity, Farxiga, and glimepiride. Last hgbA1c was 7.4  History of Present Illness  She had a pelvic ultrasound that was reviewed below    Since March, she experiences unusually heavy menstrual bleeding, with a particularly heavy period in April lasting two weeks. Recent cycles begin with light spotting for four to five days, followed by a regular flow lasting five to six days, totaling about ten days of bleeding. The April episode followed a two-month absence of menstruation.    An ultrasound shows a normal uterine size with no masses or fibroids, but a cystic myometrial focus is present.     She has experienced two miscarriages, one in August and another in March, and is concerned about the impact of her menstrual issues on fertility. She is not using birth control and is sexually active without condoms. Patient unsure if desires future pregnancy    She experiences vaginal dryness, which she attributes to aging. Lubricants have been irritating       Pap:2025 NILM, negative human papillomavirus; due for pap in 2026  Hx of leep in 2024 LAUREN 2    2024 LAUREN 2 colposcopy  2024  Contraception: none    Narrative   PROCEDURE: US PELVIS W EV (CPT=76856/33371)     COMPARISON: [facility],  PELVIS TRANSABDOMINAL AND TRANSVAGINAL (CPT=76856/17782), 4/10/2019, 1:40 PM.     INDICATIONS: Dyspareunia in female, Menorrhagia with irregular cycle     TECHNIQUE: Pelvic ultrasound using  transabdominal and transvaginal technique.  A transvaginal scan was performed for attempted better delineation of the ovaries and endometrium.     FINDINGS:  UTERUS:   Size is 9 x 4.7 x 6 cm.       ENDOMETRIUM: The endometrium measures 1.3 cm and is unremarkable.  MYOMETRIUM: Small myometrial cystic foci noted.  An anterior myometrial cystic focus measures up to 0.6 cm.     OVARIES AND ADNEXA:     RIGHT:   The right ovary measures 3.6 x 2.8 x 2.6 cm.  Normal appearance with no masses.    LEFT:   The left ovary measures 2.3 x 1.2 x 1.6 cm.  Normal appearance with no masses.       CUL-DE-SAC:   Trace free fluid.  OTHER: Negative.               Impression   CONCLUSION:  1. Small cystic myometrial foci which can be seen with adenomyosis.  2. Trace free fluid pelvis.             Dictated by (CST): Charly Mcneil MD on 2025 at 8:53 AM      Finalized by (CST): Charly Mcneil MD on 2025 at 8:54 AM           OBSTETRICS HISTORY:  OB History    Para Term  AB Living   7 3 3 0 3 3   SAB IAB Ectopic Multiple Live Births   2 0 0 0 3       GYNE HISTORY:  Menarche: 11 (2025  1:15 PM)  Period Cycle (Days): MONTHLY (2025  1:15 PM)  Period Duration (Days): 6-7 (2025  1:15 PM)  Period Flow: LIGHT 4-5 DAYS THEN REGULAR (2025  1:15 PM)  Use of Birth Control (if yes, specify type): None (2025  1:15 PM)  Pap Date: 25 (2025  1:15 PM)  Pap Result Notes: NEG HPV NEG, MAMMO 25 KEVIN PAM ASSES. (2025  1:15 PM)  Follow Up Recommendation: Mammo 22 Diag C1- Neg (2024  4:28 PM)      History   Sexual Activity    Sexual activity: Yes     Comment: NONE       MEDICAL HISTORY:  Past Medical History:    Anxiety    Decorative tattoo    Diabetes (HCC)    Human papilloma virus infection    No diabetic retinopathy in both eyes       SOCIAL HISTORY:  Social History     Socioeconomic History    Marital status:      Spouse name: Not on file    Number of children: Not on  file    Years of education: Not on file    Highest education level: Not on file   Occupational History    Not on file   Tobacco Use    Smoking status: Former     Types: Cigarettes    Smokeless tobacco: Never    Tobacco comments:     Quit 2011   Vaping Use    Vaping status: Never Used   Substance and Sexual Activity    Alcohol use: Not Currently    Drug use: No    Sexual activity: Yes     Comment: NONE   Other Topics Concern    Caffeine Concern No    Exercise Yes    Seat Belt Yes    Special Diet No    Stress Concern No    Weight Concern No    Grew up on a farm Not Asked    History of tanning Not Asked    Outdoor occupation Not Asked    Breast feeding Not Asked    Reaction to local anesthetic No    Pt has a pacemaker Not Asked    Pt has a defibrillator Not Asked   Social History Narrative    ** Merged History Encounter **          Social Drivers of Health     Food Insecurity: Not on file   Transportation Needs: Not on file   Stress: Not on file   Housing Stability: Not on file       MEDICATIONS:    Current Outpatient Medications:     Terconazole 0.8 % Vaginal Cream, Place 1 applicator vaginally nightly. At bedtime for 3 nights in a row., Disp: 20 g, Rfl: 0    FARXIGA 10 MG Oral Tab, TAKE 1 TABLET(10 MG) BY MOUTH DAILY, Disp: 90 tablet, Rfl: 1    Dulaglutide (TRULICITY) 3 MG/0.5ML Subcutaneous Solution Auto-injector, Inject 3 mg into the skin once a week., Disp: 6 mL, Rfl: 1    GLIMEPIRIDE 4 MG Oral Tab, TAKE 1 TABLET(4 MG) BY MOUTH EVERY MORNING BEFORE BREAKFAST, Disp: 90 tablet, Rfl: 1    doxycycline 100 MG Oral Cap, Take 1 capsule (100 mg total) by mouth 2 (two) times daily., Disp: 28 capsule, Rfl: 2    ALLERGIES:  No Known Allergies      Review of Systems:  Constitutional:  Denies fatigue, night sweats, hot flashes  Cardiovascular:  denies chest pain or palpitations  Respiratory:  denies shortness of breath  Gastrointestinal:  denies heartburn, abdominal pain, diarrhea or constipation  Genitourinary:  denies  dysuria, incontinence, abnormal vaginal discharge, vaginal itching see HPI  Musculoskeletal:  denies back pain.  Skin/Breast:  Denies any breast pain, lumps, or discharge.   Neurological:  denies headaches, extremity weakness or numbness.  Psychiatric: denies depression or anxiety.  Endocrine:   denies excessive thirst or urination.  Heme/Lymph:  denies history of anemia, easy bruising or bleeding.      PHYSICAL EXAM:     Vitals:    06/13/25 1324   BP: 100/68   Pulse: 75   Weight: 156 lb 3.2 oz (70.9 kg)     Body mass index is 27.67 kg/m².         Constitutional: well developed, well nourished    Psychiatric:  Oriented to time, place, person and situation. Appropriate mood and affect    Recent Results (from the past 8760 hours)   CBC, Platelet; No Differential    Collection Time: 05/01/25  4:56 PM   Result Value Ref Range    WBC 10.9 4.0 - 11.0 x10(3) uL    RBC 4.70 3.80 - 5.30 x10(6)uL    HGB 10.2 (L) 12.0 - 16.0 g/dL    HCT 33.4 (L) 35.0 - 48.0 %    MCV 71.1 (L) 80.0 - 100.0 fL    MCH 21.7 (L) 26.0 - 34.0 pg    MCHC 30.5 (L) 31.0 - 37.0 g/dL    RDW 16.5 (H) 11.0 - 15.0 %    RDW-SD 42.2 35.1 - 46.3 fL    .0 150.0 - 450.0 10(3)uL     *Note: Due to a large number of results and/or encounters for the requested time period, some results have not been displayed. A complete set of results can be found in Results Review.         Assessment & Plan:    ICD-10-CM    1. Abnormal uterine bleeding  N93.9               Assessment & Plan  Abnormal Uterine Bleeding  Heavy, prolonged menstrual bleeding with potential adenomyosis. Risk factors include age and insulin resistance. Reviewed concerns with abnormal uterine bleeding.  - Schedule endometrial biopsy post-menstrual period; patient declined today but also has had UPI since her last menstrual period on 6/3; call with next period  - Advise ibuprofen pre-biopsy for cramp management.  - Instruct to avoid tampons and intercourse for three days post-biopsy.  - Discuss  treatment options if biopsy normal: medication to reduce flow, Mirena IUD, or progestin pill.      vaginal Dryness    - Advise water-based or silicone lubricants during intercourse.  - Consider vaginal estrogen if dryness persists.        JOSELIN Chaudhari        This note was prepared using Dragon Medical voice recognition dictation software. As a result errors may occur. When identified these errors have been corrected. While every attempt is made to correct errors during dictation discrepancies may still exist.

## 2025-06-21 ENCOUNTER — PATIENT MESSAGE (OUTPATIENT)
Dept: OBGYN CLINIC | Facility: CLINIC | Age: 40
End: 2025-06-21

## 2025-06-23 NOTE — TELEPHONE ENCOUNTER
Message to EMB to please see pts my chart messages.     Pt reports vaginal dryness and discomfort, itching, and white/thick clumpy discharge. Pt also stated that on Saturday her vaginal odor smelled like \"vomit\" but reports the odor is now gone. Pt asking for diflucan.   OK for diflucan or did you want to see pt for exam?

## 2025-06-23 NOTE — TELEPHONE ENCOUNTER
Her last culture showed candida glabrata and that doesn't respond to fluconazole which is why we did the cream. Recommend culture in office

## 2025-06-26 ENCOUNTER — OFFICE VISIT (OUTPATIENT)
Dept: OBGYN CLINIC | Facility: CLINIC | Age: 40
End: 2025-06-26

## 2025-06-26 VITALS
DIASTOLIC BLOOD PRESSURE: 85 MMHG | WEIGHT: 155 LBS | BODY MASS INDEX: 27 KG/M2 | SYSTOLIC BLOOD PRESSURE: 126 MMHG | HEART RATE: 80 BPM

## 2025-06-26 DIAGNOSIS — N89.8 VAGINAL IRRITATION: Primary | ICD-10-CM

## 2025-06-26 PROCEDURE — 99212 OFFICE O/P EST SF 10 MIN: CPT | Performed by: NURSE PRACTITIONER

## 2025-06-26 NOTE — PROGRESS NOTES
[facility]   Obstetrics and Gynecology    Collette Venegas is a 40 year old female  Patient's last menstrual period was 2025 (approximate).   Chief Complaint   Patient presents with    Gyn Problem     YEAST INFECTION    . Treated for bacterial vaginosis and candida in 2025  History of Present Illness  She experienced a foul-smelling vaginal discharge, described as smelling like 'vomit', last Saturday, which resolved by the next day. She completed a three-day course of Monistat cream two nights ago. Initial application caused significant burning, but subsequent applications were tolerated. She continues to have some irritation and 'thinning of the skin' but no current itching or unusual discharge.        Pap:2025 NILM, negative HPV  Contraception: none    OBSTETRICS HISTORY:  OB History    Para Term  AB Living   7 3 3 0 3 3   SAB IAB Ectopic Multiple Live Births   2 0 0 0 3       GYNE HISTORY:  Menarche: 11 (2025  1:15 PM)  Period Cycle (Days): MONTHLY (2025  1:15 PM)  Period Duration (Days): 6-7 (2025  1:15 PM)  Period Flow: LIGHT 4-5 DAYS THEN REGULAR (2025  1:15 PM)  Use of Birth Control (if yes, specify type): None (2025  1:15 PM)  Pap Date: 25 (2025  1:15 PM)  Pap Result Notes: NEG HPV NEG, MAMMO 25 KEVIN PAM ASSES. (2025  1:15 PM)      History   Sexual Activity    Sexual activity: Yes     Comment: NONE       MEDICAL HISTORY:  Past Medical History:    Anxiety    Decorative tattoo    Diabetes (HCC)    Human papilloma virus infection    No diabetic retinopathy in both eyes       SOCIAL HISTORY:  Social History     Socioeconomic History    Marital status:      Spouse name: Not on file    Number of children: Not on file    Years of education: Not on file    Highest education level: Not on file   Occupational History    Not on file   Tobacco Use    Smoking status: Former     Types: Cigarettes    Smokeless tobacco: Never     Tobacco comments:     Quit 2011   Vaping Use    Vaping status: Never Used   Substance and Sexual Activity    Alcohol use: Not Currently    Drug use: No    Sexual activity: Yes     Comment: NONE   Other Topics Concern    Caffeine Concern No    Exercise Yes    Seat Belt Yes    Special Diet No    Stress Concern No    Weight Concern No    Grew up on a farm Not Asked    History of tanning Not Asked    Outdoor occupation Not Asked    Breast feeding Not Asked    Reaction to local anesthetic No    Pt has a pacemaker Not Asked    Pt has a defibrillator Not Asked   Social History Narrative    ** Merged History Encounter **          Social Drivers of Health     Food Insecurity: Not on file   Transportation Needs: Not on file   Stress: Not on file   Housing Stability: Not on file       MEDICATIONS:    Current Outpatient Medications:     Terconazole 0.8 % Vaginal Cream, Place 1 applicator vaginally nightly. At bedtime for 3 nights in a row., Disp: 20 g, Rfl: 0    FARXIGA 10 MG Oral Tab, TAKE 1 TABLET(10 MG) BY MOUTH DAILY, Disp: 90 tablet, Rfl: 1    Dulaglutide (TRULICITY) 3 MG/0.5ML Subcutaneous Solution Auto-injector, Inject 3 mg into the skin once a week., Disp: 6 mL, Rfl: 1    GLIMEPIRIDE 4 MG Oral Tab, TAKE 1 TABLET(4 MG) BY MOUTH EVERY MORNING BEFORE BREAKFAST, Disp: 90 tablet, Rfl: 1    doxycycline 100 MG Oral Cap, Take 1 capsule (100 mg total) by mouth 2 (two) times daily., Disp: 28 capsule, Rfl: 2    ALLERGIES:  No Known Allergies      Review of Systems:  Constitutional:  Denies fatigue, night sweats, hot flashes  Cardiovascular:  denies chest pain or palpitations  Respiratory:  denies shortness of breath  Gastrointestinal:  denies heartburn, abdominal pain, diarrhea or constipation  Genitourinary:  denies dysuria, incontinence, abnormal vaginal discharge, vaginal itching   Musculoskeletal:  denies back pain.  Skin/Breast:  Denies any breast pain, lumps, or discharge.   Neurological:  denies headaches, extremity  weakness or numbness.  Psychiatric: denies depression or anxiety.  Endocrine:   denies excessive thirst or urination.  Heme/Lymph:  denies history of anemia, easy bruising or bleeding.      PHYSICAL EXAM:     Vitals:    06/26/25 1522   BP: 126/85   Pulse: 80   Weight: 155 lb (70.3 kg)     Body mass index is 27.46 kg/m².     Patient offered chaperone, patient declined    Constitutional: well developed, well nourished    Psychiatric:  Oriented to time, place, person and situation. Appropriate mood and affect    Pelvic Exam:  External Genitalia: normal appearance, hair distribution, and no lesions  Urethral Meatus:  normal in size, location, without lesions and prolapse  Bladder:  No fullness, masses or tenderness  Vagina:  Normal appearance without lesions, no abnormal discharge  Cervix:  Normal without tenderness on motion  Uterus: normal in size, contour, position, mobility, without tenderness  Adnexa: normal without masses or tenderness  Perineum: normal  Anus: no hemorroids   Lymph node: no inguinal lymph nodes      Assessment & Plan:    ICD-10-CM    1. Vaginal irritation  N89.8         Assessment & Plan  Normal exam  Symptoms resolved post treatment with OT monistat  Monitor and call if symptoms return        JOSELIN Chaudhari        This note was prepared using Dragon Medical voice recognition dictation software. As a result errors may occur. When identified these errors have been corrected. While every attempt is made to correct errors during dictation discrepancies may still exist.

## 2025-08-11 DIAGNOSIS — E11.65 UNCONTROLLED TYPE 2 DIABETES MELLITUS WITH HYPERGLYCEMIA (HCC): Primary | ICD-10-CM

## 2025-08-12 RX ORDER — GLIMEPIRIDE 4 MG/1
4 TABLET ORAL
Qty: 90 TABLET | Refills: 1 | Status: SHIPPED | OUTPATIENT
Start: 2025-08-12

## 2025-08-20 ENCOUNTER — PATIENT MESSAGE (OUTPATIENT)
Dept: ENDOCRINOLOGY CLINIC | Facility: CLINIC | Age: 40
End: 2025-08-20

## 2025-08-20 RX ORDER — FLUCONAZOLE 150 MG/1
150 TABLET ORAL ONCE
Qty: 1 TABLET | Refills: 0 | Status: SHIPPED | OUTPATIENT
Start: 2025-08-20 | End: 2025-08-20

## (undated) NOTE — ED AVS SNAPSHOT
Josecinthya Bruner   MRN: [id number]    Department:  [facility] Emergency Department   Date of Visit:  1/20/2020           Disclosure     Insurance plans vary and the physician(s) referred by the ER may not be covered by your plan.  Please contact CARE PHYSICIAN AT ONCE OR RETURN IMMEDIATELY TO THE EMERGENCY DEPARTMENT. If you have been prescribed any medication(s), please fill your prescription right away and begin taking the medication(s) as directed.   If you believe that any of the medications

## (undated) NOTE — LETTER
Date & Time: 8/7/2023, 2:13 PM  Patient: Jose Fair  Encounter Provider(s):    Maryrose Bosworth, MD       To Whom It May Concern:    Jose Fair was seen and treated in our department on 8/7/2023. She is excused from work for 5 days.     If you have any questions or concerns, please do not hesitate to call.        _____________________________  Physician/APC Signature

## (undated) NOTE — MR AVS SNAPSHOT
[address] at [address] [place] [address].  [address]  [place] [address]  [phone]               Thank you for choosing us for your health care visit with Ishmael Ortiz MD.  We are glad to serve you and happy to prov - MetFORMIN HCl 500 MG Tabs  - Oxybutynin Chloride ER 10 MG Tb24      You can get these medications from any pharmacy     Bring a paper prescription for each of these medications    - Phentermine HCl 37.5 MG Tabs            MyChart     Sign up for Trakt

## (undated) NOTE — LETTER
AUTHORIZATION FOR SURGICAL OPERATION OR OTHER PROCEDURE    1. I hereby authorize Dr. Brook Warner, and [place] [facility] staff assigned to my case to perform the following operation and/or procedure at the [facility], [facility]:    _______________________________________________________________________________________________    COLPOSCOPY   _______________________________________________________________________________________________    2. My physician has explained the nature and purpose of the operation or other procedure, possible alternative methods of treatment, the risks involved, and the possibility of complication to me. I acknowledge that no guarantee has been made as to the result that may be obtained. 3.  I recognize that, during the course of this operation, or other procedure, unforseen conditions may necessitate additional or different procedure than those listed above. I, therefore, further authorize and request that the above named physician, his/her physician assistants or designees perform such procedures as are, in his/her professional opinion, necessary and desirable. 4.  Any tissue or organs removed in the operation or other procedure may be disposed of by and at the discretion of the [facility], [facility] and [facility]. 5.  I understand that in the event of a medical emergency, I will be transported by local paramedics to [facility] or other [facility] emergency department. 6.  I certify that I have read and fully understand the above consent to operation and/or other procedure. 7.  I acknowledge that my physician has explained sedation/analgesia administration to me including the risks and benefits. I consent to the administration of sedation/analgesia as may be necessary or desirable in the judgement of my physician. Witness signature: ___________________________________________________ Date:  ______/______/_____                    Time:  ________ A. M.  P.M. Patient Name:  ______________________________________________________  (please print)      Patient signature:  ___________________________________________________             Relationship to Patient:           []  Parent    Responsible person                          []  Spouse  In case of minor or                    [] Other  _____________   Incompetent name:  __________________________________________________                               (please print)      _____________      Responsible person  In case of minor or  Incompetent signature:  _______________________________________________    Statement of Physician  My signature below affirms that prior to the time of the procedure, I have explained to the patient and/or his/her guardian, the risks and benefits involved in the proposed treatment and any reasonable alternative to the proposed treatment. I have also explained the risks and benefits involved in the refusal of the proposed treatment and have answered the patient's questions.                         Date:  ______/______/_______  Provider                      Signature:  __________________________________________________________       Time:  ___________ A.M    P.M.

## (undated) NOTE — LETTER
AUTHORIZATION FOR SURGICAL OPERATION OR OTHER PROCEDURE    1. I hereby authorize Dr. KRISHNA, and [facility] staff assigned to my case to perform the following operation and/or procedure at the [facility] site:    _______________________________________________________________________________________________    COLPOSCOPY   _______________________________________________________________________________________________    2.  My physician has explained the nature and purpose of the operation or other procedure, possible alternative methods of treatment, the risks involved, and the possibility of complication to me.  I acknowledge that no guarantee has been made as to the result that may be obtained.  3.  I recognize that, during the course of this operation, or other procedure, unforseen conditions may necessitate additional or different procedure than those listed above.  I, therefore, further authorize and request that the above named physician, his/her physician assistants or designees perform such procedures as are, in his/her professional opinion, necessary and desirable.  4.  Any tissue or organs removed in the operation or other procedure may be disposed of by and at the discretion of the [facility] and [facility].  5.  I understand that in the event of a medical emergency, I will be transported by local paramedics to [facility] or other hospital emergency department.  6.  I certify that I have read and fully understand the above consent to operation and/or other procedure.    7.  I acknowledge that my physician has explained sedation/analgesia administration to me including the risks and benefits.  I consent to the administration of sedation/analgesia as may be necessary or desirable in the judgement of my physician.    Witness signature: ___________________________________________________ Date:  ______/______/_____                    Time:   ________ A.M.  P.M.       Patient Name:  ______________________________________________________  (please print)      Patient signature:  ___________________________________________________             Relationship to Patient:           []  Parent    Responsible person                          []  Spouse  In case of minor or                    [] Other  _____________   Incompetent name:  __________________________________________________                               (please print)      _____________      Responsible person  In case of minor or  Incompetent signature:  _______________________________________________    Statement of Physician  My signature below affirms that prior to the time of the procedure, I have explained to the patient and/or his/her guardian, the risks and benefits involved in the proposed treatment and any reasonable alternative to the proposed treatment.  I have also explained the risks and benefits involved in the refusal of the proposed treatment and have answered the patient's questions.                        Date:  ______/______/_______  Provider                      Signature:  __________________________________________________________       Time:  ___________ A.M    P.M.

## (undated) NOTE — LETTER
Dr. Karen Gallegos MD     [address]     August 14, 2019      Michael Reene  [address] [facility]      Dear Ms. Rob Arias

## (undated) NOTE — ED AVS SNAPSHOT
Timothy Cogan   MRN: [id number]    Department:  [facility] Emergency Department   Date of Visit:  3/11/2018           Disclosure     Insurance plans vary and the physician(s) referred by the ER may not be covered by your plan.  Please contact CARE PHYSICIAN AT ONCE OR RETURN IMMEDIATELY TO THE EMERGENCY DEPARTMENT. If you have been prescribed any medication(s), please fill your prescription right away and begin taking the medication(s) as directed.   If you believe that any of the medications

## (undated) NOTE — LETTER
AUTHORIZATION FOR SURGICAL OPERATION OR OTHER PROCEDURE    1. I hereby authorize Dr. KRISHNA, and [facility] staff assigned to my case to perform the following operation and/or procedure at the [facility] site:    _______________________________________________________________________________________________    LEEP  _______________________________________________________________________________________________    2.  My physician has explained the nature and purpose of the operation or other procedure, possible alternative methods of treatment, the risks involved, and the possibility of complication to me.  I acknowledge that no guarantee has been made as to the result that may be obtained.  3.  I recognize that, during the course of this operation, or other procedure, unforseen conditions may necessitate additional or different procedure than those listed above.  I, therefore, further authorize and request that the above named physician, his/her physician assistants or designees perform such procedures as are, in his/her professional opinion, necessary and desirable.  4.  Any tissue or organs removed in the operation or other procedure may be disposed of by and at the discretion of the [facility] and [facility].  5.  I understand that in the event of a medical emergency, I will be transported by local paramedics to [facility] or other hospital emergency department.  6.  I certify that I have read and fully understand the above consent to operation and/or other procedure.    7.  I acknowledge that my physician has explained sedation/analgesia administration to me including the risks and benefits.  I consent to the administration of sedation/analgesia as may be necessary or desirable in the judgement of my physician.    Witness signature: ___________________________________________________ Date:  ______/______/_____                    Time:  ________  A.M.  P.M.       Patient Name:  ______________________________________________________  (please print)      Patient signature:  ___________________________________________________             Relationship to Patient:           []  Parent    Responsible person                          []  Spouse  In case of minor or                    [] Other  _____________   Incompetent name:  __________________________________________________                               (please print)      _____________      Responsible person  In case of minor or  Incompetent signature:  _______________________________________________    Statement of Physician  My signature below affirms that prior to the time of the procedure, I have explained to the patient and/or his/her guardian, the risks and benefits involved in the proposed treatment and any reasonable alternative to the proposed treatment.  I have also explained the risks and benefits involved in the refusal of the proposed treatment and have answered the patient's questions.                        Date:  ______/______/_______  Provider                      Signature:  __________________________________________________________       Time:  ___________ A.M    P.M.

## (undated) NOTE — LETTER
Date: 3/4/2025    Patient Name: Collette Venegas          To Whom it may concern:    This letter has been written at the patient's request. The above patient was seen at [facility] for treatment of a medical condition.    This patient should be excused from attending work from 2/28 through 3/5/25.    The patient may return to work/school on 3/6/25 with the following limitations none.        Sincerely,    Mikhail Zamorano MD

## (undated) NOTE — MR AVS SNAPSHOT
[address] at [facility]  [address]  [facility], [address] [facility]                Thank you for choosing us for your health care visit with Patience Lizarraga MD.  We are glad to serve you and happy to jamir your appointment immediately. However, if you are unsure about the requirements for authorization, please wait 5-7 days and then contact your physician's office.  At that time, you will be provided with any authorization numbers or be assured that none are [address], [phone], [address], [address] [address]     Phone:  [phone]    - MetFORMIN HCl 500 MG Tabs      You can get these medications from any pharmacy     Bring a paper prescript

## (undated) NOTE — ED AVS SNAPSHOT
Chang Brown   MRN: [id number]    Department:  [facility] Emergency Department   Date of Visit:  3/16/2019           Disclosure     Insurance plans vary and the physician(s) referred by the ER may not be covered by your plan.  Please contact CARE PHYSICIAN AT ONCE OR RETURN IMMEDIATELY TO THE EMERGENCY DEPARTMENT. If you have been prescribed any medication(s), please fill your prescription right away and begin taking the medication(s) as directed.   If you believe that any of the medications

## (undated) NOTE — LETTER
AUTHORIZATION FOR SURGICAL OPERATION OR OTHER PROCEDURE    1. I hereby authorize Dr. Amy Santana, and [facility] staff assigned to my case to perform the following operation and/or procedure at the [facility] site:    _______________________________________________________________________________________________    Colposcopy  _______________________________________________________________________________________________    2.  My physician has explained the nature and purpose of the operation or other procedure, possible alternative methods of treatment, the risks involved, and the possibility of complication to me.  I acknowledge that no guarantee has been made as to the result that may be obtained.  3.  I recognize that, during the course of this operation, or other procedure, unforseen conditions may necessitate additional or different procedure than those listed above.  I, therefore, further authorize and request that the above named physician, his/her physician assistants or designees perform such procedures as are, in his/her professional opinion, necessary and desirable.  4.  Any tissue or organs removed in the operation or other procedure may be disposed of by and at the discretion of the [facility] and [facility].  5.  I understand that in the event of a medical emergency, I will be transported by local paramedics to [facility] or other hospital emergency department.  6.  I certify that I have read and fully understand the above consent to operation and/or other procedure.    7.  I acknowledge that my physician has explained sedation/analgesia administration to me including the risks and benefits.  I consent to the administration of sedation/analgesia as may be necessary or desirable in the judgement of my physician.    Witness signature: ___________________________________________________ Date:  ______/______/_____                     Time:  ________ A.M.  P.M.       Patient Name:  ______________________________________________________  (please print)      Patient signature:  ___________________________________________________             Relationship to Patient:           []  Parent    Responsible person                          []  Spouse  In case of minor or                    [] Other  _____________   Incompetent name:  __________________________________________________                               (please print)      _____________      Responsible person  In case of minor or  Incompetent signature:  _______________________________________________    Statement of Physician  My signature below affirms that prior to the time of the procedure, I have explained to the patient and/or his/her guardian, the risks and benefits involved in the proposed treatment and any reasonable alternative to the proposed treatment.  I have also explained the risks and benefits involved in the refusal of the proposed treatment and have answered the patient's questions.                        Date:  ______/______/_______  Provider                      Signature:  __________________________________________________________       Time:  ___________ A.M    P.M.

## (undated) NOTE — MR AVS SNAPSHOT
After Visit Summary   1/11/2024    Collette Venegas   MRN: [id number]           Visit Information     Date & Time  1/11/2024  3:00 PM Provider  Theodora Velasco APRN [facility] - OB/GYN Dept. Phone  [phone]      Your Vitals Were  Most recent update: 1/11/2024  3:21 PM    BP   120/78    Pulse   80    Wt   151 lb    LMP   12/18/2023 (Exact Date)    BMI   27.62 kg/m²         Allergies as of 1/11/2024  Review status set to Review Complete on 1/11/2024   No Known Allergies     Your Current Medications        Dosage    dapagliflozin (FARXIGA) 10 MG Oral Tab Take 1 tablet (10 mg total) by mouth daily.    Dulaglutide (TRULICITY) 3 MG/0.5ML Subcutaneous Solution Pen-injector Inject 3 mg into the skin once a week.    Dulaglutide (TRULICITY) 4.5 MG/0.5ML Subcutaneous Solution Pen-injector Inject 4.5 mg into the skin once a week.    glimepiride 4 MG Oral Tab Take 1 tablet (4 mg total) by mouth every morning before breakfast.    Glucose Blood (ONETOUCH VERIO) In Vitro Strip Check 4 times per day    Lancets (ONETOUCH DELICA PLUS [id number]) Does not apply Misc 1 Device in the morning, at noon, in the evening, and at bedtime.    Insulin Pen Needle (BD PEN NEEDLE BRIDGET U/F) 32G X 4 MM Does not apply Misc Inject 4 times per day    Continuous Blood Gluc Sensor (DEXCOM G7 SENSOR) Does not apply Misc 1 Device Every 10 days.    Blood Glucose Monitoring Suppl (ONETOUCH VERIO) w/Device Does not apply Kit 1 Device daily.    Lancets (ONETOUCH DELICA PLUS [id number]) Does not apply Misc 1 Device daily.      Diagnoses for This Visit    Well woman exam with routine gynecological exam   [721654]  -  Primary  Screening for cervical cancer   [859802]    Dyspareunia in female   [0480733]    Chronic RLQ pain   [663852]             We Ordered the Following     Normal Orders This Visit    Hpv Dna  High Risk , Thin Prep Collect [FTF7144 CUSTOM]     THINPREP PAP SMEAR ONLY [[id number]  CUSTOM]     ThinPrep PAP Smear [[id number] CUSTOM]     Future Labs/Procedures Expected by Expires    Hpv Dna  High Risk , Thin Prep Collect [[id number] CUSTOM]  1/11/2024 1/11/2025    ThinPrep PAP Smear [[id number] CUSTOM]  1/11/2024 1/11/2025    US PELVIS W EV (CPT=76856/97230) [COMBO CPT(R)]  1/11/2024 (Approximate) 1/10/2025      Future Appointments        Provider Department    4/12/2024 11:00 AM Rebecca Swan [facility]      Imaging Scheduling Instructions     Around January 11, 2024   Imaging:   US PELVIS W EV (CPT=76856/97924)    Instructions: To schedule an appointment for your radiology test please call [facility] at [phone].                    Did you know that [facility] [place] now offer Video Visits through [employer] for adult patients for a variety of conditions such as allergies, back pain and cold symptoms? Skip the drive and waiting room and online chat with a doctor face-to-face using your web-cam enabled computer or mobile device wherever you are. Video Visits cost $50 and can be paid hassle-free using a credit, debit, or health savings card.  Not active on [employer]? Ask us how to get signed up today!          If you receive a survey from [employer], please take a few minutes to complete it and provide feedback. We strive to deliver the best patient experience and are looking for ways to make improvements. Your feedback will help us do so. For more information on [employer], please visit [email or web address]           No text in SmartText           No text in SmartText

## (undated) NOTE — LETTER
Date & Time: 8/3/2023, 2:17 PM  Patient: Haleigh Bearden  Encounter Provider(s):    Jacques John MD       To Whom It May Concern:    Haleigh Bearden was seen and treated in our department on 8/3/2023. She is excused from work for 4 days.     If you have any questions or concerns, please do not hesitate to call.        _____________________________  Physician/APC Signature